# Patient Record
Sex: MALE | Race: WHITE | NOT HISPANIC OR LATINO | Employment: FULL TIME | URBAN - METROPOLITAN AREA
[De-identification: names, ages, dates, MRNs, and addresses within clinical notes are randomized per-mention and may not be internally consistent; named-entity substitution may affect disease eponyms.]

---

## 2018-03-14 ENCOUNTER — OFFICE VISIT (OUTPATIENT)
Dept: ORTHOPEDIC SURGERY | Facility: CLINIC | Age: 68
End: 2018-03-14

## 2018-03-14 DIAGNOSIS — M17.0 PRIMARY OSTEOARTHRITIS OF KNEES, BILATERAL: Primary | ICD-10-CM

## 2018-03-14 PROCEDURE — 20610 DRAIN/INJ JOINT/BURSA W/O US: CPT | Performed by: NURSE PRACTITIONER

## 2018-03-14 PROCEDURE — 73562 X-RAY EXAM OF KNEE 3: CPT | Performed by: NURSE PRACTITIONER

## 2018-03-14 PROCEDURE — 99212 OFFICE O/P EST SF 10 MIN: CPT | Performed by: NURSE PRACTITIONER

## 2018-03-14 RX ORDER — LORATADINE 10 MG/1
10 CAPSULE, LIQUID FILLED ORAL
COMMUNITY
End: 2019-06-18

## 2018-03-14 RX ORDER — VALSARTAN AND HYDROCHLOROTHIAZIDE 320; 25 MG/1; MG/1
1 TABLET, FILM COATED ORAL DAILY
COMMUNITY
Start: 2018-02-19 | End: 2021-06-28 | Stop reason: HOSPADM

## 2018-03-14 RX ORDER — KRILL/OM-3/DHA/EPA/PHOSPHO/AST 500MG-86MG
500 CAPSULE ORAL
COMMUNITY
End: 2019-06-18

## 2018-03-14 RX ORDER — DOXAZOSIN MESYLATE 4 MG/1
TABLET ORAL 2 TIMES DAILY
Status: ON HOLD | COMMUNITY
Start: 2018-03-07 | End: 2018-03-16

## 2018-03-14 RX ORDER — MAGNESIUM OXIDE/MAG AA CHELATE 300 MG
160 CAPSULE ORAL
COMMUNITY
End: 2019-06-18

## 2018-03-14 RX ORDER — PHENOL 1.4 %
600 AEROSOL, SPRAY (ML) MUCOUS MEMBRANE DAILY
COMMUNITY
End: 2019-06-18

## 2018-03-14 RX ADMIN — TRIAMCINOLONE ACETONIDE 80 MG: 40 INJECTION, SUSPENSION INTRA-ARTICULAR; INTRAMUSCULAR at 15:27

## 2018-03-14 RX ADMIN — LIDOCAINE HYDROCHLORIDE 8 ML: 10 INJECTION, SOLUTION EPIDURAL; INFILTRATION; INTRACAUDAL; PERINEURAL at 15:27

## 2018-03-14 NOTE — PROGRESS NOTES
Subjective:     Patient ID: Jefry Sabillon is a 67 y.o. male.    Chief Complaint: primary osteoarthritis bilateral knees    History of Present Illness    Mr. Sabillon presents with his spouse for follow-up bilateral knee pain. Reports pain present over the last several months however wife states that pain has been present for the last year. He was seen by this APRN last in August 2016 for last viscosupplement injections. Reports over the last several months pain at bilateral knees has returned. Pain present over bilateral medial and lateral joint lines and patella. Increased pain with ascending and descending steps, changing positions such as from sitting to standing and attempting to walk. Denies that he feels as if the knees are unstable at this time. Denies presence of numbness and tingling radiating down bilateral lower extremities. Rates pain at 8 out of 10, aching in nature. Denies that he is yet ready to proceed with total joint replacement surgery but would like to discuss pain relief measures at this time. Denies all other concerns present at this time.      Social History     Occupational History   • Not on file.     Social History Main Topics   • Smoking status: Never Smoker   • Smokeless tobacco: Never Used   • Alcohol use Yes   • Drug use: No   • Sexual activity: Defer      Past Medical History:   Diagnosis Date   • Arthritis    • Asthma    • Cardiac disease    • Coronary artery disease    • Heart disease    • Hypertension    • Knee sprain    • Mini stroke    • EVE (obstructive sleep apnea)    • Rotator cuff syndrome      Past Surgical History:   Procedure Laterality Date   • HERNIA REPAIR  2007   • KNEE SURGERY  1979       Family History   Problem Relation Age of Onset   • Hypertension Father    • Heart disease Father          Review of Systems   Constitutional: Negative for chills, diaphoresis, fever and unexpected weight change.   HENT: Negative for hearing loss, nosebleeds, sore throat and tinnitus.     Eyes: Negative for pain and visual disturbance.   Respiratory: Negative for cough, shortness of breath and wheezing.    Cardiovascular: Negative for chest pain and palpitations.   Gastrointestinal: Negative for abdominal pain, diarrhea, nausea and vomiting.   Endocrine: Negative for cold intolerance, heat intolerance and polydipsia.   Genitourinary: Negative for difficulty urinating, dysuria and hematuria.   Musculoskeletal: Positive for arthralgias and joint swelling. Negative for myalgias.   Skin: Negative for rash and wound.   Allergic/Immunologic: Negative for environmental allergies.   Neurological: Negative for dizziness, syncope and numbness.   Hematological: Does not bruise/bleed easily.   Psychiatric/Behavioral: Negative for dysphoric mood and sleep disturbance. The patient is not nervous/anxious.    All other systems reviewed and are negative.          Objective:  Physical Exam    General: No acute distress.  Eyes: conjunctiva clear; pupils equally round and reactive  ENT: external ears and nose atraumatic; oropharynx clear  CV: no peripheral edema  Resp: normal respiratory effort  Skin: no rashes or wounds; normal turgor  Psych: mood and affect appropriate; recent and remote memory intact    There were no vitals filed for this visit.  There were no vitals filed for this visit.  There is no height or weight on file to calculate BMI.     Right Knee Exam     Tenderness   The patient is experiencing tenderness in the lateral joint line, medial joint line and patella.    Range of Motion   Extension: 0   Flexion: 120     Tests   Keith:  Medial - negative Lateral - negative  Lachman:  Anterior - 1+    Posterior - negative  Drawer:       Anterior - negative    Posterior - negative  Varus: negative  Valgus: negative    Other   Erythema: absent  Scars: absent  Sensation: normal  Pulse: present  Swelling: mild  Other tests: no effusion present    Comments:  Crepitus throughout arc of motion        Left Knee  Exam     Tenderness   The patient is experiencing tenderness in the lateral joint line, medial joint line and patella.    Range of Motion   Extension: 0   Flexion: 120     Tests   Keith:  Medial - negative Lateral - negative  Lachman:  Anterior - 1+    Posterior - negative  Drawer:       Anterior - negative     Posterior - negative  Varus: negative  Valgus: negative    Other   Erythema: absent  Scars: absent  Sensation: normal  Pulse: present  Swelling: mild  Effusion: no effusion present    Comments:  Crepitus throughout arc of motion              Imaging:  Bilateral Knee X-Ray  Indication: Pain    AP, Lateral, and Rodessa views    Findings:  No fracture  No bony lesion  Normal soft tissues  Tricompartmental osteoarthritis bilateral knees  Prior studies were available for comparison.    Assessment:       1. Primary osteoarthritis of knees, bilateral          Plan:  1. Discussed plan of care with patient and his spouse. Wishes to proceed with corticosteroid injections at bilateral knees. Will plan to see him back in one month for follow-up/reassess at that time. Discussed if not improving, will plan to submit for viscosupplement injections at that time for bilateral knees.   2. Encouraged for him to continue with his daily strengthening activities. Patient verbalized understanding of all information and agrees with plan of care. Denies all other concerns present at this time.     Orders:  Orders Placed This Encounter   Procedures   • Large Joint Arthrocentesis   • XR Knee 3 View Bilateral       I ordered and reviewed the IRENE today.     Dictated utilizing Dragon dictation     Large Joint Arthrocentesis  Date/Time: 3/14/2018 3:27 PM  Consent given by: patient  Site marked: site marked  Timeout: Immediately prior to procedure a time out was called to verify the correct patient, procedure, equipment, support staff and site/side marked as required   Supporting Documentation  Indications: pain   Procedure  Details  Location: knee - Knee joint: bilateral.  Preparation: Patient was prepped and draped in the usual sterile fashion  Needle size: 22 G  Approach: anterolateral  Medications administered: 8 mL lidocaine PF 1% 1 %; 80 mg triamcinolone acetonide 40 MG/ML  Patient tolerance: patient tolerated the procedure well with no immediate complications

## 2018-03-15 ENCOUNTER — APPOINTMENT (OUTPATIENT)
Dept: GENERAL RADIOLOGY | Facility: HOSPITAL | Age: 68
End: 2018-03-15

## 2018-03-15 ENCOUNTER — HOSPITAL ENCOUNTER (OUTPATIENT)
Facility: HOSPITAL | Age: 68
Setting detail: OBSERVATION
Discharge: HOME | End: 2018-03-16
Attending: EMERGENCY MEDICINE | Admitting: FAMILY MEDICINE

## 2018-03-15 DIAGNOSIS — R73.9 HYPERGLYCEMIA: ICD-10-CM

## 2018-03-15 DIAGNOSIS — R09.89 PULMONARY VASCULAR CONGESTION: ICD-10-CM

## 2018-03-15 DIAGNOSIS — R06.00 ACUTE DYSPNEA: Primary | ICD-10-CM

## 2018-03-15 DIAGNOSIS — R07.9 INTERMITTENT CHEST PAIN: ICD-10-CM

## 2018-03-15 DIAGNOSIS — R01.1 SYSTOLIC MURMUR: ICD-10-CM

## 2018-03-15 LAB
ALBUMIN SERPL-MCNC: 4.3 G/DL (ref 3.5–5.2)
ALBUMIN/GLOB SERPL: 1.7 G/DL
ALP SERPL-CCNC: 87 U/L (ref 40–129)
ALT SERPL W P-5'-P-CCNC: 61 U/L (ref 5–41)
ANION GAP SERPL CALCULATED.3IONS-SCNC: 14.7 MMOL/L
ANION GAP SERPL CALCULATED.3IONS-SCNC: 16.1 MMOL/L
AST SERPL-CCNC: 27 U/L (ref 5–40)
BASOPHILS # BLD AUTO: 0.01 10*3/MM3 (ref 0–0.2)
BASOPHILS NFR BLD AUTO: 0.1 % (ref 0–2)
BILIRUB SERPL-MCNC: 0.8 MG/DL (ref 0.2–1.2)
BUN BLD-MCNC: 18 MG/DL (ref 8–23)
BUN BLD-MCNC: 20 MG/DL (ref 8–23)
BUN/CREAT SERPL: 17 (ref 7–25)
BUN/CREAT SERPL: 17.4 (ref 7–25)
CALCIUM SPEC-SCNC: 10.2 MG/DL (ref 8.8–10.5)
CALCIUM SPEC-SCNC: 10.5 MG/DL (ref 8.8–10.5)
CHLORIDE SERPL-SCNC: 100 MMOL/L (ref 98–107)
CHLORIDE SERPL-SCNC: 99 MMOL/L (ref 98–107)
CO2 SERPL-SCNC: 21.3 MMOL/L (ref 22–29)
CO2 SERPL-SCNC: 22.9 MMOL/L (ref 22–29)
CREAT BLD-MCNC: 1.06 MG/DL (ref 0.76–1.27)
CREAT BLD-MCNC: 1.15 MG/DL (ref 0.76–1.27)
D DIMER PPP FEU-MCNC: 0.35 MCGFEU/ML (ref 0–0.46)
DEPRECATED RDW RBC AUTO: 47.5 FL (ref 37–54)
DEPRECATED RDW RBC AUTO: 48.3 FL (ref 37–54)
EOSINOPHIL # BLD AUTO: 0 10*3/MM3 (ref 0.1–0.3)
EOSINOPHIL NFR BLD AUTO: 0 % (ref 0–4)
ERYTHROCYTE [DISTWIDTH] IN BLOOD BY AUTOMATED COUNT: 14.9 % (ref 11.5–14.5)
ERYTHROCYTE [DISTWIDTH] IN BLOOD BY AUTOMATED COUNT: 15.2 % (ref 11.5–14.5)
GFR SERPL CREATININE-BSD FRML MDRD: 64 ML/MIN/1.73
GFR SERPL CREATININE-BSD FRML MDRD: 70 ML/MIN/1.73
GLOBULIN UR ELPH-MCNC: 2.5 GM/DL
GLUCOSE BLD-MCNC: 251 MG/DL (ref 65–99)
GLUCOSE BLD-MCNC: 281 MG/DL (ref 65–99)
GLUCOSE BLDC GLUCOMTR-MCNC: 249 MG/DL (ref 70–130)
HCT VFR BLD AUTO: 38.9 % (ref 42–52)
HCT VFR BLD AUTO: 39.3 % (ref 42–52)
HGB BLD-MCNC: 13.6 G/DL (ref 14–18)
HGB BLD-MCNC: 13.6 G/DL (ref 14–18)
IMM GRANULOCYTES # BLD: 0.06 10*3/MM3 (ref 0–0.03)
IMM GRANULOCYTES NFR BLD: 0.4 % (ref 0–0.5)
LYMPHOCYTES # BLD AUTO: 0.88 10*3/MM3 (ref 0.6–4.8)
LYMPHOCYTES NFR BLD AUTO: 5.8 % (ref 20–45)
MCH RBC QN AUTO: 30.3 PG (ref 27–31)
MCH RBC QN AUTO: 30.4 PG (ref 27–31)
MCHC RBC AUTO-ENTMCNC: 34.6 G/DL (ref 31–37)
MCHC RBC AUTO-ENTMCNC: 35 G/DL (ref 31–37)
MCV RBC AUTO: 87 FL (ref 80–94)
MCV RBC AUTO: 87.5 FL (ref 80–94)
MONOCYTES # BLD AUTO: 0.43 10*3/MM3 (ref 0–1)
MONOCYTES NFR BLD AUTO: 2.8 % (ref 3–8)
NEUTROPHILS # BLD AUTO: 13.91 10*3/MM3 (ref 1.5–8.3)
NEUTROPHILS NFR BLD AUTO: 90.9 % (ref 45–70)
NRBC BLD MANUAL-RTO: 0 /100 WBC (ref 0–0)
NT-PROBNP SERPL-MCNC: 259.5 PG/ML (ref 0–900)
PLATELET # BLD AUTO: 214 10*3/MM3 (ref 140–500)
PLATELET # BLD AUTO: 225 10*3/MM3 (ref 140–500)
PMV BLD AUTO: 10.3 FL (ref 7.4–10.4)
PMV BLD AUTO: 10.5 FL (ref 7.4–10.4)
POTASSIUM BLD-SCNC: 4.2 MMOL/L (ref 3.5–5.2)
POTASSIUM BLD-SCNC: 4.4 MMOL/L (ref 3.5–5.2)
PROT SERPL-MCNC: 6.8 G/DL (ref 6–8.5)
RBC # BLD AUTO: 4.47 10*6/MM3 (ref 4.7–6.1)
RBC # BLD AUTO: 4.49 10*6/MM3 (ref 4.7–6.1)
SODIUM BLD-SCNC: 136 MMOL/L (ref 136–145)
SODIUM BLD-SCNC: 138 MMOL/L (ref 136–145)
TROPONIN T SERPL-MCNC: <0.01 NG/ML (ref 0–0.03)
WBC NRBC COR # BLD: 15.29 10*3/MM3 (ref 4.8–10.8)
WBC NRBC COR # BLD: 15.6 10*3/MM3 (ref 4.8–10.8)

## 2018-03-15 PROCEDURE — 83880 ASSAY OF NATRIURETIC PEPTIDE: CPT | Performed by: EMERGENCY MEDICINE

## 2018-03-15 PROCEDURE — G0378 HOSPITAL OBSERVATION PER HR: HCPCS

## 2018-03-15 PROCEDURE — 93010 ELECTROCARDIOGRAM REPORT: CPT | Performed by: INTERNAL MEDICINE

## 2018-03-15 PROCEDURE — 25010000002 ENOXAPARIN PER 10 MG: Performed by: FAMILY MEDICINE

## 2018-03-15 PROCEDURE — 93005 ELECTROCARDIOGRAM TRACING: CPT | Performed by: EMERGENCY MEDICINE

## 2018-03-15 PROCEDURE — 71046 X-RAY EXAM CHEST 2 VIEWS: CPT

## 2018-03-15 PROCEDURE — 85025 COMPLETE CBC W/AUTO DIFF WBC: CPT | Performed by: EMERGENCY MEDICINE

## 2018-03-15 PROCEDURE — 63710000001 INSULIN ASPART PER 5 UNITS: Performed by: FAMILY MEDICINE

## 2018-03-15 PROCEDURE — 80048 BASIC METABOLIC PNL TOTAL CA: CPT | Performed by: FAMILY MEDICINE

## 2018-03-15 PROCEDURE — 82962 GLUCOSE BLOOD TEST: CPT

## 2018-03-15 PROCEDURE — 80053 COMPREHEN METABOLIC PANEL: CPT | Performed by: EMERGENCY MEDICINE

## 2018-03-15 PROCEDURE — 85379 FIBRIN DEGRADATION QUANT: CPT | Performed by: EMERGENCY MEDICINE

## 2018-03-15 PROCEDURE — 85027 COMPLETE CBC AUTOMATED: CPT | Performed by: FAMILY MEDICINE

## 2018-03-15 PROCEDURE — 99284 EMERGENCY DEPT VISIT MOD MDM: CPT | Performed by: EMERGENCY MEDICINE

## 2018-03-15 PROCEDURE — 96372 THER/PROPH/DIAG INJ SC/IM: CPT

## 2018-03-15 PROCEDURE — 99283 EMERGENCY DEPT VISIT LOW MDM: CPT

## 2018-03-15 PROCEDURE — 84484 ASSAY OF TROPONIN QUANT: CPT | Performed by: EMERGENCY MEDICINE

## 2018-03-15 RX ORDER — ASPIRIN 81 MG/1
81 TABLET, CHEWABLE ORAL DAILY
Status: DISCONTINUED | OUTPATIENT
Start: 2018-03-15 | End: 2018-03-16 | Stop reason: HOSPADM

## 2018-03-15 RX ORDER — DOXAZOSIN 2 MG/1
4 TABLET ORAL 2 TIMES DAILY
Status: DISCONTINUED | OUTPATIENT
Start: 2018-03-15 | End: 2018-03-16

## 2018-03-15 RX ORDER — NICOTINE POLACRILEX 4 MG
15 LOZENGE BUCCAL
Status: DISCONTINUED | OUTPATIENT
Start: 2018-03-15 | End: 2018-03-16 | Stop reason: HOSPADM

## 2018-03-15 RX ORDER — BISACODYL 5 MG/1
5 TABLET, DELAYED RELEASE ORAL DAILY PRN
Status: DISCONTINUED | OUTPATIENT
Start: 2018-03-15 | End: 2018-03-16 | Stop reason: HOSPADM

## 2018-03-15 RX ORDER — SODIUM CHLORIDE 0.9 % (FLUSH) 0.9 %
10 SYRINGE (ML) INJECTION AS NEEDED
Status: DISCONTINUED | OUTPATIENT
Start: 2018-03-15 | End: 2018-03-16 | Stop reason: HOSPADM

## 2018-03-15 RX ORDER — ACETAMINOPHEN 325 MG/1
650 TABLET ORAL EVERY 4 HOURS PRN
Status: DISCONTINUED | OUTPATIENT
Start: 2018-03-15 | End: 2018-03-16 | Stop reason: HOSPADM

## 2018-03-15 RX ORDER — SODIUM CHLORIDE 0.9 % (FLUSH) 0.9 %
1-10 SYRINGE (ML) INJECTION AS NEEDED
Status: DISCONTINUED | OUTPATIENT
Start: 2018-03-15 | End: 2018-03-16 | Stop reason: HOSPADM

## 2018-03-15 RX ORDER — DEXTROSE MONOHYDRATE 25 G/50ML
25 INJECTION, SOLUTION INTRAVENOUS
Status: DISCONTINUED | OUTPATIENT
Start: 2018-03-15 | End: 2018-03-16 | Stop reason: HOSPADM

## 2018-03-15 RX ORDER — FUROSEMIDE 20 MG/1
20 TABLET ORAL DAILY
Status: DISCONTINUED | OUTPATIENT
Start: 2018-03-15 | End: 2018-03-16

## 2018-03-15 RX ORDER — SODIUM CHLORIDE 9 MG/ML
40 INJECTION, SOLUTION INTRAVENOUS AS NEEDED
Status: DISCONTINUED | OUTPATIENT
Start: 2018-03-15 | End: 2018-03-16 | Stop reason: HOSPADM

## 2018-03-15 RX ORDER — PANTOPRAZOLE SODIUM 40 MG/1
40 TABLET, DELAYED RELEASE ORAL
Status: DISCONTINUED | OUTPATIENT
Start: 2018-03-16 | End: 2018-03-16 | Stop reason: HOSPADM

## 2018-03-15 RX ORDER — METFORMIN HYDROCHLORIDE 750 MG/1
750 TABLET, EXTENDED RELEASE ORAL
Status: DISCONTINUED | OUTPATIENT
Start: 2018-03-16 | End: 2018-03-16

## 2018-03-15 RX ORDER — ALLOPURINOL 300 MG/1
300 TABLET ORAL DAILY
Status: DISCONTINUED | OUTPATIENT
Start: 2018-03-15 | End: 2018-03-16 | Stop reason: HOSPADM

## 2018-03-15 RX ADMIN — FUROSEMIDE 20 MG: 20 TABLET ORAL at 20:22

## 2018-03-15 RX ADMIN — INSULIN ASPART 4 UNITS: 100 INJECTION, SOLUTION INTRAVENOUS; SUBCUTANEOUS at 21:25

## 2018-03-15 RX ADMIN — DOXAZOSIN 4 MG: 2 TABLET ORAL at 21:26

## 2018-03-15 RX ADMIN — ENOXAPARIN SODIUM 40 MG: 40 INJECTION SUBCUTANEOUS at 20:22

## 2018-03-15 NOTE — ED NOTES
Called Dr. Simpson office.  stated she would let Dr. Simpson know we called and he would call back.     Jean Pierre Will  03/15/18 6984

## 2018-03-15 NOTE — ED PROVIDER NOTES
Subjective   History of Present Illness  History of Present Illness    Chief complaint: Dyspnea and chest pressure    Location: Between his scapula posteriorly    Quality/Severity:  Moderate    Timing/Duration: Dyspnea onset 0700 and has been constant though the intensity waxes and wanes since onset; chest pain intermittent    Modifying Factors: Worse with exertion    Associated Symptoms: Patient was sick about 4 weeks ago but has been well for at least 2 weeks.  No recent cough, fever, hemoptysis.  No abdominal pain, nausea, vomiting.  No black or bloody stools reported.    Narrative: 67-year-old male who did not sleep well last night complains of dyspnea and some atypical chest discomfort that is primarily noted between his scapula in the back.  The patient took full dose, 325 aspirin orally prior to arrival.    PCP: Calvin  Cardiology: None    Review of Systems  All other systems reviewed otherwise negative as related chief  Past Medical History:   Diagnosis Date   • Arthritis    • Asthma    • Cardiac disease    • Heart disease    • Hypertension    • Knee sprain    • Mini stroke    • EVE (obstructive sleep apnea)    • Rotator cuff syndrome        Allergies   Allergen Reactions   • Penicillins Dermatitis       Past Surgical History:   Procedure Laterality Date   • HERNIA REPAIR  2007   • KNEE SURGERY  1979       Family History   Problem Relation Age of Onset   • Hypertension Father    • Heart disease Father        Social History     Social History   • Marital status:      Social History Main Topics   • Smoking status: Never Smoker   • Smokeless tobacco: Never Used   • Alcohol use Yes   • Drug use: No   • Sexual activity: Defer     Other Topics Concern   • Not on file       ED Triage Vitals [03/15/18 1356]   Temp Heart Rate Resp BP SpO2   98.7 °F (37.1 °C) 100 20 140/68 94 %      Temp src Heart Rate Source Patient Position BP Location FiO2 (%)   Oral Monitor Lying Right arm --         Objective    Physical Exam   Constitutional: He is oriented to person, place, and time. He appears well-developed.   anxious   HENT:   Head: Normocephalic.   Eyes: Conjunctivae are normal.   Neck: Neck supple. No tracheal deviation present.   Cardiovascular: Regular rhythm and intact distal pulses.    Murmur (4/6 JOSE) heard.  tachycardic   Pulmonary/Chest: Effort normal and breath sounds normal. No respiratory distress.   Abdominal: Soft.   Large habitus, non-tender   Musculoskeletal: Edema: 1+ pitting edema chanelle LE.   Neurological: He is alert and oriented to person, place, and time.   Skin: Skin is warm and dry.   Psychiatric: He has a normal mood and affect.       Procedures         ED Course  ED Course   Value Comment By Time    ED evaluation unrevealing for acute life threat.  Labs and x-ray result reviewed with patient.  Agreeable with consultation with Dr. Simpson. Jose Ramon Aparicio MD 03/15 1533   WBC: (!) 15.29 Received cortisone shot for osteoarthritis of knee yesterday. Jose Ramon Aparicio MD 03/15 1533   Glucose: (!) 281 Known diabetic, received cortisone shot for osteoarthritis of knees yesterday which likely contribute to hyperglycemia today. Jose Ramon Aparicio MD 03/15 1533    CONSULT        Provider: Dr. Simpson - PCP    Discussion: The patient history, ED presentation and evaluation.  Accepts patient has mild with telemetry.  He did request that I attempt to contact cardiopulmonary to see if stat echo was available.  I call the department and did not get an answer.  I left a message.    Agreeable c treatment and planned disposition.   Jose Ramon Aparicio MD 03/15 1622        EKG           EKG time / Interpretation time: 1400 / 1403  Rhythm/Rate: Sinus tachycardia, 105  P waves and KY: ERIBERTO within normal limits  QRS, axis: Right bundle-branch block   ST and T waves: No STEMI; QTc within normal limits     Interpreted Contemporaneously by me, independently viewed  Comparison unavailable     Results for orders placed or  performed during the hospital encounter of 03/15/18   Comprehensive Metabolic Panel   Result Value Ref Range    Glucose 281 (H) 65 - 99 mg/dL    BUN 18 8 - 23 mg/dL    Creatinine 1.06 0.76 - 1.27 mg/dL    Sodium 136 136 - 145 mmol/L    Potassium 4.2 3.5 - 5.2 mmol/L    Chloride 100 98 - 107 mmol/L    CO2 21.3 (L) 22.0 - 29.0 mmol/L    Calcium 10.2 8.8 - 10.5 mg/dL    Total Protein 6.8 6.0 - 8.5 g/dL    Albumin 4.30 3.50 - 5.20 g/dL    ALT (SGPT) 61 (H) 5 - 41 U/L    AST (SGOT) 27 5 - 40 U/L    Alkaline Phosphatase 87 40 - 129 U/L    Total Bilirubin 0.8 0.2 - 1.2 mg/dL    eGFR Non African Amer 70 >60 mL/min/1.73    Globulin 2.5 gm/dL    A/G Ratio 1.7 g/dL    BUN/Creatinine Ratio 17.0 7.0 - 25.0    Anion Gap 14.7 mmol/L   Troponin   Result Value Ref Range    Troponin T <0.010 0.000 - 0.030 ng/mL   CBC Auto Differential   Result Value Ref Range    WBC 15.29 (H) 4.80 - 10.80 10*3/mm3    RBC 4.49 (L) 4.70 - 6.10 10*6/mm3    Hemoglobin 13.6 (L) 14.0 - 18.0 g/dL    Hematocrit 39.3 (L) 42.0 - 52.0 %    MCV 87.5 80.0 - 94.0 fL    MCH 30.3 27.0 - 31.0 pg    MCHC 34.6 31.0 - 37.0 g/dL    RDW 14.9 (H) 11.5 - 14.5 %    RDW-SD 47.5 37.0 - 54.0 fl    MPV 10.5 (H) 7.4 - 10.4 fL    Platelets 214 140 - 500 10*3/mm3    Neutrophil % 90.9 (H) 45.0 - 70.0 %    Lymphocyte % 5.8 (L) 20.0 - 45.0 %    Monocyte % 2.8 (L) 3.0 - 8.0 %    Eosinophil % 0.0 0.0 - 4.0 %    Basophil % 0.1 0.0 - 2.0 %    Immature Grans % 0.4 0.0 - 0.5 %    Neutrophils, Absolute 13.91 (H) 1.50 - 8.30 10*3/mm3    Lymphocytes, Absolute 0.88 0.60 - 4.80 10*3/mm3    Monocytes, Absolute 0.43 0.00 - 1.00 10*3/mm3    Eosinophils, Absolute 0.00 (L) 0.10 - 0.30 10*3/mm3    Basophils, Absolute 0.01 0.00 - 0.20 10*3/mm3    Immature Grans, Absolute 0.06 (H) 0.00 - 0.03 10*3/mm3    nRBC 0.0 0.0 - 0.0 /100 WBC   D-dimer, Quantitative   Result Value Ref Range    D-Dimer, Quantitative 0.35 0.00 - 0.46 MCGFEU/mL     RADIOLOGY        Study: Chest x-ray-PA and lateral    Findings: No  acute findings    Interpreted contemporaneously with treatment by me, independently viewed by me    Xr Chest 2 View    Result Date: 3/15/2018  Narrative: INDICATION:  Shortness of air today  COMPARISON:  None.  FINDINGS: PA and lateral views of the chest.The heart and mediastinum are normal in size and configuration. There is mild prominence of the central pulmonary vessels with mild bilateral perihilar fullness. No pneumothorax or pleural effusions.      Impression: Mild central vascular prominence and perihilar fullness suspicious for mild CHF. FINDINGS could also be seen in the setting of central pneumonitis  This report was finalized on 3/15/2018 3:38 PM by Dr. Trery Metcalf MD.                    MDM  Number of Diagnoses or Management Options  Acute dyspnea:   Hyperglycemia:   Intermittent chest pain:   Pulmonary vascular congestion:   Systolic murmur:      Amount and/or Complexity of Data Reviewed  Clinical lab tests: reviewed and ordered  Tests in the radiology section of CPT®: ordered and reviewed  Tests in the medicine section of CPT®: reviewed and ordered  Decide to obtain previous medical records or to obtain history from someone other than the patient: yes  Obtain history from someone other than the patient: yes  Discuss the patient with other providers: yes        Final diagnoses:   Acute dyspnea   Intermittent chest pain   Hyperglycemia   Systolic murmur   Pulmonary vascular congestion            Jose Ramon Aparicio MD  03/15/18 4015

## 2018-03-15 NOTE — H&P
HISTORY AND PHYSICAL      Patient Care Team:  Kash Simpson MD as PCP - General (Family Medicine)    CHIEF COMPLAINT: Shortness of breath and heart pounding    HISTORY OF PRESENT ILLNESS:    67-year-old white male with history of hypertension hyperlipidemia and impaired fasting glucose with no prior cardiac history who presented to the emergency room with chest discomfort and shortness of breath.  Patient states return for marked by 24 hours ago just not feeling well felt.  Tired and felt his heart was pounding.  He took his blood pressure several times during the day and was initially elevated heart rate was in the 110.  Patient thought he was just tired and went to bed and got up and so was having problems with his heart pounding.  He was somewhat little short of breath discomfort progressively worse and therefore he came to emergency room.  On his way to the emergency room he started having some chest discomfort and chest pressure mostly located in the back and between his shoulder blades and had some left arm pain and numbness in his left arm became cold below the elbow.  Had has no prior cardiac history except set multiple years ago apparently had a chest contusion from a third of the chest and had a cardiac catheter that time which was negative    Past medical history    Hypertension  Hyperlipidemia  Impaired fasting glucose  Morbid obesity  Osteoarthritis    Past Surgical History:   Procedure Laterality Date   • HERNIA REPAIR  2007   • KNEE SURGERY  1979     Family History   Problem Relation Age of Onset   • Hypertension Father    • Heart disease Father      Social History   Substance Use Topics   • Smoking status: Never Smoker   • Smokeless tobacco: Never Used   • Alcohol use Yes       (Not in a hospital admission)  Allergies:  Penicillins     Review of Systems   Constitutional: Positive for activity change. Negative for appetite change and fatigue.   Respiratory: Positive for chest tightness  "and shortness of breath. Negative for cough and wheezing.    Cardiovascular: Positive for palpitations. Negative for chest pain.   Gastrointestinal: Negative for abdominal distention, abdominal pain, diarrhea, nausea and vomiting.   Genitourinary: Negative for frequency.   Skin: Negative for rash.   Neurological: Negative for dizziness, speech difficulty and light-headedness.   Psychiatric/Behavioral: Negative for agitation, behavioral problems and sleep disturbance. The patient is not nervous/anxious.        Vital Signs  Temp:  [98.7 °F (37.1 °C)] 98.7 °F (37.1 °C)  Heart Rate:  [100] 100  Resp:  [20] 20  BP: (140)/(68) 140/68    Flowsheet Rows    Flowsheet Row First Filed Value   Admission Height 182.9 cm (72\") Documented at 03/15/2018 1356   Admission Weight 129 kg (285 lb) Documented at 03/15/2018 1356             Physical Exam   Constitutional: He appears well-developed and well-nourished.   HENT:   Head: Normocephalic.   Mouth/Throat: Oropharynx is clear and moist.   Eyes: Conjunctivae are normal.   Neck: Normal range of motion. No JVD present. No thyromegaly present.   Cardiovascular: Normal rate and regular rhythm.    Murmur (Right upper sternal border) heard.   Systolic murmur is present with a grade of 3/6   Pulmonary/Chest: Effort normal and breath sounds normal. No respiratory distress. He has no wheezes. He has no rales.   Abdominal: Soft. Bowel sounds are normal. He exhibits no distension. There is no tenderness. There is no guarding.   Neurological: He is alert.   Skin: Skin is warm and dry. No rash noted.   Psychiatric:   Anxious   Nursing note and vitals reviewed.     Results Review:    I reviewed the patient's new clinical results.  Lab Results (most recent)     Procedure Component Value Units Date/Time    BNP [[id number]]  (Normal) Collected:  03/15/18 1420    Specimen:  Blood Updated:  03/15/18 1633     proBNP 259.5 pg/mL     Narrative:       Among patients with dyspnea, NT-proBNP is highly " sensitive for the detection of acute congestive heart failure. In addition NT-proBNP of <300 pg/ml effectively rules out acute congestive heart failure with 99% negative predictive value.    Troponin [[id number]]  (Normal) Collected:  03/15/18 1420    Specimen:  Blood Updated:  03/15/18 1445     Troponin T <0.010 ng/mL     Narrative:       Troponin T Reference Ranges:  Less than 0.03 ng/mL:    Negative for AMI  0.03 to 0.09 ng/mL:      Indeterminant for AMI  Greater than 0.09 ng/mL: Positive for AMI    Comprehensive Metabolic Panel [[id number]]  (Abnormal) Collected:  03/15/18 1420    Specimen:  Blood Updated:  03/15/18 1443     Glucose 281 (H) mg/dL      BUN 18 mg/dL      Creatinine 1.06 mg/dL      Sodium 136 mmol/L      Potassium 4.2 mmol/L      Chloride 100 mmol/L      CO2 21.3 (L) mmol/L      Calcium 10.2 mg/dL      Total Protein 6.8 g/dL      Albumin 4.30 g/dL      ALT (SGPT) 61 (H) U/L      AST (SGOT) 27 U/L      Alkaline Phosphatase 87 U/L      Total Bilirubin 0.8 mg/dL      eGFR Non African Amer 70 mL/min/1.73      Globulin 2.5 gm/dL      A/G Ratio 1.7 g/dL      BUN/Creatinine Ratio 17.0     Anion Gap 14.7 mmol/L     D-dimer, Quantitative [[id number]]  (Normal) Collected:  03/15/18 1420    Specimen:  Blood Updated:  03/15/18 1440     D-Dimer, Quantitative 0.35 MCGFEU/mL     Narrative:       Can be elevated in, but is not diagnostic for deep vein thrombosis (DVT) or pulmonary embolis (PE).  It is also elevated in other medical conditions.  Clinical correlation is required.  The negative cut-off value for the D-Dimer is 0.50 mcg FEU/mL for DVT and PE.    CBC & Differential [[id number]] Collected:  03/15/18 1420    Specimen:  Blood Updated:  03/15/18 1426    Narrative:       The following orders were created for panel order CBC & Differential.  Procedure                               Abnormality         Status                     ---------                               -----------         ------                      CBC Auto Differential[[id number]]        Abnormal            Final result                 Please view results for these tests on the individual orders.    CBC Auto Differential [[id number]]  (Abnormal) Collected:  03/15/18 1420    Specimen:  Blood Updated:  03/15/18 1426     WBC 15.29 (H) 10*3/mm3      RBC 4.49 (L) 10*6/mm3      Hemoglobin 13.6 (L) g/dL      Hematocrit 39.3 (L) %      MCV 87.5 fL      MCH 30.3 pg      MCHC 34.6 g/dL      RDW 14.9 (H) %      RDW-SD 47.5 fl      MPV 10.5 (H) fL      Platelets 214 10*3/mm3      Neutrophil % 90.9 (H) %      Lymphocyte % 5.8 (L) %      Monocyte % 2.8 (L) %      Eosinophil % 0.0 %      Basophil % 0.1 %      Immature Grans % 0.4 %      Neutrophils, Absolute 13.91 (H) 10*3/mm3      Lymphocytes, Absolute 0.88 10*3/mm3      Monocytes, Absolute 0.43 10*3/mm3      Eosinophils, Absolute 0.00 (L) 10*3/mm3      Basophils, Absolute 0.01 10*3/mm3      Immature Grans, Absolute 0.06 (H) 10*3/mm3      nRBC 0.0 /100 WBC           Imaging Results (most recent)     Procedure Component Value Units Date/Time    XR Chest 2 View [[id number]] Collected:  03/15/18 1537     Updated:  03/15/18 1540    Narrative:       INDICATION:  Shortness of air today     COMPARISON:  None.     FINDINGS: PA and lateral views of the chest.The heart and mediastinum  are normal in size and configuration. There is mild prominence of the  central pulmonary vessels with mild bilateral perihilar fullness. No  pneumothorax or pleural effusions.       Impression:       Mild central vascular prominence and perihilar fullness  suspicious for mild CHF. FINDINGS could also be seen in the setting of  central pneumonitis     This report was finalized on 3/15/2018 3:38 PM by Dr. Terry Metcalf MD.           reviewed    ECG/EMG Results (most recent)     Procedure Component Value Units Date/Time    ECG 12 Lead [[id number]] Collected:  03/15/18 1400     Updated:  03/15/18 1418        reviewed    Assessment/Plan     1.  Chest pain  with shortness of breath and palpitations in a patient with multiple risk factors.  Acute coronary syndrome ruled out and stress Cardiolite was ordered in the morning    2.  New onset systolic murmur echocardiogram will be ordered further evaluated depending on results    3.  Impaired fasting glucose with hyperglycemia patient did receive intra-articular steroids couple days ago which could explain his leukocytosis as well as elevated blood sugar will continue monitor will check an A1c    4.  Hypertension at this time patient is well controlled continue medications    5.  Hyperlipidemia poorly controlled because of his intolerance to statins will continue to monitor      I discussed the patients findings and my recommendations with patient and His wife    Kash Simpson MD  03/15/18  4:59 PM

## 2018-03-16 ENCOUNTER — APPOINTMENT (OUTPATIENT)
Dept: NUCLEAR MEDICINE | Facility: HOSPITAL | Age: 68
End: 2018-03-16

## 2018-03-16 ENCOUNTER — APPOINTMENT (OUTPATIENT)
Dept: CT IMAGING | Facility: HOSPITAL | Age: 68
End: 2018-03-16

## 2018-03-16 ENCOUNTER — APPOINTMENT (OUTPATIENT)
Dept: CARDIOLOGY | Facility: HOSPITAL | Age: 68
End: 2018-03-16
Attending: INTERNAL MEDICINE

## 2018-03-16 ENCOUNTER — APPOINTMENT (OUTPATIENT)
Dept: CARDIOLOGY | Facility: HOSPITAL | Age: 68
End: 2018-03-16

## 2018-03-16 ENCOUNTER — APPOINTMENT (OUTPATIENT)
Dept: CARDIOLOGY | Facility: HOSPITAL | Age: 68
End: 2018-03-16
Attending: FAMILY MEDICINE

## 2018-03-16 VITALS
WEIGHT: 287 LBS | HEIGHT: 72 IN | BODY MASS INDEX: 38.87 KG/M2 | HEART RATE: 77 BPM | SYSTOLIC BLOOD PRESSURE: 131 MMHG | OXYGEN SATURATION: 96 % | TEMPERATURE: 97.3 F | RESPIRATION RATE: 20 BRPM | DIASTOLIC BLOOD PRESSURE: 75 MMHG

## 2018-03-16 LAB
ANION GAP SERPL CALCULATED.3IONS-SCNC: 12.8 MMOL/L
AORTIC ARCH: 3 CM
ASCENDING AORTA: 3.6 CM
BH CV ECHO MEAS - ACS: 2.1 CM
BH CV ECHO MEAS - AO MAX PG (FULL): 4.2 MMHG
BH CV ECHO MEAS - AO MAX PG: 12.8 MMHG
BH CV ECHO MEAS - AO MEAN PG (FULL): 2 MMHG
BH CV ECHO MEAS - AO MEAN PG: 7 MMHG
BH CV ECHO MEAS - AO ROOT AREA (BSA CORRECTED): 1.4
BH CV ECHO MEAS - AO ROOT AREA: 10.2 CM^2
BH CV ECHO MEAS - AO ROOT DIAM: 3.6 CM
BH CV ECHO MEAS - AO V2 MAX: 179 CM/SEC
BH CV ECHO MEAS - AO V2 MEAN: 123 CM/SEC
BH CV ECHO MEAS - AO V2 VTI: 38.9 CM
BH CV ECHO MEAS - ASC AORTA: 3.6 CM
BH CV ECHO MEAS - AVA(I,A): 2.7 CM^2
BH CV ECHO MEAS - AVA(I,D): 2.7 CM^2
BH CV ECHO MEAS - AVA(V,A): 2.8 CM^2
BH CV ECHO MEAS - AVA(V,D): 2.8 CM^2
BH CV ECHO MEAS - BSA(HAYCOCK): 2.6 M^2
BH CV ECHO MEAS - BSA: 2.5 M^2
BH CV ECHO MEAS - BZI_BMI: 38.9 KILOGRAMS/M^2
BH CV ECHO MEAS - BZI_METRIC_HEIGHT: 182.9 CM
BH CV ECHO MEAS - BZI_METRIC_WEIGHT: 130.2 KG
BH CV ECHO MEAS - CONTRAST EF (2CH): 64.8 ML/M^2
BH CV ECHO MEAS - CONTRAST EF 4CH: 72.2 ML/M^2
BH CV ECHO MEAS - EDV(CUBED): 119.8 ML
BH CV ECHO MEAS - EDV(MOD-SP2): 108 ML
BH CV ECHO MEAS - EDV(MOD-SP4): 121 ML
BH CV ECHO MEAS - EDV(TEICH): 114.4 ML
BH CV ECHO MEAS - EF(CUBED): 74.9 %
BH CV ECHO MEAS - EF(MOD-SP2): 64.8 %
BH CV ECHO MEAS - EF(MOD-SP4): 72.2 %
BH CV ECHO MEAS - EF(TEICH): 66.6 %
BH CV ECHO MEAS - ESV(CUBED): 30.1 ML
BH CV ECHO MEAS - ESV(MOD-SP2): 38 ML
BH CV ECHO MEAS - ESV(MOD-SP4): 33.6 ML
BH CV ECHO MEAS - ESV(TEICH): 38.2 ML
BH CV ECHO MEAS - FS: 36.9 %
BH CV ECHO MEAS - IVS/LVPW: 0.96
BH CV ECHO MEAS - IVSD: 1.2 CM
BH CV ECHO MEAS - LAT PEAK E' VEL: 5 CM/SEC
BH CV ECHO MEAS - LV DIASTOLIC VOL/BSA (35-75): 48.7 ML/M^2
BH CV ECHO MEAS - LV MASS(C)D: 232.6 GRAMS
BH CV ECHO MEAS - LV MASS(C)DI: 93.7 GRAMS/M^2
BH CV ECHO MEAS - LV MAX PG: 8.6 MMHG
BH CV ECHO MEAS - LV MEAN PG: 5 MMHG
BH CV ECHO MEAS - LV SYSTOLIC VOL/BSA (12-30): 13.5 ML/M^2
BH CV ECHO MEAS - LV V1 MAX: 147 CM/SEC
BH CV ECHO MEAS - LV V1 MEAN: 105 CM/SEC
BH CV ECHO MEAS - LV V1 VTI: 30.7 CM
BH CV ECHO MEAS - LVIDD: 4.9 CM
BH CV ECHO MEAS - LVIDS: 3.1 CM
BH CV ECHO MEAS - LVLD AP2: 7.6 CM
BH CV ECHO MEAS - LVLD AP4: 7.9 CM
BH CV ECHO MEAS - LVLS AP2: 6 CM
BH CV ECHO MEAS - LVLS AP4: 5.8 CM
BH CV ECHO MEAS - LVOT AREA (M): 3.5 CM^2
BH CV ECHO MEAS - LVOT AREA: 3.5 CM^2
BH CV ECHO MEAS - LVOT DIAM: 2.1 CM
BH CV ECHO MEAS - LVPWD: 1.2 CM
BH CV ECHO MEAS - MED PEAK E' VEL: 6 CM/SEC
BH CV ECHO MEAS - MV A DUR: 0.09 SEC
BH CV ECHO MEAS - MV A MAX VEL: 114 CM/SEC
BH CV ECHO MEAS - MV DEC SLOPE: 504 CM/SEC^2
BH CV ECHO MEAS - MV DEC TIME: 0.21 SEC
BH CV ECHO MEAS - MV E MAX VEL: 119 CM/SEC
BH CV ECHO MEAS - MV E/A: 1
BH CV ECHO MEAS - MV MAX PG: 5.7 MMHG
BH CV ECHO MEAS - MV MEAN PG: 3 MMHG
BH CV ECHO MEAS - MV P1/2T MAX VEL: 114 CM/SEC
BH CV ECHO MEAS - MV P1/2T: 66.2 MSEC
BH CV ECHO MEAS - MV V2 MAX: 119 CM/SEC
BH CV ECHO MEAS - MV V2 MEAN: 79.1 CM/SEC
BH CV ECHO MEAS - MV V2 VTI: 35.7 CM
BH CV ECHO MEAS - MVA P1/2T LCG: 1.9 CM^2
BH CV ECHO MEAS - MVA(P1/2T): 3.3 CM^2
BH CV ECHO MEAS - MVA(VTI): 3 CM^2
BH CV ECHO MEAS - PA ACC TIME: 0.08 SEC
BH CV ECHO MEAS - PA MAX PG (FULL): 3.5 MMHG
BH CV ECHO MEAS - PA MAX PG: 6.3 MMHG
BH CV ECHO MEAS - PA PR(ACCEL): 44.4 MMHG
BH CV ECHO MEAS - PA V2 MAX: 125 CM/SEC
BH CV ECHO MEAS - PULM A REVS DUR: 0.09 SEC
BH CV ECHO MEAS - PULM A REVS VEL: 24.4 CM/SEC
BH CV ECHO MEAS - PULM DIAS VEL: 80.3 CM/SEC
BH CV ECHO MEAS - PULM S/D: 0.59
BH CV ECHO MEAS - PULM SYS VEL: 47.4 CM/SEC
BH CV ECHO MEAS - PVA(V,A): 2.3 CM^2
BH CV ECHO MEAS - PVA(V,D): 2.3 CM^2
BH CV ECHO MEAS - QP/QS: 0.42
BH CV ECHO MEAS - RAP SYSTOLE: 3 MMHG
BH CV ECHO MEAS - RV MAX PG: 2.8 MMHG
BH CV ECHO MEAS - RV MEAN PG: 2 MMHG
BH CV ECHO MEAS - RV V1 MAX: 83.3 CM/SEC
BH CV ECHO MEAS - RV V1 MEAN: 58.9 CM/SEC
BH CV ECHO MEAS - RV V1 VTI: 12.8 CM
BH CV ECHO MEAS - RVOT AREA: 3.5 CM^2
BH CV ECHO MEAS - RVOT DIAM: 2.1 CM
BH CV ECHO MEAS - RVSP: 17 MMHG
BH CV ECHO MEAS - SI(AO): 159.4 ML/M^2
BH CV ECHO MEAS - SI(CUBED): 36.1 ML/M^2
BH CV ECHO MEAS - SI(LVOT): 42.8 ML/M^2
BH CV ECHO MEAS - SI(MOD-SP2): 28.2 ML/M^2
BH CV ECHO MEAS - SI(MOD-SP4): 35.2 ML/M^2
BH CV ECHO MEAS - SI(TEICH): 30.7 ML/M^2
BH CV ECHO MEAS - SUP REN AO DIAM: 2.5 CM
BH CV ECHO MEAS - SV(AO): 396 ML
BH CV ECHO MEAS - SV(CUBED): 89.7 ML
BH CV ECHO MEAS - SV(LVOT): 106.3 ML
BH CV ECHO MEAS - SV(MOD-SP2): 70 ML
BH CV ECHO MEAS - SV(MOD-SP4): 87.4 ML
BH CV ECHO MEAS - SV(RVOT): 44.3 ML
BH CV ECHO MEAS - SV(TEICH): 76.2 ML
BH CV ECHO MEAS - TAPSE (>1.6): 2.14 CM2
BH CV ECHO MEAS - TR MAX VEL: 174.5 CM/SEC
BH CV NUCLEAR PRIOR STUDY: 3
BH CV STRESS BP STAGE 1: NORMAL
BH CV STRESS BP STAGE 2: NORMAL
BH CV STRESS BP STAGE 3: NORMAL
BH CV STRESS BP STAGE 4: NORMAL
BH CV STRESS BP STAGE 5: NORMAL
BH CV STRESS COMMENTS STAGE 1: NORMAL
BH CV STRESS COMMENTS STAGE 2: NORMAL
BH CV STRESS DOSE REGADENOSON STAGE 1: 0.4
BH CV STRESS DURATION MIN STAGE 1: 0
BH CV STRESS DURATION MIN STAGE 2: 4
BH CV STRESS DURATION SEC STAGE 1: 10
BH CV STRESS DURATION SEC STAGE 2: 0
BH CV STRESS HR STAGE 1: 76
BH CV STRESS HR STAGE 2: 95
BH CV STRESS HR STAGE 3: 95
BH CV STRESS HR STAGE 4: 86
BH CV STRESS HR STAGE 5: 86
BH CV STRESS O2 STAGE 1: 95
BH CV STRESS O2 STAGE 2: 97
BH CV STRESS O2 STAGE 3: 97
BH CV STRESS O2 STAGE 4: 96
BH CV STRESS O2 STAGE 5: 97
BH CV STRESS PROTOCOL 1: NORMAL
BH CV STRESS RECOVERY BP: NORMAL MMHG
BH CV STRESS RECOVERY HR: 86 BPM
BH CV STRESS STAGE 1: 1
BH CV STRESS STAGE 2: 2
BH CV STRESS STAGE 3: 3
BH CV STRESS STAGE 4: 4
BH CV STRESS STAGE 5: 5
BH CV VAS BP RIGHT ARM: NORMAL MMHG
BH CV XLRA - RV BASE: 3.9 CM
BH CV XLRA - TDI S': 13.9 CM/SEC
BUN BLD-MCNC: 22 MG/DL (ref 8–23)
BUN/CREAT SERPL: 22 (ref 7–25)
CALCIUM SPEC-SCNC: 9.8 MG/DL (ref 8.8–10.5)
CHLORIDE SERPL-SCNC: 102 MMOL/L (ref 98–107)
CO2 SERPL-SCNC: 24.2 MMOL/L (ref 22–29)
CREAT BLD-MCNC: 1 MG/DL (ref 0.76–1.27)
DEPRECATED RDW RBC AUTO: 50.2 FL (ref 37–54)
E/E' RATIO: 22
ERYTHROCYTE [DISTWIDTH] IN BLOOD BY AUTOMATED COUNT: 15.4 % (ref 11.5–14.5)
ERYTHROCYTE [SEDIMENTATION RATE] IN BLOOD: 7 MM/HR (ref 0–20)
GFR SERPL CREATININE-BSD FRML MDRD: 75 ML/MIN/1.73
GLUCOSE BLD-MCNC: 162 MG/DL (ref 65–99)
GLUCOSE BLDC GLUCOMTR-MCNC: 142 MG/DL (ref 70–130)
GLUCOSE BLDC GLUCOMTR-MCNC: 150 MG/DL (ref 70–130)
GLUCOSE BLDC GLUCOMTR-MCNC: 214 MG/DL (ref 70–130)
HBA1C MFR BLD: 5.8 % (ref 4.8–5.6)
HCT VFR BLD AUTO: 38.5 % (ref 42–52)
HGB BLD-MCNC: 13.1 G/DL (ref 14–18)
LEFT ATRIUM VOLUME INDEX: 23 ML/M2
LV EF 2D ECHO EST: 72 %
LV EF NUC BP: 70 %
MAXIMAL PREDICTED HEART RATE: 155 BPM
MCH RBC QN AUTO: 30.3 PG (ref 27–31)
MCHC RBC AUTO-ENTMCNC: 34 G/DL (ref 31–37)
MCV RBC AUTO: 88.9 FL (ref 80–94)
PERCENT MAX PREDICTED HR: 63.23 %
PLATELET # BLD AUTO: 224 10*3/MM3 (ref 140–500)
PMV BLD AUTO: 10.6 FL (ref 7.4–10.4)
POTASSIUM BLD-SCNC: 4.4 MMOL/L (ref 3.5–5.2)
RBC # BLD AUTO: 4.33 10*6/MM3 (ref 4.7–6.1)
SODIUM BLD-SCNC: 139 MMOL/L (ref 136–145)
STRESS BASELINE BP: NORMAL MMHG
STRESS BASELINE HR: 69 BPM
STRESS O2 SAT REST: 96 %
STRESS PERCENT HR: 74 %
STRESS POST ESTIMATED WORKLOAD: 1 METS
STRESS POST EXERCISE DUR SEC: 30 SEC
STRESS POST PEAK BP: NORMAL MMHG
STRESS POST PEAK HR: 98 BPM
STRESS TARGET HR: 132 BPM
TROPONIN T SERPL-MCNC: <0.01 NG/ML (ref 0–0.03)
TSH SERPL DL<=0.05 MIU/L-ACNC: 3.15 MIU/ML (ref 0.27–4.2)
WBC NRBC COR # BLD: 15.29 10*3/MM3 (ref 4.8–10.8)

## 2018-03-16 PROCEDURE — G0378 HOSPITAL OBSERVATION PER HR: HCPCS

## 2018-03-16 PROCEDURE — 93017 CV STRESS TEST TRACING ONLY: CPT

## 2018-03-16 PROCEDURE — 93016 CV STRESS TEST SUPVJ ONLY: CPT | Performed by: INTERNAL MEDICINE

## 2018-03-16 PROCEDURE — 99244 OFF/OP CNSLTJ NEW/EST MOD 40: CPT | Performed by: INTERNAL MEDICINE

## 2018-03-16 PROCEDURE — 83036 HEMOGLOBIN GLYCOSYLATED A1C: CPT | Performed by: FAMILY MEDICINE

## 2018-03-16 PROCEDURE — 93225 XTRNL ECG REC<48 HRS REC: CPT

## 2018-03-16 PROCEDURE — 93306 TTE W/DOPPLER COMPLETE: CPT

## 2018-03-16 PROCEDURE — 78452 HT MUSCLE IMAGE SPECT MULT: CPT

## 2018-03-16 PROCEDURE — 84484 ASSAY OF TROPONIN QUANT: CPT | Performed by: FAMILY MEDICINE

## 2018-03-16 PROCEDURE — 0 IOPAMIDOL PER 1 ML: Performed by: FAMILY MEDICINE

## 2018-03-16 PROCEDURE — A9500 TC99M SESTAMIBI: HCPCS | Performed by: FAMILY MEDICINE

## 2018-03-16 PROCEDURE — 25010000002 PERFLUTREN 6.52 MG/ML SUSPENSION: Performed by: FAMILY MEDICINE

## 2018-03-16 PROCEDURE — 82962 GLUCOSE BLOOD TEST: CPT

## 2018-03-16 PROCEDURE — 85027 COMPLETE CBC AUTOMATED: CPT | Performed by: FAMILY MEDICINE

## 2018-03-16 PROCEDURE — 93226 XTRNL ECG REC<48 HR SCAN A/R: CPT

## 2018-03-16 PROCEDURE — 71275 CT ANGIOGRAPHY CHEST: CPT

## 2018-03-16 PROCEDURE — 93018 CV STRESS TEST I&R ONLY: CPT | Performed by: INTERNAL MEDICINE

## 2018-03-16 PROCEDURE — 78452 HT MUSCLE IMAGE SPECT MULT: CPT | Performed by: INTERNAL MEDICINE

## 2018-03-16 PROCEDURE — 84443 ASSAY THYROID STIM HORMONE: CPT | Performed by: INTERNAL MEDICINE

## 2018-03-16 PROCEDURE — 0 TECHNETIUM SESTAMIBI: Performed by: FAMILY MEDICINE

## 2018-03-16 PROCEDURE — 63710000001 INSULIN ASPART PER 5 UNITS: Performed by: FAMILY MEDICINE

## 2018-03-16 PROCEDURE — 80048 BASIC METABOLIC PNL TOTAL CA: CPT | Performed by: FAMILY MEDICINE

## 2018-03-16 PROCEDURE — 25010000002 REGADENOSON 0.4 MG/5ML SOLUTION: Performed by: FAMILY MEDICINE

## 2018-03-16 PROCEDURE — 94799 UNLISTED PULMONARY SVC/PX: CPT

## 2018-03-16 PROCEDURE — 85652 RBC SED RATE AUTOMATED: CPT | Performed by: INTERNAL MEDICINE

## 2018-03-16 PROCEDURE — 93306 TTE W/DOPPLER COMPLETE: CPT | Performed by: INTERNAL MEDICINE

## 2018-03-16 RX ORDER — METFORMIN HYDROCHLORIDE 750 MG/1
750 TABLET, EXTENDED RELEASE ORAL
Status: DISCONTINUED | OUTPATIENT
Start: 2018-03-17 | End: 2018-03-16 | Stop reason: HOSPADM

## 2018-03-16 RX ORDER — METOPROLOL SUCCINATE 25 MG/1
25 TABLET, EXTENDED RELEASE ORAL
Qty: 30 TABLET | Refills: 2 | Status: SHIPPED | OUTPATIENT
Start: 2018-03-17 | End: 2020-09-03 | Stop reason: ALTCHOICE

## 2018-03-16 RX ORDER — VALSARTAN 80 MG/1
320 TABLET ORAL
Status: DISCONTINUED | OUTPATIENT
Start: 2018-03-16 | End: 2018-03-16 | Stop reason: HOSPADM

## 2018-03-16 RX ORDER — COLCHICINE 0.6 MG/1
0.6 TABLET ORAL 2 TIMES DAILY
Qty: 30 TABLET | Refills: 0 | Status: SHIPPED | OUTPATIENT
Start: 2018-03-16 | End: 2018-03-31

## 2018-03-16 RX ORDER — DOXAZOSIN MESYLATE 4 MG/1
4 TABLET ORAL NIGHTLY
Start: 2018-03-16 | End: 2021-06-28 | Stop reason: HOSPADM

## 2018-03-16 RX ORDER — METOPROLOL SUCCINATE 25 MG/1
25 TABLET, EXTENDED RELEASE ORAL
Status: DISCONTINUED | OUTPATIENT
Start: 2018-03-16 | End: 2018-03-16 | Stop reason: HOSPADM

## 2018-03-16 RX ORDER — DOXAZOSIN 2 MG/1
4 TABLET ORAL 2 TIMES DAILY
Status: DISCONTINUED | OUTPATIENT
Start: 2018-03-16 | End: 2018-03-16 | Stop reason: HOSPADM

## 2018-03-16 RX ORDER — HYDROCHLOROTHIAZIDE 25 MG/1
25 TABLET ORAL DAILY
Status: DISCONTINUED | OUTPATIENT
Start: 2018-03-16 | End: 2018-03-16 | Stop reason: HOSPADM

## 2018-03-16 RX ADMIN — IOPAMIDOL 100 ML: 755 INJECTION, SOLUTION INTRAVENOUS at 07:45

## 2018-03-16 RX ADMIN — TECHNETIUM TC 99M SESTAMIBI 1 DOSE: 1 INJECTION INTRAVENOUS at 08:00

## 2018-03-16 RX ADMIN — VALSARTAN 320 MG: 80 TABLET, FILM COATED ORAL at 16:41

## 2018-03-16 RX ADMIN — ALLOPURINOL 300 MG: 300 TABLET ORAL at 10:15

## 2018-03-16 RX ADMIN — REGADENOSON 0.4 MG: 0.08 INJECTION, SOLUTION INTRAVENOUS at 09:30

## 2018-03-16 RX ADMIN — DOXAZOSIN 4 MG: 2 TABLET ORAL at 10:16

## 2018-03-16 RX ADMIN — PANTOPRAZOLE SODIUM 40 MG: 40 TABLET, DELAYED RELEASE ORAL at 06:20

## 2018-03-16 RX ADMIN — METOPROLOL SUCCINATE 25 MG: 25 TABLET, EXTENDED RELEASE ORAL at 15:56

## 2018-03-16 RX ADMIN — INSULIN ASPART 4 UNITS: 100 INJECTION, SOLUTION INTRAVENOUS; SUBCUTANEOUS at 12:31

## 2018-03-16 RX ADMIN — IOPAMIDOL 50 ML: 755 INJECTION, SOLUTION INTRAVENOUS at 07:45

## 2018-03-16 RX ADMIN — FUROSEMIDE 20 MG: 20 TABLET ORAL at 10:20

## 2018-03-16 RX ADMIN — HYDROCHLOROTHIAZIDE 25 MG: 25 TABLET ORAL at 15:56

## 2018-03-16 RX ADMIN — ASPIRIN 81 MG CHEWABLE TABLET 81 MG: 81 TABLET CHEWABLE at 10:15

## 2018-03-16 RX ADMIN — METFORMIN HYDROCHLORIDE 750 MG: 750 TABLET, EXTENDED RELEASE ORAL at 10:17

## 2018-03-16 RX ADMIN — PERFLUTREN 13.04 MG: 6.52 INJECTION, SUSPENSION INTRAVENOUS at 10:17

## 2018-03-16 RX ADMIN — TECHNETIUM TC 99M SESTAMIBI 1 DOSE: 1 INJECTION INTRAVENOUS at 09:30

## 2018-03-16 NOTE — PROGRESS NOTES
Patient without complaints.  CT angiogram of chest is negative his echocardiogram shows hyperdynamic left ventricle otherwise normal his stress Cardiolite is also normal.  Cardiologist see patient consultation feels that he probably has pericarditis was sent home on colchicine problem office in 3-5 days

## 2018-03-16 NOTE — CONSULTS
Date of Hospital Visit: [unfilled]ENC@  Encounter Provider: Korin Mayes MD  Place of Service: [facility] CARDIOLOGY  Patient Name: Jefry Sabillon  :1950  Referral Provider: No ref. provider found    Chief complaint    Dyspnea, chest pounding.     History of Present Illness    Mr. sabillon is a 67-year-old gentleman with history of hypertension, hyperlipidemia and impaired fasting glucose.  He presented to the hospital with fatigue and chest pounding.  His heart rate was elevated at home.  He was short winded and so came to the emergency department.  On the way to the emergency department, he began having chest discomfort and pressure located in his back between shoulder blades as well as some left arm pain and left numbness.    He had a CTA of the chest done 3/16/18 which showed no evidence of pulmonary embolism mild coronary calcifications.  There was also a left thyroid nodule and trace amount of pericardial fluid.     He had a stress study done 3/16/18 which shows no evidence of ischemia.  An echocardiogram done 3/16/18 which showed mild concentric left ventricular hypertrophy with hyperdynamic left ventricle, ejection fraction greater than 70% and grade 2 diastolic dysfunction. There was no significant valve disease.       What he is describing is a chest pressure going into his back and dyspnea which is worse with lying down and better with sitting up and forward.  He also has a feeling his chest pounding.  This is worse when he lies down.  He stenosis with activity and at rest.  He's had no fever.  He did have an elevated white count when he arrived.  He recently got to steroid shots.  The fastest heart rate he's seen is 110.  His blood pressures been high at home running 190/100.  He's had no syncope.  He has had fatigue and he says he just hasn't felt well.  He did have an upper respiratory viral illness about 3 weeks ago.    He's never had myocardial infarction, heart  failure.  He's never had diabetes mellitus or renal disease.  He has a history of heart rhythm disturbances.  He does have obstructive sleep apnea and uses a CPAP machine for that.    He doesn't smoke.  He works at [employer].  He lives with his wife.  He doesn't drink significant amounts of alcohol.    There is no premature cardiovascular disease in his family.    Past Medical History:   Diagnosis Date   • Arthritis    • Asthma    • Cardiac disease    • Coronary artery disease    • Heart disease    • Hypertension    • Knee sprain    • Mini stroke    • EVE (obstructive sleep apnea)    • Rotator cuff syndrome        Past Surgical History:   Procedure Laterality Date   • HERNIA REPAIR  2007   • KNEE SURGERY  1979       Prescriptions Prior to Admission   Medication Sig Dispense Refill Last Dose   • allopurinol (ZYLOPRIM) 300 MG tablet    3/15/2018 at Unknown time   • aspirin 81 MG chewable tablet Chew 81 mg daily.   3/15/2018 at Unknown time   • calcium carbonate (CALCIUM 600) 600 MG tablet Take 600 mg by mouth Daily.   3/15/2018 at Unknown time   • doxazosin (CARDURA) 4 MG tablet 2 (Two) Times a Day.   3/15/2018 at Unknown time   • Krill Oil 500 MG capsule Take 500 mg by mouth.   3/15/2018 at Unknown time   • Loratadine (CLARITIN) 10 MG capsule Take 10 mg by mouth.   3/15/2018 at Unknown time   • Magnesium 300 MG capsule Take 300 mg by mouth.   3/15/2018 at Unknown time   • metFORMIN XR (GLUCOPHAGE-XR) 750 MG 24 hr tablet    3/15/2018 at Unknown time   • naproxen sodium (ALEVE) 220 MG tablet Take 220 mg by mouth 2 (two) times a day as needed for mild pain (1-3).   3/15/2018 at Unknown time   • pantoprazole (PROTONIX) 40 MG EC tablet    3/15/2018 at Unknown time   • valsartan-hydrochlorothiazide (DIOVAN-HCT) 320-25 MG per tablet Take 1 tablet by mouth Daily.   3/15/2018 at Unknown time   • furosemide (LASIX) 40 MG tablet 20 mg As Needed.   More than a month at Unknown time       Current Meds  Scheduled  "Meds:  allopurinol 300 mg Oral Daily   aspirin 81 mg Oral Daily   doxazosin 4 mg Oral BID   enoxaparin 40 mg Subcutaneous Q24H   furosemide 20 mg Oral Daily   insulin aspart 0-9 Units Subcutaneous 4x Daily AC & at Bedtime   metFORMIN  mg Oral Daily With Breakfast   pantoprazole 40 mg Oral Q AM     Continuous Infusions:   PRN Meds:.•  acetaminophen  •  bisacodyl  •  dextrose  •  dextrose  •  glucagon (human recombinant)  •  magnesium hydroxide  •  sodium chloride  •  Insert peripheral IV **AND** sodium chloride  •  sodium chloride    Allergies as of 03/15/2018 - Reviewed 03/15/2018   Allergen Reaction Noted   • Penicillins Dermatitis 06/28/2016       Social History     Social History   • Marital status:      Spouse name: N/A   • Number of children: N/A   • Years of education: N/A     Occupational History   • Not on file.     Social History Main Topics   • Smoking status: Never Smoker   • Smokeless tobacco: Never Used   • Alcohol use Yes   • Drug use: No   • Sexual activity: Defer     Other Topics Concern   • Not on file     Social History Narrative   • No narrative on file       Family History   Problem Relation Age of Onset   • Hypertension Father    • Heart disease Father        REVIEW OF SYSTEMS:   12 point ROS was performed and is negative except as outlined in HPI            Objective:   Temp:  [97.8 °F (36.6 °C)-98.6 °F (37 °C)] 97.8 °F (36.6 °C)  Heart Rate:  [60-90] 79  Resp:  [18-20] 20  BP: (123-152)/(68-87) 151/85  Body mass index is 38.92 kg/m².  Flowsheet Rows    Flowsheet Row First Filed Value   Admission Height 182.9 cm (72\") Documented at 03/15/2018 1356   Admission Weight 129 kg (285 lb) Documented at 03/15/2018 1356        Vitals:    03/16/18 1138   BP: 151/85   Pulse: 79   Resp: 20   Temp: 97.8 °F (36.6 °C)   SpO2: 96%       General Appearance:    Alert, cooperative, in no acute distress   Head:    Normocephalic, without obvious abnormality, atraumatic   Eyes:            Lids and " lashes normal, conjunctivae and sclerae normal, no   icterus, no pallor, corneas clear,   Ears:    Ears appear intact with no abnormalities noted   Throat:   No oral lesions, no thrush, oral mucosa moist   Neck:   No adenopathy, supple, trachea midline, no thyromegaly, no   carotid bruit, no JVD   Back:     No kyphosis present, no scoliosis present, no skin lesions, erythema or scars, no tenderness  range of motion normal   Lungs:     Clear to auscultation,respirations regular, even and unlabored    Heart:    Regular rhythm and normal rate, normal S1 and S2, no murmur, no gallop,pericardial friction rub, no click   Chest Wall:    No abnormalities observed   Abdomen:     Normal bowel sounds, no masses, no organomegaly, soft        non-tender, non-distended, no guarding, no rebound  tenderness   Extremities:   Moves all extremities well, no edema, no cyanosis, no redness   Pulses:   Pulses palpable and equal bilaterally. Normal radial, carotid, dorsalis pedis and posterior tibial pulses bilaterally.    Skin:  Psychiatric:   No bleeding, bruising or rash    Alert and oriented x 3, normal mood and affect                 Lab Review:      Results from last 7 days  Lab Units 03/16/18  0358  03/15/18  1420   SODIUM mmol/L 139  < > 136   POTASSIUM mmol/L 4.4  < > 4.2   CHLORIDE mmol/L 102  < > 100   CO2 mmol/L 24.2  < > 21.3*   BUN mg/dL 22  < > 18   CREATININE mg/dL 1.00  < > 1.06   CALCIUM mg/dL 9.8  < > 10.2   BILIRUBIN mg/dL  --   --  0.8   ALK PHOS U/L  --   --  87   ALT (SGPT) U/L  --   --  61*   AST (SGOT) U/L  --   --  27   GLUCOSE mg/dL 162*  < > 281*   < > = values in this interval not displayed.    Results from last 7 days  Lab Units 03/16/18  0358 03/15/18  1420   TROPONIN T ng/mL <0.010 <0.010         Results from last 7 days  Lab Units 03/16/18  0358 03/15/18  1938 03/15/18  1420   WBC 10*3/mm3 15.29* 15.60* 15.29*   HEMOGLOBIN g/dL 13.1* 13.6* 13.6*   HEMATOCRIT % 38.5* 38.9* 39.3*   PLATELETS 10*3/mm3 224 134  214                 I personally viewed and interpreted the patient's EKG/Telemetry data - sinus tachycardia with right bundle branch block  )  Patient Active Problem List   Diagnosis   • Primary osteoarthritis of knees, bilateral   • Acute dyspnea     Assessment and Plan:    1. Likely pericarditis based on history and exam.  Start colchicine.   2. Hypertension, not well controlled.   3. Chest pounding, set up holter.   4. Hyperlipidemia.   5. Obesity.   6. EVE on CPAP.   7. Thyroid nodule, TSH pending.  outpt ultrasound  8. Gout.     Ok to d/c, f/u in office in 1 month.      Korin Mayes MD  03/16/18  2:11 PM.  Time spent in reviewing chart, discussion and examination:

## 2018-03-16 NOTE — NURSING NOTE
Discharge Planning Assessment   Linda Holm     Patient Name: Jefry Sabillon  MRN: [id number]  Today's Date: 3/16/2018    Admit Date: 3/15/2018          Discharge Needs Assessment     Row Name 03/16/18 1404       Living Environment    Lives With spouse    Current Living Arrangements home/apartment/condo    Primary Care Provided by self    Provides Primary Care For no one    Family Caregiver if Needed spouse    Able to Return to Prior Arrangements yes       Resource/Environmental Concerns    Resource/Environmental Concerns none    Transportation Concerns car, none       Transition Planning    Patient/Family Anticipates Transition to home       Discharge Needs Assessment    Readmission Within the Last 30 Days no previous admission in last 30 days            Discharge Plan     Row Name 03/16/18 1405       Plan    Plan Plan is home.    Patient/Family in Agreement with Plan yes    Plan Comments Patient in agreement with speaking to  with wife at bedside.  He lives in a multi level home but is able to stay on one level.  He has been independent with ADLs.  He has a CPAP machine with "[employer]".  He has a cane but only uses at times.  He does not have a nebulizer machine, home oxygen or home health services.  He fills scripts at [facility] in [place] and he has no issues paying or getting his medicines.  He does not have a Living Will and per request gave him information.  Referral made to Kayla Starr for Living Will.  Explained MOON notice ( observation status); verbalizes understanding.  Gave patient copy of signed notice.  Verified home address and phone number on face sheet.  Patient says the plan is home when medically ready.   will continue to follow.         Destination     No service coordination in this encounter.      Durable Medical Equipment     No service coordination in this encounter.      Dialysis/Infusion     No service coordination in this encounter.      Home Medical Care      No service coordination in this encounter.      Social Care     No service coordination in this encounter.                Demographic Summary     Row Name 03/16/18 3189       General Information    Admission Type observation    Arrived From home    Referral Source admission list    Preferred Language English     Used During This Interaction no       Contact Information    Permission Granted to Share Info With             Functional Status    No documentation.           Psychosocial    No documentation.           Abuse/Neglect    No documentation.           Legal    No documentation.           Substance Abuse    No documentation.           Patient Forms    No documentation.         Apple Chakraborty RN

## 2018-03-16 NOTE — PLAN OF CARE
Problem: Patient Care Overview  Goal: Discharge Needs Assessment  Outcome: Ongoing (interventions implemented as appropriate)   03/15/18 2000 03/15/18 2200 03/15/18 6461   Discharge Needs Assessment   Readmission Within the Last 30 Days --  --  --    Patient/Family Anticipates Transition to --  --  --    Patient/Family Anticipated Services at Transition --  --  none   Transportation Concerns --  --  --    Transportation Anticipated --  car, drives self --    Discharge Coordination/Progress --  --  --    Disability   Equipment Currently Used at Home none --  --     03/16/18 1404 03/16/18 1418   Discharge Needs Assessment   Readmission Within the Last 30 Days no previous admission in last 30 days --    Patient/Family Anticipates Transition to home --    Patient/Family Anticipated Services at Transition --  --    Transportation Concerns car, none --    Transportation Anticipated --  --    Discharge Coordination/Progress --  Patient in agreement with speaking to  with wife at bedside. He lives in a multi level home but is able to stay on one level. He has been independent with ADLs. He has a CPAP machine with [employer]. He has a cane but only uses at times. He does not have a nebulizer machine, home oxygen or home health services. He does not have a Living Will and per request gave him information. Referral made to Kayla Starr for Living Will. Explained MOON notice ( observation status); verbalizes understanding. Gave patient copy of signed notice. Verified home address and phone number on face sheet. Patient says the plan is home when medically ready.  will continue to follow.    Disability   Equipment Currently Used at Home --  --

## 2018-03-16 NOTE — PLAN OF CARE
Problem: Patient Care Overview  Goal: Plan of Care Review  Outcome: Ongoing (interventions implemented as appropriate)   03/16/18 4361   Coping/Psychosocial   Plan of Care Reviewed With patient;spouse   Plan of Care Review   Progress improving   OTHER   Outcome Summary stress test and Echo completed today, denies chest pain, labs pending,cardiologhy consult     Goal: Individualization and Mutuality  Outcome: Ongoing (interventions implemented as appropriate)    Goal: Discharge Needs Assessment  Outcome: Ongoing (interventions implemented as appropriate)      Problem: Cardiac: ACS (Acute Coronary Syndrome) (Adult)  Goal: Signs and Symptoms of Listed Potential Problems Will be Absent, Minimized or Managed (Cardiac: ACS)  Outcome: Ongoing (interventions implemented as appropriate)

## 2018-03-16 NOTE — PLAN OF CARE
Problem: Patient Care Overview  Goal: Plan of Care Review  Outcome: Outcome(s) achieved Date Met: 03/16/18 03/16/18 4153   Coping/Psychosocial   Plan of Care Reviewed With patient;spouse   Plan of Care Review   Progress improving   OTHER   Outcome Summary discharge home     Goal: Individualization and Mutuality  Outcome: Outcome(s) achieved Date Met: 03/16/18    Goal: Discharge Needs Assessment  Outcome: Outcome(s) achieved Date Met: 03/16/18    Goal: Interprofessional Rounds/Family Conf  Outcome: Outcome(s) achieved Date Met: 03/16/18      Problem: Cardiac: ACS (Acute Coronary Syndrome) (Adult)  Goal: Signs and Symptoms of Listed Potential Problems Will be Absent, Minimized or Managed (Cardiac: ACS)  Outcome: Outcome(s) achieved Date Met: 03/16/18

## 2018-03-16 NOTE — PROGRESS NOTES
"Daily Progress Note:    Subjective: Patient feels much better no further chest discomfort still short of breath was slept well    Flowsheet Rows    Flowsheet Row First Filed Value   Admission Height 182.9 cm (72\") Documented at 03/15/2018 1356   Admission Weight 129 kg (285 lb) Documented at 03/15/2018 1356          Documented weights    03/15/18 1356 03/15/18 2035   Weight: 129 kg (285 lb) 130 kg (287 lb 2 oz)       Patient Vitals for the past 24 hrs:   BP Temp Temp src Pulse Resp SpO2 Height Weight   03/16/18 0617 138/78 97.9 °F (36.6 °C) - 60 20 94 % - -   03/16/18 0405 126/72 98.6 °F (37 °C) Oral 68 20 93 % - -   03/16/18 0017 143/73 98.5 °F (36.9 °C) - 67 20 95 % - -   03/15/18 2035 152/71 98.2 °F (36.8 °C) - 81 20 93 % 182.9 cm (72\") 130 kg (287 lb 2 oz)   03/15/18 1918 123/68 98.4 °F (36.9 °C) Oral 90 20 94 % - -   03/15/18 1356 140/68 98.7 °F (37.1 °C) Oral 100 20 94 % 182.9 cm (72\") 129 kg (285 lb)       130 kg (287 lb 2 oz)    No intake or output data in the 24 hours ending 03/16/18 0739    Review of Systems   Constitutional: Negative for activity change, appetite change and fatigue.   Respiratory: Negative for cough, chest tightness, shortness of breath and wheezing.    Cardiovascular: Negative for chest pain.   Gastrointestinal: Negative for abdominal distention, abdominal pain, diarrhea, nausea and vomiting.   Genitourinary: Negative for frequency.   Skin: Negative for rash.   Psychiatric/Behavioral: Negative for agitation.       Physical Exam   Constitutional: He appears well-developed and well-nourished.   Morbidly obese   HENT:   Head: Normocephalic.   Mouth/Throat: Oropharynx is clear and moist.   Eyes: Conjunctivae are normal.   Neck: Normal range of motion. No JVD present. No thyromegaly present.   Cardiovascular: Normal rate and regular rhythm.    Murmur (Right upper sternal border) heard.   Systolic murmur is present with a grade of 3/6   Pulmonary/Chest: Effort normal and breath sounds normal. " No respiratory distress. He has no wheezes. He has no rales.   Abdominal: Soft. Bowel sounds are normal. He exhibits no distension. There is no tenderness. There is no guarding.   Neurological: He is alert.   Skin: Skin is warm and dry. No rash noted.   Nursing note and vitals reviewed.          Medication Review:     Medication Review:   I have reviewed the patient's current medication list    Current Facility-Administered Medications:   •  acetaminophen (TYLENOL) tablet 650 mg, 650 mg, Oral, Q4H PRN, Kash Simpson MD  •  allopurinol (ZYLOPRIM) tablet 300 mg, 300 mg, Oral, Daily, Kash Simpson MD  •  aspirin chewable tablet 81 mg, 81 mg, Oral, Daily, Kash Simpson MD  •  bisacodyl (DULCOLAX) EC tablet 5 mg, 5 mg, Oral, Daily PRN, Kash Simpson MD  •  dextrose (D50W) solution 25 g, 25 g, Intravenous, Q15 Min PRN, Kash Simpson MD  •  dextrose (GLUTOSE) oral gel 15 g, 15 g, Oral, Q15 Min PRN, Kash Simpson MD  •  doxazosin (CARDURA) tablet 4 mg, 4 mg, Oral, BID, Kash Simpson MD, 4 mg at 03/15/18 2126  •  enoxaparin (LOVENOX) syringe 40 mg, 40 mg, Subcutaneous, Q24H, Kash Simpson MD, 40 mg at 03/15/18 2022  •  furosemide (LASIX) tablet 20 mg, 20 mg, Oral, Daily, Kash Simpson MD, 20 mg at 03/15/18 2022  •  glucagon (GLUCAGEN) injection 1 mg, 1 mg, Subcutaneous, PRN, Kash Simpson MD  •  insulin aspart (novoLOG) injection 0-9 Units, 0-9 Units, Subcutaneous, 4x Daily AC & at Bedtime, Kash Simpson MD, 4 Units at 03/15/18 2125  •  [COMPLETED] iopamidol (ISOVUE-370) 76 % injection 100 mL, 100 mL, Intravenous, Once in imaging, Kash Simpson MD, 100 mL at 03/16/18 0745  •  [COMPLETED] iopamidol (ISOVUE-370) 76 % injection 50 mL, 50 mL, Intravenous, Once in imaging, Kash Simpson MD, 50 mL at 03/16/18 0745  •  magnesium hydroxide (MILK OF MAGNESIA) 400 MG/5ML suspension  30 mL, 30 mL, Oral, Daily PRN, Kash Simpson MD  •  metFORMIN ER (GLUCOPHAGE-XR) 24 hr tablet 750 mg, 750 mg, Oral, Daily With Breakfast, Kash Simpson MD  •  pantoprazole (PROTONIX) EC tablet 40 mg, 40 mg, Oral, Q AM, Kash Simpson MD, 40 mg at 03/16/18 0620  •  sodium chloride 0.9 % flush 1-10 mL, 1-10 mL, Intravenous, PRN, Kash Simpson MD  •  Insert peripheral IV, , , Once **AND** sodium chloride 0.9 % flush 10 mL, 10 mL, Intravenous, PRN, Jose Ramon Aparicio MD  •  sodium chloride 0.9 % infusion 40 mL, 40 mL, Intravenous, PRN, Kash Simpson MD  •  [COMPLETED] technetium sestamibi (CARDIOLITE) injection 1 dose, 1 dose, Intravenous, Once in imaging, Kash Simpson MD, 1 dose at 03/16/18 0800      Labs:    Results from last 7 days  Lab Units 03/16/18  0358 03/15/18  1938 03/15/18  1420   WBC 10*3/mm3 15.29* 15.60* 15.29*   HEMOGLOBIN g/dL 13.1* 13.6* 13.6*   HEMATOCRIT % 38.5* 38.9* 39.3*   PLATELETS 10*3/mm3 224 225 214       Results from last 7 days  Lab Units 03/16/18  0358 03/15/18  1938 03/15/18  1420   SODIUM mmol/L 139 138 136   POTASSIUM mmol/L 4.4 4.4 4.2   CHLORIDE mmol/L 102 99 100   CO2 mmol/L 24.2 22.9 21.3*   BUN mg/dL 22 20 18   CREATININE mg/dL 1.00 1.15 1.06   CALCIUM mg/dL 9.8 10.5 10.2   BILIRUBIN mg/dL  --   --  0.8   ALK PHOS U/L  --   --  87   ALT (SGPT) U/L  --   --  61*   AST (SGOT) U/L  --   --  27   GLUCOSE mg/dL 162* 251* 281*           Lab Results (last 24 hours)     Procedure Component Value Units Date/Time    POC Glucose Once [[id number]]  (Abnormal) Collected:  03/16/18 0730    Specimen:  Blood Updated:  03/16/18 0737     Glucose 142 (H) mg/dL     Narrative:       Meter: [id number] : [id number] Ingrid Kohler NURSING ASSISTANT    Troponin [[id number]]  (Normal) Collected:  03/16/18 0358    Specimen:  Blood Updated:  03/16/18 0502     Troponin T <0.010 ng/mL     Narrative:       Troponin T Reference Ranges:  Less than  0.03 ng/mL:    Negative for AMI  0.03 to 0.09 ng/mL:      Indeterminant for AMI  Greater than 0.09 ng/mL: Positive for AMI    Basic Metabolic Panel [[id number]]  (Abnormal) Collected:  03/16/18 0358    Specimen:  Blood Updated:  03/16/18 0500     Glucose 162 (H) mg/dL      BUN 22 mg/dL      Creatinine 1.00 mg/dL      Sodium 139 mmol/L      Potassium 4.4 mmol/L      Chloride 102 mmol/L      CO2 24.2 mmol/L      Calcium 9.8 mg/dL      eGFR Non African Amer 75 mL/min/1.73      BUN/Creatinine Ratio 22.0     Anion Gap 12.8 mmol/L     Narrative:       GFR Normal >60  Chronic Kidney Disease <60  Kidney Failure <15    CBC (No Diff) [[id number]]  (Abnormal) Collected:  03/16/18 0358    Specimen:  Blood Updated:  03/16/18 0438     WBC 15.29 (H) 10*3/mm3      RBC 4.33 (L) 10*6/mm3      Hemoglobin 13.1 (L) g/dL      Hematocrit 38.5 (L) %      MCV 88.9 fL      MCH 30.3 pg      MCHC 34.0 g/dL      RDW 15.4 (H) %      RDW-SD 50.2 fl      MPV 10.6 (H) fL      Platelets 224 10*3/mm3     POC Glucose Once [[id number]]  (Abnormal) Collected:  03/15/18 2040    Specimen:  Blood Updated:  03/15/18 2046     Glucose 249 (H) mg/dL     Narrative:       Meter: [id number] : [id number] Miles Zafar RN    Basic Metabolic Panel [[id number]]  (Abnormal) Collected:  03/15/18 1938    Specimen:  Blood Updated:  03/15/18 2003     Glucose 251 (H) mg/dL      BUN 20 mg/dL      Creatinine 1.15 mg/dL      Sodium 138 mmol/L      Potassium 4.4 mmol/L      Chloride 99 mmol/L      CO2 22.9 mmol/L      Calcium 10.5 mg/dL      eGFR Non African Amer 64 mL/min/1.73      BUN/Creatinine Ratio 17.4     Anion Gap 16.1 mmol/L     Narrative:       GFR Normal >60  Chronic Kidney Disease <60  Kidney Failure <15    CBC (No Diff) [[id number]]  (Abnormal) Collected:  03/15/18 1938    Specimen:  Blood Updated:  03/15/18 1942     WBC 15.60 (H) 10*3/mm3      RBC 4.47 (L) 10*6/mm3      Hemoglobin 13.6 (L) g/dL      Hematocrit 38.9 (L) %      MCV 87.0 fL      MCH 30.4 pg       MCHC 35.0 g/dL      RDW 15.2 (H) %      RDW-SD 48.3 fl      MPV 10.3 fL      Platelets 225 10*3/mm3     BNP [[id number]]  (Normal) Collected:  03/15/18 1420    Specimen:  Blood Updated:  03/15/18 1633     proBNP 259.5 pg/mL     Narrative:       Among patients with dyspnea, NT-proBNP is highly sensitive for the detection of acute congestive heart failure. In addition NT-proBNP of <300 pg/ml effectively rules out acute congestive heart failure with 99% negative predictive value.    Troponin [[id number]]  (Normal) Collected:  03/15/18 1420    Specimen:  Blood Updated:  03/15/18 1445     Troponin T <0.010 ng/mL     Narrative:       Troponin T Reference Ranges:  Less than 0.03 ng/mL:    Negative for AMI  0.03 to 0.09 ng/mL:      Indeterminant for AMI  Greater than 0.09 ng/mL: Positive for AMI    Comprehensive Metabolic Panel [[id number]]  (Abnormal) Collected:  03/15/18 1420    Specimen:  Blood Updated:  03/15/18 1443     Glucose 281 (H) mg/dL      BUN 18 mg/dL      Creatinine 1.06 mg/dL      Sodium 136 mmol/L      Potassium 4.2 mmol/L      Chloride 100 mmol/L      CO2 21.3 (L) mmol/L      Calcium 10.2 mg/dL      Total Protein 6.8 g/dL      Albumin 4.30 g/dL      ALT (SGPT) 61 (H) U/L      AST (SGOT) 27 U/L      Alkaline Phosphatase 87 U/L      Total Bilirubin 0.8 mg/dL      eGFR Non African Amer 70 mL/min/1.73      Globulin 2.5 gm/dL      A/G Ratio 1.7 g/dL      BUN/Creatinine Ratio 17.0     Anion Gap 14.7 mmol/L     D-dimer, Quantitative [[id number]]  (Normal) Collected:  03/15/18 1420    Specimen:  Blood Updated:  03/15/18 1440     D-Dimer, Quantitative 0.35 MCGFEU/mL     Narrative:       Can be elevated in, but is not diagnostic for deep vein thrombosis (DVT) or pulmonary embolis (PE).  It is also elevated in other medical conditions.  Clinical correlation is required.  The negative cut-off value for the D-Dimer is 0.50 mcg FEU/mL for DVT and PE.    CBC & Differential [[id number]] Collected:  03/15/18 1420    Specimen:   Blood Updated:  03/15/18 1426    Narrative:       The following orders were created for panel order CBC & Differential.  Procedure                               Abnormality         Status                     ---------                               -----------         ------                     CBC Auto Differential[[id number]]        Abnormal            Final result                 Please view results for these tests on the individual orders.    CBC Auto Differential [[id number]]  (Abnormal) Collected:  03/15/18 1420    Specimen:  Blood Updated:  03/15/18 1426     WBC 15.29 (H) 10*3/mm3      RBC 4.49 (L) 10*6/mm3      Hemoglobin 13.6 (L) g/dL      Hematocrit 39.3 (L) %      MCV 87.5 fL      MCH 30.3 pg      MCHC 34.6 g/dL      RDW 14.9 (H) %      RDW-SD 47.5 fl      MPV 10.5 (H) fL      Platelets 214 10*3/mm3      Neutrophil % 90.9 (H) %      Lymphocyte % 5.8 (L) %      Monocyte % 2.8 (L) %      Eosinophil % 0.0 %      Basophil % 0.1 %      Immature Grans % 0.4 %      Neutrophils, Absolute 13.91 (H) 10*3/mm3      Lymphocytes, Absolute 0.88 10*3/mm3      Monocytes, Absolute 0.43 10*3/mm3      Eosinophils, Absolute 0.00 (L) 10*3/mm3      Basophils, Absolute 0.01 10*3/mm3      Immature Grans, Absolute 0.06 (H) 10*3/mm3      nRBC 0.0 /100 WBC               Radiology:  Imaging Results (last 24 hours)     Procedure Component Value Units Date/Time    CT Angiogram Chest With & Without Contrast [[id number]] Updated:  03/16/18 0703    XR Chest 2 View [[id number]] Collected:  03/15/18 1537     Updated:  03/15/18 1540    Narrative:       INDICATION:  Shortness of air today     COMPARISON:  None.     FINDINGS: PA and lateral views of the chest.The heart and mediastinum  are normal in size and configuration. There is mild prominence of the  central pulmonary vessels with mild bilateral perihilar fullness. No  pneumothorax or pleural effusions.       Impression:       Mild central vascular prominence and perihilar fullness  suspicious  for mild CHF. FINDINGS could also be seen in the setting of  central pneumonitis     This report was finalized on 3/15/2018 3:38 PM by Dr. Terry Metcalf MD.             Cardiology:  ECG/EMG Results (last 24 hours)     Procedure Component Value Units Date/Time    ECG 12 Lead [[id number]] Collected:  03/15/18 1400     Updated:  03/15/18 1418          I have reviewed consult notes.    Assessment and Plan:        1.  Chest pain with shortness of breath and palpitations in a patient with multiple risk factors.  Acute coronary syndrome has been ruled out.  He still short of breath even though the d-dimer is negative will call to get a CT of the chest to evaluate his shortness of breath.  His leukocytosis probably explained by his recent intra-articular steroids but will rule out underlying pneumonia or lung pathology causing his shortness of breath     2.  New onset systolic murmur echocardiogram pending     3.  Impaired fasting glucose with hyperglycemia patient did receive intra-articular steroids couple days ago which could explain his leukocytosis as well as elevated blood sugar will continue monitor     4.  Hypertension at this time patient is well controlled continue medications     5.  Hyperlipidemia poorly controlled because of his intolerance to statins will continue to monitor

## 2018-03-16 NOTE — DISCHARGE SUMMARY
Jefry Sabillon  1950  [id number]        Discharge Summary    Date of Admission: 3/15/2018  Date of Discharge:  3/16/2018    Primary Discharge Diagnoses: Pericarditis    Secondary Discharge Diagnoses:   Hypertension  Hyperlipidemia  Impaired fasting glucose with hyperglycemia due to steroids  Osteoarthritis  Leukocytosis secondary to recent steroids  Chest pain acute coronary syndrome ruled out      PCP  Patient Care Team:  Kash Simpson MD as PCP - General (Family Medicine)    Consults:   Consults     Date and Time Order Name Status Description    3/16/2018 1329 Inpatient Cardiology Consult Completed     3/15/2018 1533 Family Medicine Consult              History of Present Illness:  67-year-old white male with history of hypertension hyperlipidemia and impaired fasting glucose with no prior cardiac history who presented to the emergency room with chest discomfort and shortness of breath.  Patient states return for marked by 24 hours ago just not feeling well felt.  Tired and felt his heart was pounding.  He took his blood pressure several times during the day and was initially elevated heart rate was in the 110.  Patient thought he was just tired and went to bed and got up and so was having problems with his heart pounding.  He was somewhat little short of breath discomfort progressively worse and therefore he came to emergency room.  On his way to the emergency room he started having some chest discomfort and chest pressure mostly located in the back and between his shoulder blades and had some left arm pain and numbness in his left arm became cold below the elbow.  Had has no prior cardiac history except set multiple years ago apparently had a chest contusion from a third of the chest and had a cardiac catheter that time which was negative       Hospital Course  After admission to telemetry bed patient was monitored for any further tachyarrhythmias.  His shortness of breath chest pain premature  resolved after admission.  He had a echocardiogram which showed a hyperdynamic left ventricle with ejection fraction 70% with no significant valvular problems.  His CT is recommended as chest does not show any evidence of pulmonary embolism or pneumonia.  He also had a Lexiscan Cardiolite which was negative.  Cardiology saw patient consultation because of his new murmur but felt there was more indicative of a pericardial rub.  They felt that that his symptoms was probably due to pericarditis.  Patient is discharged home on colchicine twice a day for 15 days and he was changed over to by mouth metoprolol XL once a day he'll follow-up in my office in 3-5 days he was pain-free at time of discharge      Operations and Procedures Performed:       Xr Chest 2 View    Result Date: 3/15/2018  Narrative: INDICATION:  Shortness of air today  COMPARISON:  None.  FINDINGS: PA and lateral views of the chest.The heart and mediastinum are normal in size and configuration. There is mild prominence of the central pulmonary vessels with mild bilateral perihilar fullness. No pneumothorax or pleural effusions.      Impression: Mild central vascular prominence and perihilar fullness suspicious for mild CHF. FINDINGS could also be seen in the setting of central pneumonitis  This report was finalized on 3/15/2018 3:38 PM by Dr. Terry Metcalf MD.      Ct Angiogram Chest With & Without Contrast    Result Date: 3/16/2018  Narrative: EXAM: CTA CHEST PE PROTOCOL WITH IV CONTRAST.  DATE: 3/16/2018.  HISTORY: SHORTNESS OF BREATH AND CHEST PAIN WHICH BEGAN YESTERDAY..  COMPARISON: PA AND LATERAL CHEST 3/15/2018.  NO PRIOR CT CHEST OR NUCLEAR MEDICINE MYOCARDIAL PERFUSION SCAN IN THE PAST 12 MONTHS.   TECHNIQUE: 2.5 mm axial images through the chest after intravenous contrast administration.. 3-D coronal MIP reformatted images were obtained. Radiation dose reduction techniques were utilized, including automated exposure control and exposure  modulation based on body size.  FINDINGS: No pulmonary embolism. No thoracic aortic aneurysm or aortic dissection. Mild coronary calcification.  Heart size within normal limits. No pericardial effusion or pleural effusion. No pathologic adenopathy. Heterogeneous appearance of bilateral thyroid lobes with low density left thyroid lobe lesion measuring 1.4 cm. Lungs are free of acute airspace disease.  Diverticular changes are present within the colon without evidence of acute diverticulitis.      Impression: 1. No acute chest findings. No pulmonary embolism. 2. Mild coronary calcifications. 3. Colonic diverticulosis. 4. Low-density left thyroid nodule which can be correlated to thyroid ultrasound and/or thyroid function tests on a nonemergent basis.  This report was finalized on 3/16/2018 8:21 AM by Dr. Lenora Ball MD.        Last Lab Results:     Results from last 7 days  Lab Units 03/16/18  0358 03/15/18  1938 03/15/18  1420   WBC 10*3/mm3 15.29* 15.60* 15.29*   HEMOGLOBIN g/dL 13.1* 13.6* 13.6*   HEMATOCRIT % 38.5* 38.9* 39.3*   PLATELETS 10*3/mm3 224 225 214       Results from last 7 days  Lab Units 03/16/18  0358 03/15/18  1938 03/15/18  1420   SODIUM mmol/L 139 138 136   POTASSIUM mmol/L 4.4 4.4 4.2   CHLORIDE mmol/L 102 99 100   CO2 mmol/L 24.2 22.9 21.3*   BUN mg/dL 22 20 18   CREATININE mg/dL 1.00 1.15 1.06   CALCIUM mg/dL 9.8 10.5 10.2   BILIRUBIN mg/dL  --   --  0.8   ALK PHOS U/L  --   --  87   ALT (SGPT) U/L  --   --  61*   AST (SGOT) U/L  --   --  27   GLUCOSE mg/dL 162* 251* 281*           Lab Results (last 24 hours)     Procedure Component Value Units Date/Time    POC Glucose Once [[id number]]  (Abnormal) Collected:  03/16/18 1633    Specimen:  Blood Updated:  03/16/18 1639     Glucose 150 (H) mg/dL     Narrative:       Meter: [id number] : [id number] Ingrid Kohler NURSING ASSISTANT    Sedimentation Rate [[id number]]  (Normal) Collected:  03/16/18 0526    Specimen:  Blood Updated:  03/16/18 7302      Sed Rate 7 mm/hr     TSH [[id number]]  (Normal) Collected:  03/16/18 1506    Specimen:  Blood Updated:  03/16/18 1537     TSH 3.150 mIU/mL     POC Glucose Once [[id number]]  (Abnormal) Collected:  03/16/18 1147    Specimen:  Blood Updated:  03/16/18 1156     Glucose 214 (H) mg/dL     Narrative:       Meter: [id number] : [id number] Ingrid Kohler NURSING ASSISTANT    Hemoglobin A1c [[id number]]  (Abnormal) Collected:  03/16/18 0358    Specimen:  Blood Updated:  03/16/18 0856     Hemoglobin A1C 5.80 (H) %     Narrative:       Hemoglobin A1C Ranges:    Increased Risk for Diabetes  5.7% to 6.4%  Diabetes                     >= 6.5%  Diabetic Goal                < 7.0%    POC Glucose Once [[id number]]  (Abnormal) Collected:  03/16/18 0730    Specimen:  Blood Updated:  03/16/18 0737     Glucose 142 (H) mg/dL     Narrative:       Meter: [id number] : [id number] Ingrid Kohler NURSING ASSISTANT    Troponin [[id number]]  (Normal) Collected:  03/16/18 0358    Specimen:  Blood Updated:  03/16/18 0502     Troponin T <0.010 ng/mL     Narrative:       Troponin T Reference Ranges:  Less than 0.03 ng/mL:    Negative for AMI  0.03 to 0.09 ng/mL:      Indeterminant for AMI  Greater than 0.09 ng/mL: Positive for AMI    Basic Metabolic Panel [[id number]]  (Abnormal) Collected:  03/16/18 0358    Specimen:  Blood Updated:  03/16/18 0500     Glucose 162 (H) mg/dL      BUN 22 mg/dL      Creatinine 1.00 mg/dL      Sodium 139 mmol/L      Potassium 4.4 mmol/L      Chloride 102 mmol/L      CO2 24.2 mmol/L      Calcium 9.8 mg/dL      eGFR Non African Amer 75 mL/min/1.73      BUN/Creatinine Ratio 22.0     Anion Gap 12.8 mmol/L     Narrative:       GFR Normal >60  Chronic Kidney Disease <60  Kidney Failure <15    CBC (No Diff) [[id number]]  (Abnormal) Collected:  03/16/18 0358    Specimen:  Blood Updated:  03/16/18 0438     WBC 15.29 (H) 10*3/mm3      RBC 4.33 (L) 10*6/mm3      Hemoglobin 13.1 (L) g/dL      Hematocrit 38.5 (L) %      MCV 88.9  fL      MCH 30.3 pg      MCHC 34.0 g/dL      RDW 15.4 (H) %      RDW-SD 50.2 fl      MPV 10.6 (H) fL      Platelets 224 10*3/mm3     POC Glucose Once [[id number]]  (Abnormal) Collected:  03/15/18 2040    Specimen:  Blood Updated:  03/15/18 2046     Glucose 249 (H) mg/dL     Narrative:       Meter: [id number] : [id number] Miles Zafar RN    Basic Metabolic Panel [[id number]]  (Abnormal) Collected:  03/15/18 1938    Specimen:  Blood Updated:  03/15/18 2003     Glucose 251 (H) mg/dL      BUN 20 mg/dL      Creatinine 1.15 mg/dL      Sodium 138 mmol/L      Potassium 4.4 mmol/L      Chloride 99 mmol/L      CO2 22.9 mmol/L      Calcium 10.5 mg/dL      eGFR Non African Amer 64 mL/min/1.73      BUN/Creatinine Ratio 17.4     Anion Gap 16.1 mmol/L     Narrative:       GFR Normal >60  Chronic Kidney Disease <60  Kidney Failure <15    CBC (No Diff) [[id number]]  (Abnormal) Collected:  03/15/18 1938    Specimen:  Blood Updated:  03/15/18 1942     WBC 15.60 (H) 10*3/mm3      RBC 4.47 (L) 10*6/mm3      Hemoglobin 13.6 (L) g/dL      Hematocrit 38.9 (L) %      MCV 87.0 fL      MCH 30.4 pg      MCHC 35.0 g/dL      RDW 15.2 (H) %      RDW-SD 48.3 fl      MPV 10.3 fL      Platelets 225 10*3/mm3               PROCEDURES          Allergies:  is allergic to penicillins.    [place]  reviewed    Discharge Medications:   Jefry Sabillon   Home Medication Instructions STEFANIE:[id number]    Printed on:03/16/18 1123   Medication Information                      allopurinol (ZYLOPRIM) 300 MG tablet               aspirin 81 MG chewable tablet  Chew 81 mg daily.             calcium carbonate (CALCIUM 600) 600 MG tablet  Take 600 mg by mouth Daily.             colchicine 0.6 MG tablet  Take 1 tablet by mouth 2 (Two) Times a Day for 15 days.             doxazosin (CARDURA) 4 MG tablet  Take 1 tablet by mouth Every Night.             enoxaparin (LOVENOX) 60 MG/0.6ML solution syringe  Inject 0.3 mL under the skin Daily.             Krill Oil 500 MG  capsule  Take 500 mg by mouth.             Loratadine (CLARITIN) 10 MG capsule  Take 10 mg by mouth.             Magnesium 300 MG capsule  Take 300 mg by mouth.             metFORMIN XR (GLUCOPHAGE-XR) 750 MG 24 hr tablet               metoprolol succinate XL (TOPROL-XL) 25 MG 24 hr tablet  Take 1 tablet by mouth Daily.             naproxen sodium (ALEVE) 220 MG tablet  Take 220 mg by mouth 2 (two) times a day as needed for mild pain (1-3).             pantoprazole (PROTONIX) 40 MG EC tablet               valsartan-hydrochlorothiazide (DIOVAN-HCT) 320-25 MG per tablet  Take 1 tablet by mouth Daily.                   Discharge Diet:      Dietary Orders     Start     Ordered    03/15/18 4535  Diet Regular; Cardiac, Consistent Carbohydrate  Diet Effective Now     Question Answer Comment   Diet Texture / Consistency Regular    Common Modifiers Cardiac    Common Modifiers Consistent Carbohydrate        03/15/18 7303          Activity at Discharge:  As tolerated      Follow-up Appointments:  With my office as instructed    Test Results Pending at Discharge       Kash Simpson MD  03/16/18  4:55 PM

## 2018-03-17 RX ORDER — LIDOCAINE HYDROCHLORIDE 10 MG/ML
8 INJECTION, SOLUTION EPIDURAL; INFILTRATION; INTRACAUDAL; PERINEURAL
Status: COMPLETED | OUTPATIENT
Start: 2018-03-14 | End: 2018-03-14

## 2018-03-17 RX ORDER — TRIAMCINOLONE ACETONIDE 40 MG/ML
80 INJECTION, SUSPENSION INTRA-ARTICULAR; INTRAMUSCULAR
Status: COMPLETED | OUTPATIENT
Start: 2018-03-14 | End: 2018-03-14

## 2018-03-17 NOTE — NURSING NOTE
Case Management Discharge Note    Final Note: DISCHARGED HOME    Destination     No service coordination in this encounter.      Durable Medical Equipment     No service coordination in this encounter.      Dialysis/Infusion     No service coordination in this encounter.      Home Medical Care     No service coordination in this encounter.      Social Care     No service coordination in this encounter.             Final Discharge Disposition Code: 01 - home or self-care

## 2018-03-20 ENCOUNTER — TELEPHONE (OUTPATIENT)
Dept: CARDIOLOGY | Facility: CLINIC | Age: 68
End: 2018-03-20

## 2018-03-20 PROCEDURE — 93227 XTRNL ECG REC<48 HR R&I: CPT | Performed by: INTERNAL MEDICINE

## 2018-03-20 NOTE — TELEPHONE ENCOUNTER
----- Message from Korin Mayes MD sent at 3/20/2018  3:43 PM EDT -----  pls call and let him know that monitor is normal, no changes.

## 2018-04-11 ENCOUNTER — OFFICE VISIT (OUTPATIENT)
Dept: ORTHOPEDIC SURGERY | Facility: CLINIC | Age: 68
End: 2018-04-11

## 2018-04-11 DIAGNOSIS — M17.0 PRIMARY OSTEOARTHRITIS OF KNEES, BILATERAL: Primary | ICD-10-CM

## 2018-04-11 PROCEDURE — 99212 OFFICE O/P EST SF 10 MIN: CPT | Performed by: NURSE PRACTITIONER

## 2018-04-11 RX ORDER — CETIRIZINE HYDROCHLORIDE 10 MG/1
10 TABLET ORAL DAILY
COMMUNITY

## 2018-04-11 NOTE — PROGRESS NOTES
Subjective:     Patient ID: Jefry Sabillon is a 67 y.o. male.    Chief Complaint: follow-up primary osteoarthritis bilateral knees    History of Present Illness    Mr. Saibllon presents back to clinic for follow-up bilateral knees. He has been doing okay after corticosteroid injections however just began experiencing pain at right knee within the last 24 hours along with swelling. Denies that either knee is giving out on him, locking nor buckling. He was seen by this APRN last in August 2016 for last viscosupplement injections which he received significant relief from for several months. Reports over the last few months pain at bilateral knees has returned. Pain present over bilateral medial and lateral joint lines and patella. Increased pain with ascending and descending steps, changing positions such as from sitting to standing and attempting to walk. Denies that he feels as if the knees are unstable at this time. Denies presence of numbness and tingling radiating down bilateral lower extremities. Rates pain at 8 out of 10, aching in nature. Denies that he is yet ready to proceed with total joint replacement surgery but would like to discuss pain relief measures at this time. Denies all other concerns present at this time.      Social History     Occupational History   • Not on file.     Social History Main Topics   • Smoking status: Never Smoker   • Smokeless tobacco: Never Used   • Alcohol use Yes   • Drug use: No   • Sexual activity: Defer      Past Medical History:   Diagnosis Date   • Arthritis    • Asthma    • Cardiac disease    • Coronary artery disease    • Heart disease    • Hypertension    • Knee sprain    • Mini stroke    • EVE (obstructive sleep apnea)    • Rotator cuff syndrome      Past Surgical History:   Procedure Laterality Date   • HERNIA REPAIR  2007   • KNEE SURGERY  1979       Family History   Problem Relation Age of Onset   • Hypertension Father    • Heart disease Father          Review of Systems    Constitutional: Negative for chills, diaphoresis, fever and unexpected weight change.   HENT: Negative for hearing loss, nosebleeds, sore throat and tinnitus.    Eyes: Negative for pain and visual disturbance.   Respiratory: Negative for cough, shortness of breath and wheezing.    Cardiovascular: Negative for chest pain and palpitations.   Gastrointestinal: Negative for abdominal pain, diarrhea, nausea and vomiting.   Endocrine: Negative for cold intolerance, heat intolerance and polydipsia.   Genitourinary: Negative for difficulty urinating, dysuria and hematuria.   Musculoskeletal: Positive for arthralgias and joint swelling.   Skin: Negative for rash and wound.   Allergic/Immunologic: Negative for environmental allergies.   Neurological: Negative for dizziness, syncope and numbness.   Hematological: Does not bruise/bleed easily.   Psychiatric/Behavioral: Negative for dysphoric mood and sleep disturbance. The patient is not nervous/anxious.    All other systems reviewed and are negative.          Objective:  Physical Exam  General: No acute distress.  Eyes: conjunctiva clear; pupils equally round and reactive  ENT: external ears and nose atraumatic; oropharynx clear  CV: no peripheral edema  Resp: normal respiratory effort  Skin: no rashes or wounds; normal turgor  Psych: mood and affect appropriate; recent and remote memory intact    There were no vitals filed for this visit.  There were no vitals filed for this visit.  There is no height or weight on file to calculate BMI.     Ortho Exam      Right Knee Exam      Tenderness   The patient is experiencing tenderness in the lateral joint line, medial joint line and patella.     Range of Motion   Extension: 0   Flexion: 120      Tests   Keith:  Medial - negative Lateral - negative  Lachman:  Anterior - 1+    Posterior - negative  Drawer:       Anterior - negative    Posterior - negative  Varus: negative  Valgus: negative     Other   Erythema: absent  Scars:  absent  Sensation: normal  Pulse: present  Swelling: mild  Other tests: no effusion present     Comments:  Crepitus throughout arc of motion     Left Knee Exam      Tenderness   The patient is experiencing tenderness in the lateral joint line, medial joint line and patella.     Range of Motion   Extension: 0   Flexion: 120      Tests   Keith:  Medial - negative Lateral - negative  Lachman:  Anterior - 1+    Posterior - negative  Drawer:       Anterior - negative     Posterior - negative  Varus: negative  Valgus: negative     Other   Erythema: absent  Scars: absent  Sensation: normal  Pulse: present  Swelling: mild  Effusion: no effusion present     Comments:  Crepitus throughout arc of motion    Assessment:       1. Primary osteoarthritis of knees, bilateral          Plan:  1. Discussed plan of care with patient. Wishes to proceed with viscosupplement injections at bilateral knees. Will plan to see him back after authorization approved for viscousupplement injections bilateral knees. Patient verbalized understanding of all information and agrees with plan of care. Denies all other concerns present at this time.     Orders:  Orders Placed This Encounter   Procedures   • Visco Treatment     Dictated utilizing Dragon dictation

## 2018-05-14 ENCOUNTER — OFFICE VISIT (OUTPATIENT)
Dept: CARDIOLOGY | Facility: CLINIC | Age: 68
End: 2018-05-14

## 2018-05-14 VITALS
BODY MASS INDEX: 37.8 KG/M2 | SYSTOLIC BLOOD PRESSURE: 132 MMHG | HEIGHT: 72 IN | HEART RATE: 75 BPM | WEIGHT: 279.1 LBS | DIASTOLIC BLOOD PRESSURE: 80 MMHG

## 2018-05-14 DIAGNOSIS — I10 ESSENTIAL HYPERTENSION: ICD-10-CM

## 2018-05-14 DIAGNOSIS — I31.9 PERICARDITIS, UNSPECIFIED CHRONICITY, UNSPECIFIED TYPE: Primary | ICD-10-CM

## 2018-05-14 DIAGNOSIS — E78.2 MIXED HYPERLIPIDEMIA: ICD-10-CM

## 2018-05-14 PROCEDURE — 93000 ELECTROCARDIOGRAM COMPLETE: CPT | Performed by: INTERNAL MEDICINE

## 2018-05-14 PROCEDURE — 99214 OFFICE O/P EST MOD 30 MIN: CPT | Performed by: INTERNAL MEDICINE

## 2018-05-14 NOTE — PROGRESS NOTES
Date of Office Visit: 2018  Encounter Provider: Korin Mayes MD  Place of Service: [facility] CARDIOLOGY  Patient Name: Jefry Sabillon  :1950      Patient ID:  Jefry Sabillon is a 67 y.o. male is here for  followup for pericarditis.         History of Present Illness    He has a history of hypertension, hyperlipidemia and impaired fasting glucose.  He presented to the hospital with fatigue and chest pounding.  His heart rate was elevated at home.  He was short winded and so came to the emergency department.  On the way to the emergency department, he began having chest discomfort and pressure located in his back between shoulder blades as well as some left arm pain and left numbness.  He had a CTA of the chest done 3/16/18 which showed no evidence of pulmonary embolism mild coronary calcifications.  There was also a left thyroid nodule and trace amount of pericardial fluid.  He had a stress study done 3/16/18 which shows no evidence of ischemia.  An echocardiogram done 3/16/18 which showed mild concentric left ventricular hypertrophy with hyperdynamic left ventricle, ejection fraction greater than 70% and grade 2 diastolic dysfunction. There was no significant valve disease.   He had chest pressure going into his back and dyspnea which is worse with lying down and better with sitting up and forward.  this was consistent with pericarditis.  He does have obstructive sleep apnea and uses a CPAP machine for that.     He doesn't smoke.  He works at [employer].  He lives with his wife.  He doesn't drink significant amounts of alcohol.     There is no premature cardiovascular disease in his family.    The colchicine that was prescribed during his hospitalization cleared the chest pain within a day.  He's had no recurrence of this.  He doesn't feels heart racing or skipping.  He's had no dizziness or syncope.  His energy level is good.  He has no orthopnea, dyspnea,  edema.  He has no exertional chest tightness or pressure.        Past Medical History:   Diagnosis Date   • Arthritis    • Asthma    • Cardiac disease    • Coronary artery disease    • Heart disease    • Hypertension    • Knee sprain    • Mini stroke    • EVE (obstructive sleep apnea)    • Rotator cuff syndrome          Past Surgical History:   Procedure Laterality Date   • HERNIA REPAIR  2007   • KNEE SURGERY  1979       Current Outpatient Prescriptions on File Prior to Visit   Medication Sig Dispense Refill   • allopurinol (ZYLOPRIM) 300 MG tablet      • aspirin 81 MG chewable tablet Chew 81 mg daily.     • calcium carbonate (CALCIUM 600) 600 MG tablet Take 600 mg by mouth Daily.     • cetirizine (zyrTEC) 10 MG tablet Take 10 mg by mouth Daily.     • Cholecalciferol (VITAMIN D3 PO) Take  by mouth. 3600     • doxazosin (CARDURA) 4 MG tablet Take 1 tablet by mouth Every Night.     • Garlic 1000 MG capsule Take  by mouth.     • Krill Oil 500 MG capsule Take 500 mg by mouth.     • Loratadine (CLARITIN) 10 MG capsule Take 10 mg by mouth.     • Magnesium 300 MG capsule Take 160 mg by mouth.     • metFORMIN XR (GLUCOPHAGE-XR) 750 MG 24 hr tablet      • metoprolol succinate XL (TOPROL-XL) 25 MG 24 hr tablet Take 1 tablet by mouth Daily. 30 tablet 2   • naproxen sodium (ALEVE) 220 MG tablet Take 220 mg by mouth 2 (two) times a day as needed for mild pain (1-3).     • pantoprazole (PROTONIX) 40 MG EC tablet      • valsartan-hydrochlorothiazide (DIOVAN-HCT) 320-25 MG per tablet Take 1 tablet by mouth Daily.     • [DISCONTINUED] Calcium Carb-Cholecalciferol (CALCIUM 1000 + D PO) Take  by mouth.       No current facility-administered medications on file prior to visit.        Social History     Social History   • Marital status:      Spouse name: N/A   • Number of children: N/A   • Years of education: N/A     Occupational History   • Not on file.     Social History Main Topics   • Smoking status: Never Smoker   •  "Smokeless tobacco: Never Used      Comment: caffeine use   • Alcohol use Yes   • Drug use: No   • Sexual activity: Defer     Other Topics Concern   • Not on file     Social History Narrative   • No narrative on file           Review of Systems   Constitution: Negative.   HENT: Negative for congestion.    Eyes: Negative for vision loss in left eye and vision loss in right eye.   Cardiovascular: Positive for dyspnea on exertion.   Respiratory: Negative.  Negative for cough, hemoptysis, shortness of breath, sleep disturbances due to breathing, snoring, sputum production and wheezing.    Endocrine: Negative.    Hematologic/Lymphatic: Negative.    Skin: Negative for poor wound healing and rash.   Musculoskeletal: Positive for joint pain and joint swelling. Negative for falls, gout, muscle cramps and myalgias.   Gastrointestinal: Negative for abdominal pain, diarrhea, dysphagia, hematemesis, melena, nausea and vomiting.   Neurological: Negative for excessive daytime sleepiness, dizziness, headaches, light-headedness, loss of balance, seizures and vertigo.   Psychiatric/Behavioral: Negative for depression and substance abuse. The patient is not nervous/anxious.        Procedures    ECG 12 Lead  Date/Time: 5/14/2018 1:57 PM  Performed by: SRINIVASA CLARK  Authorized by: SRINIVASA CLARK   Comparison: compared with previous ECG   Similar to previous ECG  Rhythm: sinus rhythm  Conduction: right bundle branch block  Clinical impression: abnormal ECG                Objective:      Vitals:    05/14/18 1349   BP: 132/80   BP Location: Right arm   Patient Position: Sitting   Pulse: 75   Weight: 127 kg (279 lb 1.6 oz)   Height: 182.9 cm (72\")     Body mass index is 37.85 kg/m².    Physical Exam   Constitutional: He is oriented to person, place, and time. He appears well-developed and well-nourished. No distress.   HENT:   Head: Normocephalic and atraumatic.   Eyes: Conjunctivae are normal. No scleral icterus.   Neck: Neck " supple. No JVD present. Carotid bruit is not present. No thyromegaly present.   Cardiovascular: Normal rate, regular rhythm, S1 normal, S2 normal, normal heart sounds and intact distal pulses.   No extrasystoles are present. PMI is not displaced.  Exam reveals no gallop.    No murmur heard.  Pulses:       Carotid pulses are 2+ on the right side, and 2+ on the left side.       Radial pulses are 2+ on the right side, and 2+ on the left side.        Dorsalis pedis pulses are 2+ on the right side, and 2+ on the left side.        Posterior tibial pulses are 2+ on the right side, and 2+ on the left side.   Pulmonary/Chest: Effort normal and breath sounds normal. No respiratory distress. He has no wheezes. He has no rhonchi. He has no rales. He exhibits no tenderness.   Abdominal: Soft. Bowel sounds are normal. He exhibits no distension, no abdominal bruit and no mass. There is no tenderness.   Musculoskeletal: He exhibits no edema or deformity.   Lymphadenopathy:     He has no cervical adenopathy.   Neurological: He is alert and oriented to person, place, and time. No cranial nerve deficit.   Skin: Skin is warm and dry. No rash noted. He is not diaphoretic. No cyanosis. No pallor. Nails show no clubbing.   Psychiatric: He has a normal mood and affect. Judgment normal.   Vitals reviewed.      Lab Review:       Assessment:      Diagnosis Plan   1. Pericarditis, unspecified chronicity, unspecified type     2. Essential hypertension     3. Mixed hyperlipidemia       1. Pericariditis, better after colchicine.   2. Hypertension, well controlled.   3. Diastolic dysfunction.    4. Hyperlipidemia.   5. Obesity.   6. EVE on CPAP.   7. Thyroid nodule, per Dr. Simpson.   8. Gout.      Plan:       See back in 1 year, no changes.

## 2018-07-19 ENCOUNTER — TELEPHONE (OUTPATIENT)
Dept: ORTHOPEDIC SURGERY | Facility: CLINIC | Age: 68
End: 2018-07-19

## 2018-07-19 NOTE — TELEPHONE ENCOUNTER
Received orthovisc injections in the mail today. Attempted to call and schedule patient. No answer. Left message to call back and schedule with araceli.

## 2018-07-25 ENCOUNTER — CLINICAL SUPPORT (OUTPATIENT)
Dept: ORTHOPEDIC SURGERY | Facility: CLINIC | Age: 68
End: 2018-07-25

## 2018-07-25 DIAGNOSIS — M17.0 PRIMARY OSTEOARTHRITIS OF KNEES, BILATERAL: Primary | ICD-10-CM

## 2018-07-25 PROCEDURE — 20610 DRAIN/INJ JOINT/BURSA W/O US: CPT | Performed by: NURSE PRACTITIONER

## 2018-07-25 NOTE — PROGRESS NOTES
Large Joint Arthrocentesis  Date/Time: 7/25/2018 10:37 AM  Consent given by: patient  Site marked: site marked  Timeout: Immediately prior to procedure a time out was called to verify the correct patient, procedure, equipment, support staff and site/side marked as required   Supporting Documentation  Indications: pain   Procedure Details  Location: knee - Knee joint: bilateral.  Preparation: Patient was prepped and draped in the usual sterile fashion  Needle size: 22 G  Approach: anterolateral  Medications administered: 30 mg Hyaluronan 30 MG/2ML  Patient tolerance: patient tolerated the procedure well with no immediate complications        Patient presented today for viscosupplement injection.  This is the first injection for the bilateral knees.  We will see patient back in one week.  We reviewed risks benefits and alternatives of the procedure and patient wished to proceed today and had all questions answered.

## 2018-08-01 ENCOUNTER — CLINICAL SUPPORT (OUTPATIENT)
Dept: ORTHOPEDIC SURGERY | Facility: CLINIC | Age: 68
End: 2018-08-01

## 2018-08-01 VITALS — BODY MASS INDEX: 37.93 KG/M2 | HEIGHT: 72 IN | WEIGHT: 280 LBS

## 2018-08-01 DIAGNOSIS — M17.0 PRIMARY OSTEOARTHRITIS OF KNEES, BILATERAL: ICD-10-CM

## 2018-08-01 DIAGNOSIS — R52 PAIN: Primary | ICD-10-CM

## 2018-08-01 PROCEDURE — 20610 DRAIN/INJ JOINT/BURSA W/O US: CPT | Performed by: NURSE PRACTITIONER

## 2018-08-01 NOTE — PROGRESS NOTES
Subjective:     Patient ID: Jefry Sabillon is a 67 y.o. male.    Chief Complaint:    History of Present Illness         Social History     Occupational History   • Not on file.     Social History Main Topics   • Smoking status: Never Smoker   • Smokeless tobacco: Never Used      Comment: caffeine use   • Alcohol use Yes   • Drug use: No   • Sexual activity: Defer      Past Medical History:   Diagnosis Date   • Arthritis    • Asthma    • Cardiac disease    • Coronary artery disease    • Heart disease    • Hypertension    • Knee sprain    • Mini stroke (CMS/HCC)    • EVE (obstructive sleep apnea)    • Rotator cuff syndrome      Past Surgical History:   Procedure Laterality Date   • HERNIA REPAIR  2007   • KNEE SURGERY  1979       Family History   Problem Relation Age of Onset   • Hypertension Father    • Heart disease Father    • Stroke Father          Review of Systems   Constitutional: Negative for chills, diaphoresis, fever and unexpected weight change.   HENT: Negative for hearing loss, nosebleeds, sore throat and tinnitus.    Eyes: Negative for pain and visual disturbance.   Respiratory: Negative for cough, shortness of breath and wheezing.    Cardiovascular: Negative for chest pain and palpitations.   Gastrointestinal: Negative for abdominal pain, diarrhea, nausea and vomiting.   Endocrine: Negative for cold intolerance, heat intolerance and polydipsia.   Genitourinary: Negative for difficulty urinating, dysuria and hematuria.   Musculoskeletal: Positive for arthralgias and myalgias. Negative for joint swelling.   Skin: Negative for rash and wound.   Allergic/Immunologic: Negative for environmental allergies.   Neurological: Negative for dizziness, syncope and numbness.   Hematological: Does not bruise/bleed easily.   Psychiatric/Behavioral: Negative for dysphoric mood and sleep disturbance. The patient is not nervous/anxious.            Objective:  Vitals:    08/01/18 1049   Weight: 127 kg (280 lb)   Height:  "182.9 cm (72\")     1    08/01/18  1049   Weight: 127 kg (280 lb)     Body mass index is 37.97 kg/m².     Ortho Exam       No diagnosis found.      Plan:  Large Joint Arthrocentesis  Date/Time: 8/1/2018 10:53 AM  Consent given by: patient  Site marked: site marked  Timeout: Immediately prior to procedure a time out was called to verify the correct patient, procedure, equipment, support staff and site/side marked as required   Supporting Documentation  Indications: pain   Procedure Details  Location: knee (bilateral) -   Preparation: Patient was prepped and draped in the usual sterile fashion  Needle size: 22 G  Medications administered: 30 mg Hyaluronan 30 MG/2ML  Patient tolerance: patient tolerated the procedure well with no immediate complications      Patient presented today for viscosupplement injection.  This is the second injection for the bilateral knees.  We will see patient back in one week.  We reviewed risks benefits and alternatives of the procedure and patient wished to proceed today and had all questions answered.      Orders:  No orders of the defined types were placed in this encounter.    Dictated utilizing Dragon dictation   "

## 2018-08-08 ENCOUNTER — CLINICAL SUPPORT (OUTPATIENT)
Dept: ORTHOPEDIC SURGERY | Facility: CLINIC | Age: 68
End: 2018-08-08

## 2018-08-08 ENCOUNTER — TELEPHONE (OUTPATIENT)
Dept: ORTHOPEDIC SURGERY | Facility: CLINIC | Age: 68
End: 2018-08-08

## 2018-08-08 DIAGNOSIS — M17.0 PRIMARY OSTEOARTHRITIS OF KNEES, BILATERAL: Primary | ICD-10-CM

## 2018-08-08 DIAGNOSIS — R52 PAIN: Primary | ICD-10-CM

## 2018-08-08 DIAGNOSIS — M17.0 PRIMARY OSTEOARTHRITIS OF KNEES, BILATERAL: ICD-10-CM

## 2018-08-08 PROCEDURE — 20610 DRAIN/INJ JOINT/BURSA W/O US: CPT | Performed by: NURSE PRACTITIONER

## 2018-08-08 NOTE — PROGRESS NOTES
Procedure   Large Joint Arthrocentesis  Date/Time: 8/8/2018 10:35 AM  Consent given by: patient  Site marked: site marked  Supporting Documentation  Indications: pain   Procedure Details  Location: knee - Knee joint: BILATERAL.  Preparation: Patient was prepped and draped in the usual sterile fashion  Needle size: 22 G  Approach: anterolateral  Medications administered: 30 mg Hyaluronan 30 MG/2ML  Patient tolerance: patient tolerated the procedure well with no immediate complications          Patient presented today for viscosupplement injection.  This is the third injection for the bilateral knees.  We will see patient back in four weeks.  We reviewed risks benefits and alternatives of the procedure and patient wished to proceed today and had all questions answered.

## 2018-10-25 ENCOUNTER — OFFICE VISIT (OUTPATIENT)
Dept: ORTHOPEDIC SURGERY | Facility: CLINIC | Age: 68
End: 2018-10-25

## 2018-10-25 DIAGNOSIS — M17.0 PRIMARY OSTEOARTHRITIS OF KNEES, BILATERAL: Primary | ICD-10-CM

## 2018-10-25 PROCEDURE — 99212 OFFICE O/P EST SF 10 MIN: CPT | Performed by: NURSE PRACTITIONER

## 2018-10-25 PROCEDURE — 20610 DRAIN/INJ JOINT/BURSA W/O US: CPT | Performed by: NURSE PRACTITIONER

## 2018-10-25 RX ORDER — FENOFIBRATE 145 MG/1
TABLET, COATED ORAL DAILY
COMMUNITY
Start: 2018-10-23

## 2018-10-25 RX ADMIN — TRIAMCINOLONE ACETONIDE 80 MG: 40 INJECTION, SUSPENSION INTRA-ARTICULAR; INTRAMUSCULAR at 08:57

## 2018-10-25 RX ADMIN — LIDOCAINE HYDROCHLORIDE 4 ML: 10 INJECTION, SOLUTION EPIDURAL; INFILTRATION; INTRACAUDAL; PERINEURAL at 08:57

## 2018-10-25 NOTE — PROGRESS NOTES
Subjective:     Patient ID: Jefry Sabillon is a 68 y.o. male.    Chief Complaint:  Follow-up primary osteoarthritis bilateral knees  History of Present Illness  Jefry Sabillon presents back to clinic follow-up bilateral knee pain. Received third and final visocsupplment injection bilateral knees on 08/08/2018 and was pleased with symptom relief achieved with injections however has noticed a slight increase of pain and swelling right knee over the last one week. Rates pain at 5 out of 10, mainly aching in nature, greatest over medial joint line and anterior aspect right knee. Denies right knee locking, giving away or buckling on him. Increased pain noted with seated and knee flexed for extended periods of time. Just returned from trip with family to [place] which included prolonged periods of driving, sitting along with standing and walking. Denies presence of numbness and tingling bilateral lower extremities. Denies all other concerns present at this time.        Social History     Occupational History   • Not on file.     Social History Main Topics   • Smoking status: Never Smoker   • Smokeless tobacco: Never Used      Comment: caffeine use   • Alcohol use Yes   • Drug use: No   • Sexual activity: Defer      Past Medical History:   Diagnosis Date   • Arthritis    • Asthma    • Cardiac disease    • Coronary artery disease    • Heart disease    • Hypertension    • Knee sprain    • Mini stroke (CMS/HCC)    • EVE (obstructive sleep apnea)    • Rotator cuff syndrome      Past Surgical History:   Procedure Laterality Date   • HERNIA REPAIR  2007   • KNEE SURGERY  1979       Family History   Problem Relation Age of Onset   • Hypertension Father    • Heart disease Father    • Stroke Father          Review of Systems   Constitutional: Negative for chills, diaphoresis, fever and unexpected weight change.   HENT: Negative for hearing loss, nosebleeds, sore throat and tinnitus.    Eyes: Negative for pain and visual disturbance.    Respiratory: Negative for cough, shortness of breath and wheezing.    Cardiovascular: Negative for chest pain and palpitations.   Gastrointestinal: Negative for abdominal pain, diarrhea, nausea and vomiting.   Endocrine: Negative for cold intolerance, heat intolerance and polydipsia.   Genitourinary: Negative for difficulty urinating, dysuria and hematuria.   Musculoskeletal: Positive for arthralgias. Negative for joint swelling and myalgias.   Skin: Negative for rash and wound.   Allergic/Immunologic: Negative for environmental allergies.   Neurological: Negative for dizziness, syncope and numbness.   Hematological: Does not bruise/bleed easily.   Psychiatric/Behavioral: Negative for dysphoric mood and sleep disturbance. The patient is not nervous/anxious.            Objective:  Physical Exam    General: No acute distress.  Eyes: conjunctiva clear; pupils equally round and reactive  ENT: external ears and nose atraumatic; oropharynx clear  CV: no peripheral edema  Resp: normal respiratory effort  Skin: no rashes or wounds; normal turgor  Psych: mood and affect appropriate; recent and remote memory intact    There were no vitals filed for this visit.  There were no vitals filed for this visit.  There is no height or weight on file to calculate BMI.      Ortho Exam     Right Knee Exam      Tenderness   The patient is experiencing tenderness in the lateral joint line, medial joint line and patella.     Range of Motion   Extension: 0   Flexion: 120      Tests   Keith:  Medial - negative Lateral - negative  Lachman:  Anterior - 1+    Posterior - negative  Drawer:       Anterior - negative    Posterior - negative  Varus: negative  Valgus: negative     Other   Erythema: absent  Scars: absent  Sensation: normal  Pulse: present  Swelling: mild  Other tests: no effusion present     Comments:  Crepitus throughout arc of motion     Left Knee Exam      Tenderness   The patient is experiencing tenderness in the lateral  joint line, medial joint line and patella.     Range of Motion   Extension: 0   Flexion: 120      Tests   Keith:  Medial - negative Lateral - negative  Lachman:  Anterior - 1+    Posterior - negative  Drawer:       Anterior - negative     Posterior - negative  Varus: negative  Valgus: negative     Other   Erythema: absent  Scars: absent  Sensation: normal  Pulse: present  Swelling: mild  Effusion: no effusion present     Comments:  Crepitus throughout arc of motion    Assessment:        1. Primary osteoarthritis of knees, bilateral           Plan:  1. Discussed plan of care with patient. Wishes to proceed with corticosteroid injection bilateral knees. Will plan to see him back in approximately 3 months, as needed. His plan is to undergo total knee replacement, right knee in future, approximately one year. Will see him back in about three months. Patient verbalized understanding of all information and agrees with plan of care. Denies all other concerns present at this time.     Large Joint Arthrocentesis  Date/Time: 10/25/2018 8:57 AM  Consent given by: patient  Site marked: site marked  Timeout: Immediately prior to procedure a time out was called to verify the correct patient, procedure, equipment, support staff and site/side marked as required   Supporting Documentation  Indications: pain   Procedure Details  Location: knee - Knee joint: bilateral.  Preparation: Patient was prepped and draped in the usual sterile fashion  Needle size: 22 G  Approach: anterolateral  Medications administered: 4 mL lidocaine PF 1% 1 %; 80 mg triamcinolone acetonide 40 MG/ML  Patient tolerance: patient tolerated the procedure well with no immediate complications            Orders:  Orders Placed This Encounter   Procedures   • Large Joint Arthrocentesis     Dictated utilizing Dragon dictation

## 2018-10-26 RX ORDER — LIDOCAINE HYDROCHLORIDE 10 MG/ML
4 INJECTION, SOLUTION EPIDURAL; INFILTRATION; INTRACAUDAL; PERINEURAL
Status: COMPLETED | OUTPATIENT
Start: 2018-10-25 | End: 2018-10-25

## 2018-10-26 RX ORDER — TRIAMCINOLONE ACETONIDE 40 MG/ML
80 INJECTION, SUSPENSION INTRA-ARTICULAR; INTRAMUSCULAR
Status: COMPLETED | OUTPATIENT
Start: 2018-10-25 | End: 2018-10-25

## 2019-02-11 ENCOUNTER — OFFICE VISIT (OUTPATIENT)
Dept: ORTHOPEDIC SURGERY | Facility: CLINIC | Age: 69
End: 2019-02-11

## 2019-02-11 VITALS — WEIGHT: 292 LBS | BODY MASS INDEX: 39.6 KG/M2

## 2019-02-11 DIAGNOSIS — M17.0 PRIMARY OSTEOARTHRITIS OF KNEES, BILATERAL: Primary | ICD-10-CM

## 2019-02-11 PROCEDURE — 99212 OFFICE O/P EST SF 10 MIN: CPT | Performed by: NURSE PRACTITIONER

## 2019-02-11 PROCEDURE — 20610 DRAIN/INJ JOINT/BURSA W/O US: CPT | Performed by: NURSE PRACTITIONER

## 2019-02-11 RX ORDER — FUROSEMIDE 20 MG/1
TABLET ORAL
COMMUNITY
Start: 2019-01-17 | End: 2019-06-18

## 2019-02-11 RX ADMIN — TRIAMCINOLONE ACETONIDE 80 MG: 40 INJECTION, SUSPENSION INTRA-ARTICULAR; INTRAMUSCULAR at 11:14

## 2019-02-11 RX ADMIN — LIDOCAINE HYDROCHLORIDE 8 ML: 10 INJECTION, SOLUTION EPIDURAL; INFILTRATION; INTRACAUDAL; PERINEURAL at 11:14

## 2019-02-11 NOTE — PROGRESS NOTES
Procedure   Large Joint Arthrocentesis  Date/Time: 2/11/2019 11:14 AM  Consent given by: patient  Site marked: site marked  Timeout: Immediately prior to procedure a time out was called to verify the correct patient, procedure, equipment, support staff and site/side marked as required   Supporting Documentation  Indications: pain   Procedure Details  Location: knee - Knee joint: BILATERAL KNEE.  Preparation: Patient was prepped and draped in the usual sterile fashion  Needle size: 22 G  Approach: superior (LATERAL)  Medications administered: 8 mL lidocaine PF 1% 1 %; 80 mg triamcinolone acetonide 40 MG/ML  Patient tolerance: patient tolerated the procedure well with no immediate complications

## 2019-02-11 NOTE — PROGRESS NOTES
Subjective:     Patient ID: Jefry Sabillon is a 68 y.o. male.    Chief Complaint:  Follow-up primary osteoarthritis bilateral knees  History of Present Illness  Jefry Sabillon presents back to clinic for follow-up bilateral knee pain which has returned over the last 2 weeks. Maximal tenderness present medial compartment and anterior aspect of bilateral knees, right knee pain greater than left. He was last seen by this nurse practitioner October 2018 and received significant symptom relief for 3 months. He was pleased with the relief, more so than with previous viscosupplement injections. Rates discomfort at 7 out of 10, aching in nature. Increased pain noted with ambulating long distances, deep flexion, standing for long periods of time and transitional changes such as seated to standing. Positive for swelling right knee. Denies presence of locking, catching, giving away. He is not experiencing numbness or tingling at either lower extremity. Denies all other concerns present at this time.    Social History     Occupational History   • Not on file   Tobacco Use   • Smoking status: Never Smoker   • Smokeless tobacco: Never Used   • Tobacco comment: caffeine use   Substance and Sexual Activity   • Alcohol use: Yes   • Drug use: No   • Sexual activity: Defer      Past Medical History:   Diagnosis Date   • Arthritis    • Asthma    • Cardiac disease    • Coronary artery disease    • Heart disease    • Hypertension    • Knee sprain    • Mini stroke (CMS/[facility])    • EVE (obstructive sleep apnea)    • Rotator cuff syndrome      Past Surgical History:   Procedure Laterality Date   • HERNIA REPAIR  2007   • KNEE SURGERY  1979       Family History   Problem Relation Age of Onset   • Hypertension Father    • Heart disease Father    • Stroke Father          Review of Systems   Constitutional: Negative for chills, diaphoresis, fever and unexpected weight change.   HENT: Negative for hearing loss, nosebleeds, sore throat and tinnitus.     Eyes: Negative for pain and visual disturbance.   Respiratory: Negative for cough, shortness of breath and wheezing.    Cardiovascular: Negative for chest pain and palpitations.   Gastrointestinal: Negative for abdominal pain, diarrhea, nausea and vomiting.   Endocrine: Negative for cold intolerance, heat intolerance and polydipsia.   Genitourinary: Negative for difficulty urinating, dysuria and hematuria.   Musculoskeletal: Positive for arthralgias.   Skin: Negative for rash and wound.   Allergic/Immunologic: Negative for environmental allergies.   Neurological: Negative for dizziness, syncope and numbness.   Hematological: Does not bruise/bleed easily.   Psychiatric/Behavioral: Negative for dysphoric mood and sleep disturbance. The patient is not nervous/anxious.    All other systems reviewed and are negative.          Objective:  Physical Exam    General: No acute distress.  Eyes: conjunctiva clear; pupils equally round and reactive  ENT: external ears and nose atraumatic; oropharynx clear  CV: no peripheral edema  Resp: normal respiratory effort  Skin: no rashes or wounds; normal turgor  Psych: mood and affect appropriate; recent and remote memory intact    Vitals:    02/11/19 1046   Weight: 132 kg (292 lb)         02/11/19  1046   Weight: 132 kg (292 lb)     Body mass index is 39.6 kg/m².           Exam:  Range of motion 0 degrees-120 degrees  Stable to varus and valgus stress at 0 degrees and 30 degrees Moderate swelling right knee Mild swelling left knee Maximal tenderness medial compartment and patella bilaterally Positive Lachman’s 1A Negative patellar apprehension test Negative active patellar compression test Positive sensation all distributions light touch Negative calf pain bilaterally    Assessment:        1. Primary osteoarthritis of knees, bilateral           Plan:  1. Discussed plan of care. Wishes to proceed with corticosteroid injection bilateral knees since he received significant symptom  relief in past. He is not yet ready to proceed with surgical intervention but would like to see Dr. Mejias in future when he is ready. Will plan to see him back in 3 months, as needed. Patient verbalized understanding of all information and agrees with plan of care. Denies all other concerns present at this time.    Orders:  Orders Placed This Encounter   Procedures   • Large Joint Arthrocentesis       Dictated utilizing Dragon dictation

## 2019-02-14 RX ORDER — TRIAMCINOLONE ACETONIDE 40 MG/ML
80 INJECTION, SUSPENSION INTRA-ARTICULAR; INTRAMUSCULAR
Status: COMPLETED | OUTPATIENT
Start: 2019-02-11 | End: 2019-02-11

## 2019-02-14 RX ORDER — LIDOCAINE HYDROCHLORIDE 10 MG/ML
8 INJECTION, SOLUTION EPIDURAL; INFILTRATION; INTRACAUDAL; PERINEURAL
Status: COMPLETED | OUTPATIENT
Start: 2019-02-11 | End: 2019-02-11

## 2019-06-18 ENCOUNTER — OFFICE VISIT (OUTPATIENT)
Dept: ORTHOPEDIC SURGERY | Facility: CLINIC | Age: 69
End: 2019-06-18

## 2019-06-18 VITALS
HEART RATE: 66 BPM | WEIGHT: 280 LBS | BODY MASS INDEX: 37.93 KG/M2 | HEIGHT: 72 IN | SYSTOLIC BLOOD PRESSURE: 125 MMHG | DIASTOLIC BLOOD PRESSURE: 80 MMHG

## 2019-06-18 DIAGNOSIS — M17.0 PRIMARY OSTEOARTHRITIS OF KNEES, BILATERAL: Primary | ICD-10-CM

## 2019-06-18 PROCEDURE — 99204 OFFICE O/P NEW MOD 45 MIN: CPT | Performed by: ORTHOPAEDIC SURGERY

## 2019-06-18 PROCEDURE — 73562 X-RAY EXAM OF KNEE 3: CPT | Performed by: ORTHOPAEDIC SURGERY

## 2019-07-08 ENCOUNTER — PREP FOR SURGERY (OUTPATIENT)
Dept: OTHER | Facility: HOSPITAL | Age: 69
End: 2019-07-08

## 2019-07-08 DIAGNOSIS — M17.11 PRIMARY OSTEOARTHRITIS OF RIGHT KNEE: Primary | ICD-10-CM

## 2019-07-08 RX ORDER — PREGABALIN 75 MG/1
150 CAPSULE ORAL ONCE
Status: CANCELLED | OUTPATIENT
Start: 2019-07-08 | End: 2019-07-08

## 2019-07-08 RX ORDER — ACETAMINOPHEN 500 MG
1000 TABLET ORAL ONCE
Status: CANCELLED | OUTPATIENT
Start: 2019-07-08 | End: 2019-07-08

## 2019-07-08 RX ORDER — MELOXICAM 7.5 MG/1
15 TABLET ORAL ONCE
Status: CANCELLED | OUTPATIENT
Start: 2019-07-08 | End: 2019-07-08

## 2019-07-16 NOTE — PROGRESS NOTES
Subjective:     Encounter Date:07/17/2019      Patient ID: Jefry Sabillon is a 68 y.o. male.    Chief Complaint: annual follow up; cardiac risk assessment  History of Present Illness     He is a new patient to me and I have reviewed his past medical records.  He is a patient of Dr. Mayes.  He has a history of HTN, hyperlipidemia and impaired fasting glucose.    He presented to the emergency room on 3/16/2018 with chest discomfort and shortness of breath.  He had not been feeling very well for the past 24 hours.  He felt very tired, fatigued and his heart was pounding.  He had taken his blood pressure several times during the day and it was elevated with a heart rate of 110.  He went to bed thinking he would feel better in the morning.  However upon waking, he was still having problems with his heart pounding and he felt somewhat short of breath.  This progressively became worse and he presented to the emergency room.  Once in the ER his chest discomfort and chest pressure was mostly located in the back and between his shoulder blades.  He did have some left arm pain and numbness. His left arm became cold below the elbow.    He was admitted and monitored for further tachyarrhythmias.  His shortness of breath and chest pain were better when sitting up and leaning forward.  His symptoms were worse while lying down.  This was felt to be consistent with pericarditis.  His echocardiogram showed hyperdynamic left ventricle with ejection fraction 70% with no significant valvular problems.  His CT did not show any evidence of pulmonary embolism or pneumonia.  It did show some mild coronary calcifications.  It also showed a left thyroid nodule and a trace amount of pericardial fluid.  He had a Lexiscan Cardiolite which was negative.  Colchicine was prescribed during his hospitalization which cleared his chest pain within a day.    Had has no prior cardiac history except  multiple years ago, apparently had a chest  contusion from a third of the chest and had a cardiac catheter that time which was negative.     He does not smoke.  He works at [email or web address].  He lives with his wife.  He has a small amount of alcohol.    He present today, 7/17/19, for his annual cardiac evaluation and surgery risk assessment.  He is to have a total right knee replacement by Dr. Terry.  He denies any palpitations, shortness of breath or edema.  He denies any chest pain or chest tightness.  He said no episodes of lightheadedness or dizziness.  He denies any fatigue.  He does complain of right knee pain and has a brace in place.  He denies any unexplained bleeding.  He has EVE and uses his CPAP nightly.  He denies any PND, cough, orthopnea.  He is taking his medications as directed.    The following portions of the patient's history were reviewed and updated as appropriate: allergies, current medications, past family history, past medical history, past social history, past surgical history and problem list.    Review of Systems   Constitution: Negative for weakness and malaise/fatigue.   HENT: Negative for congestion, hoarse voice and sore throat.    Eyes: Negative for blurred vision, double vision, photophobia, vision loss in left eye and vision loss in right eye.   Cardiovascular: Negative for chest pain, dyspnea on exertion, irregular heartbeat, leg swelling, near-syncope, orthopnea, palpitations, paroxysmal nocturnal dyspnea and syncope.   Respiratory: Negative for cough, hemoptysis, shortness of breath, sleep disturbances due to breathing, snoring, sputum production and wheezing.    Endocrine: Negative.    Hematologic/Lymphatic: Does not bruise/bleed easily.   Skin: Negative for color change, dry skin, poor wound healing and rash.   Musculoskeletal: Negative for back pain, falls, gout,  joint swelling, muscle cramps and muscle weakness. Right knee pain  Gastrointestinal: Negative for abdominal pain, constipation, diarrhea, dysphagia, melena,  nausea and vomiting.   Neurological: Negative for excessive daytime sleepiness, dizziness, headaches, light-headedness, loss of balance, numbness, paresthesias, seizures and vertigo.   Psychiatric/Behavioral: Negative for depression and substance abuse. The patient is not nervous/anxious.        ECG 12 Lead  Date/Time: 7/17/2019 10:44 AM  Performed by: Maria L Medrano APRN  Authorized by: Maria L Medrano APRN   Comparison: compared with previous ECG from 5/14/2018  Similar to previous ECG  Rhythm: sinus rhythm  Rate: normal  Conduction: right bundle branch block  ST Segments: ST segments normal  T Waves: T waves normal  QRS axis: normal    Clinical impression: abnormal EKG               Objective:         Physical Exam   Constitutional: He is oriented to person, place, and time. Vital signs are normal. He appears well-developed and well-nourished. No distress.   HENT:   Head: Normocephalic and atraumatic.   Right Ear: Hearing normal.   Left Ear: Hearing normal.   Eyes: Conjunctivae and lids are normal.   Neck: Normal range of motion. Neck supple. No JVD present. Carotid bruit is not present. No thyromegaly present.   Cardiovascular: Normal rate, regular rhythm, S1 normal, S2 normal, normal heart sounds and intact distal pulses.  PMI is not displaced.  Exam reveals no gallop.    No murmur heard.  Pulses:       Carotid pulses are 2+ on the right side, and 2+ on the left side.       Radial pulses are 2+ on the right side, and 2+ on the left side.        Dorsalis pedis pulses are 2+ on the right side, and 2+ on the left side.        Posterior tibial pulses are 2+ on the right side, and 2+ on the left side.   Pulmonary/Chest: Effort normal and breath sounds normal. No respiratory distress. He has no wheezes. He has no rhonchi. He has no rales. He exhibits no tenderness.   Abdominal: Soft. Normal appearance and bowel sounds are normal. He exhibits no distension, no abdominal bruit and no mass. There is no tenderness.  "  Musculoskeletal: Normal range of motion.   Exhibits no edema or deformity   Lymphadenopathy:     He has no cervical adenopathy.   Neurological: He is alert and oriented to person, place, and time. No cranial nerve deficit. Coordination and gait normal.   Oriented to person, place and time.   Skin: Skin is warm, dry and intact. No rash noted. He is not diaphoretic. No cyanosis. Nails show no clubbing.   Psychiatric: He has a normal mood and affect. His speech is normal and behavior is normal. Judgment and thought content normal. Cognition and memory are normal.     Vitals:    07/17/19 1036 07/17/19 1044   BP: 132/90 130/86   BP Location: Right arm    Patient Position: Sitting    Pulse: 67    Weight: 131 kg (289 lb 6.4 oz)    Height: 182.9 cm (72.01\")            Lab Review:       Assessment:          Diagnosis Plan   1. Preop cardiovascular exam     2. Essential hypertension     3. Mixed hyperlipidemia     4. Primary osteoarthritis of right knee     5. Pericarditis, unspecified chronicity, unspecified type            Plan:     1.  Preop cardiovascular exam - Stress test 3/15/18 - Left ventricular ejection fraction is normal (Calculated EF = 70%).  Myocardial perfusion imaging indicates a normal myocardial perfusion study with no evidence of ischemia. Normal Holter 3/15/18.  Echo 3/16/18 Left ventricular wall thickness is consistent with mild concentric hypertrophy. Left ventricular systolic function is hyperdynamic (EF > 70). Left ventricular diastolic dysfunction (grade II) consistent with pseudonormalization    2.  Essential HTN - mildly elevated at visit today.  Continue current regimen  3.  Mixed Hyperlipidemia - continue lipid lowering therapy  4.  Primary osteoarthritis of right knee - scheduled for TRK replacement 9/4/19 with Dr. Mejias  5.  History of pericarditis - 3/18. No recurrence  6.  Obesity - he would benefit from a weight loss program.    Per revised cardiac risk Index (Domenico Criteria) - his estimated " risk of adverse outcome with noncardiac surgery is a very low risk.  His estimated right of myocardial infarction, pulmonary edema, ventricular fibrillation, cardiac arrest, or complete heart block is 0.4%.    RTO 1 year with RM    RASHAD Alegria      Current Outpatient Medications:   •  allopurinol (ZYLOPRIM) 300 MG tablet, , Disp: , Rfl:   •  aspirin 81 MG chewable tablet, Chew 81 mg daily., Disp: , Rfl:   •  cetirizine (zyrTEC) 10 MG tablet, Take 10 mg by mouth Daily., Disp: , Rfl:   •  doxazosin (CARDURA) 4 MG tablet, Take 1 tablet by mouth Every Night., Disp: , Rfl:   •  fenofibrate (TRICOR) 145 MG tablet, , Disp: , Rfl:   •  metFORMIN XR (GLUCOPHAGE-XR) 750 MG 24 hr tablet, , Disp: , Rfl:   •  metoprolol succinate XL (TOPROL-XL) 25 MG 24 hr tablet, Take 1 tablet by mouth Daily., Disp: 30 tablet, Rfl: 2  •  naproxen sodium (ALEVE) 220 MG tablet, Take 220 mg by mouth 2 (two) times a day as needed for mild pain (1-3)., Disp: , Rfl:   •  pantoprazole (PROTONIX) 40 MG EC tablet, , Disp: , Rfl:   •  valsartan-hydrochlorothiazide (DIOVAN-HCT) 320-25 MG per tablet, Take 1 tablet by mouth Daily., Disp: , Rfl:

## 2019-07-17 ENCOUNTER — OFFICE VISIT (OUTPATIENT)
Dept: CARDIOLOGY | Facility: CLINIC | Age: 69
End: 2019-07-17

## 2019-07-17 VITALS
WEIGHT: 289.4 LBS | HEART RATE: 67 BPM | BODY MASS INDEX: 39.2 KG/M2 | HEIGHT: 72 IN | SYSTOLIC BLOOD PRESSURE: 130 MMHG | DIASTOLIC BLOOD PRESSURE: 86 MMHG

## 2019-07-17 DIAGNOSIS — E78.2 MIXED HYPERLIPIDEMIA: ICD-10-CM

## 2019-07-17 DIAGNOSIS — M17.11 PRIMARY OSTEOARTHRITIS OF RIGHT KNEE: ICD-10-CM

## 2019-07-17 DIAGNOSIS — Z01.810 PREOP CARDIOVASCULAR EXAM: ICD-10-CM

## 2019-07-17 DIAGNOSIS — I10 ESSENTIAL HYPERTENSION: ICD-10-CM

## 2019-07-17 DIAGNOSIS — I31.9 PERICARDITIS, UNSPECIFIED CHRONICITY, UNSPECIFIED TYPE: ICD-10-CM

## 2019-07-17 PROCEDURE — 99214 OFFICE O/P EST MOD 30 MIN: CPT | Performed by: NURSE PRACTITIONER

## 2019-07-17 PROCEDURE — 93000 ELECTROCARDIOGRAM COMPLETE: CPT | Performed by: NURSE PRACTITIONER

## 2019-08-20 ENCOUNTER — PREP FOR SURGERY (OUTPATIENT)
Dept: OTHER | Facility: HOSPITAL | Age: 69
End: 2019-08-20

## 2019-08-20 ENCOUNTER — APPOINTMENT (OUTPATIENT)
Dept: PREADMISSION TESTING | Facility: HOSPITAL | Age: 69
End: 2019-08-20

## 2019-08-20 VITALS
BODY MASS INDEX: 38.87 KG/M2 | HEART RATE: 69 BPM | OXYGEN SATURATION: 95 % | SYSTOLIC BLOOD PRESSURE: 158 MMHG | HEIGHT: 72 IN | WEIGHT: 287 LBS | RESPIRATION RATE: 16 BRPM | DIASTOLIC BLOOD PRESSURE: 86 MMHG

## 2019-08-20 DIAGNOSIS — M17.11 PRIMARY OSTEOARTHRITIS OF RIGHT KNEE: Primary | ICD-10-CM

## 2019-08-20 DIAGNOSIS — M17.11 PRIMARY OSTEOARTHRITIS OF RIGHT KNEE: ICD-10-CM

## 2019-08-20 LAB
ANION GAP SERPL CALCULATED.3IONS-SCNC: 14.2 MMOL/L (ref 5–15)
APTT PPP: 28.4 SECONDS (ref 24.3–38.1)
BASOPHILS # BLD AUTO: 0.04 10*3/MM3 (ref 0–0.2)
BASOPHILS NFR BLD AUTO: 0.6 % (ref 0–1.5)
BILIRUB UR QL STRIP: NEGATIVE
BUN BLD-MCNC: 21 MG/DL (ref 8–23)
BUN/CREAT SERPL: 15.6 (ref 7–25)
CALCIUM SPEC-SCNC: 9.8 MG/DL (ref 8.6–10.5)
CHLORIDE SERPL-SCNC: 102 MMOL/L (ref 98–107)
CLARITY UR: CLEAR
CO2 SERPL-SCNC: 26.8 MMOL/L (ref 22–29)
COLOR UR: YELLOW
CREAT BLD-MCNC: 1.35 MG/DL (ref 0.76–1.27)
DEPRECATED RDW RBC AUTO: 47.8 FL (ref 37–54)
EOSINOPHIL # BLD AUTO: 0.13 10*3/MM3 (ref 0–0.4)
EOSINOPHIL NFR BLD AUTO: 1.9 % (ref 0.3–6.2)
ERYTHROCYTE [DISTWIDTH] IN BLOOD BY AUTOMATED COUNT: 14.7 % (ref 12.3–15.4)
GFR SERPL CREATININE-BSD FRML MDRD: 53 ML/MIN/1.73
GLUCOSE BLD-MCNC: 111 MG/DL (ref 65–99)
GLUCOSE UR STRIP-MCNC: NEGATIVE MG/DL
HCT VFR BLD AUTO: 40.2 % (ref 37.5–51)
HGB BLD-MCNC: 13.3 G/DL (ref 13–17.7)
HGB UR QL STRIP.AUTO: NEGATIVE
IMM GRANULOCYTES # BLD AUTO: 0.04 10*3/MM3 (ref 0–0.05)
IMM GRANULOCYTES NFR BLD AUTO: 0.6 % (ref 0–0.5)
INR PPP: 1.03 (ref 0.9–1.1)
KETONES UR QL STRIP: ABNORMAL
LEUKOCYTE ESTERASE UR QL STRIP.AUTO: NEGATIVE
LYMPHOCYTES # BLD AUTO: 1.72 10*3/MM3 (ref 0.7–3.1)
LYMPHOCYTES NFR BLD AUTO: 24.9 % (ref 19.6–45.3)
MCH RBC QN AUTO: 29.2 PG (ref 26.6–33)
MCHC RBC AUTO-ENTMCNC: 33.1 G/DL (ref 31.5–35.7)
MCV RBC AUTO: 88.4 FL (ref 79–97)
MONOCYTES # BLD AUTO: 0.6 10*3/MM3 (ref 0.1–0.9)
MONOCYTES NFR BLD AUTO: 8.7 % (ref 5–12)
NEUTROPHILS # BLD AUTO: 4.39 10*3/MM3 (ref 1.7–7)
NEUTROPHILS NFR BLD AUTO: 63.3 % (ref 42.7–76)
NITRITE UR QL STRIP: NEGATIVE
NRBC BLD AUTO-RTO: 0 /100 WBC (ref 0–0.2)
PH UR STRIP.AUTO: 5.5 [PH] (ref 4.5–8)
PLATELET # BLD AUTO: 288 10*3/MM3 (ref 140–450)
PMV BLD AUTO: 10.4 FL (ref 6–12)
POTASSIUM BLD-SCNC: 4.8 MMOL/L (ref 3.5–5.2)
PROT UR QL STRIP: NEGATIVE
PROTHROMBIN TIME: 13.2 SECONDS (ref 12.1–15)
RBC # BLD AUTO: 4.55 10*6/MM3 (ref 4.14–5.8)
SODIUM BLD-SCNC: 143 MMOL/L (ref 136–145)
SP GR UR STRIP: 1.02 (ref 1–1.03)
UROBILINOGEN UR QL STRIP: ABNORMAL
WBC NRBC COR # BLD: 6.92 10*3/MM3 (ref 3.4–10.8)

## 2019-08-20 PROCEDURE — 36415 COLL VENOUS BLD VENIPUNCTURE: CPT

## 2019-08-20 PROCEDURE — 80048 BASIC METABOLIC PNL TOTAL CA: CPT | Performed by: ORTHOPAEDIC SURGERY

## 2019-08-20 PROCEDURE — 81003 URINALYSIS AUTO W/O SCOPE: CPT | Performed by: ORTHOPAEDIC SURGERY

## 2019-08-20 PROCEDURE — 85730 THROMBOPLASTIN TIME PARTIAL: CPT | Performed by: ORTHOPAEDIC SURGERY

## 2019-08-20 PROCEDURE — 85025 COMPLETE CBC W/AUTO DIFF WBC: CPT | Performed by: ORTHOPAEDIC SURGERY

## 2019-08-20 PROCEDURE — 85610 PROTHROMBIN TIME: CPT | Performed by: ORTHOPAEDIC SURGERY

## 2019-08-20 PROCEDURE — 87081 CULTURE SCREEN ONLY: CPT | Performed by: ORTHOPAEDIC SURGERY

## 2019-08-20 PROCEDURE — 83036 HEMOGLOBIN GLYCOSYLATED A1C: CPT | Performed by: ORTHOPAEDIC SURGERY

## 2019-08-20 RX ORDER — FUROSEMIDE 20 MG/1
20 TABLET ORAL DAILY PRN
COMMUNITY

## 2019-08-20 NOTE — DISCHARGE INSTRUCTIONS
PRE-ADMISSION TESTING INSTRUCTIONS FOR TOTAL JOINT PATIENTS    Take these medications the morning of surgery with a small sip of water: Metoprolol, Pantoprazole, Doxazosin      No aspirin, advil, aleve, ibuprofen, naproxen, diet pills, decongestants, or vitamin/herbal supplements for a week prior to your surgery.    Do not take any insulin or diabetes medications the morning of surgery.      Start your Bactroban ointment on _____8/30_____.  You will need to apply the ointment with a clean Q-tip 3 times a day in both sides of your nose for 5 days and the morning of surgery.    General Instructions:    • Do not eat solid food after midnight the night before surgery.  No gum, mints, or hard candy after midnight the night before surgery.  • You may drink clear liquids the day of surgery up until 2 hours before your arrival time.  • Clear liquids are liquids you can see through. Nothing RED in color.    Plain water    Sports drinks  Sodas     Gelatin (Jell-O)  Fruit juices without pulp such as white grape juice and apple juice  Popsicles that contain no fruit or yogurt  Tea or coffee (no cream or milk added)    • It is beneficial for you to have a clear drink that contains carbohydrates just before you leave your house and before your fasting time begins.  We suggest a 20 ounce bottle of Gatorade or Powerade for non-diabetic patients or a 20 ounce bottle of G2 or Powerade Zero for diabetic patients.     • Patients who avoid smoking, chewing tobacco and alcohol for 4 weeks prior to surgery have a reduced risk of post-operative complications.  If at all possible, quit smoking as many days before surgery as you can.    • Do not smoke, use chewing tobacco or drink alcohol after midnight the day of surgery.    • Bring your C-PAP/ BI-PAP machine if you use one.  • Wear clean comfortable clothes and socks.  • Do not wear contact lenses, lotion, deodorant, or make-up.  Bring a case for your glasses if applicable.   • You may  brush your teeth the morning of surgery.  • You may wear dentures/partials, do not put adhesive/glue on them.  • Bring crutches or walker if applicable.  • Leave all other jewelry and valuables at home.  • NOTIFY YOUR SURGEON IF YOU BECOME ILL, HAVE A FEVER, PRODUCTIVE COUGH, OR CANNOT BE HERE THE DAY OF SURGERY.      Preventing a Surgical Site Infection:    • Shower the night before and on the morning of surgery using the chlorhexidine soap you were given.  Use a clean washcloth with the soap.  Place clean sheets on your bed after showering the night before surgery. Do not use the CHG soap on your hair, face, or private areas. Wash your body gently for five (5) minutes. Do not scrub your skin.  Dry with a clean towel and dress in clean clothing.    • Do not shave the surgical area for 10 days-2 weeks prior to surgery  because the razor can irritate skin and make it easier to develop an infection.  • Make sure you, your family, and all healthcare providers clean their hands with soap and water or an alcohol based hand  before caring for you or your wound.      Day of surgery:    Your surgeon’s office will advise you of your arrival time for the day of surgery.    Upon arrival, a Pre-op nurse and Anesthesia provider will review your health history, obtain vital signs, and answer questions you may have.  The only belongings needed at this time will be your home medications and if applicable your C-PAP/BI-PAP machine.  If you are staying overnight your family can leave the rest of your belongings in the car and bring them to your room later.  A Pre-op nurse will start an IV and you may receive medication in preparation for surgery, including something to help you relax.  Your family will be able to see you in the Pre-op area.  While you are in surgery your family should notify the waiting room  if they leave the waiting room area and provide a contact phone number.    If you have any questions, you  can call the Pre-Admission Department at [phone] or your surgeon's office.    Please be aware that surgery does come with discomfort.  We want to make every effort to control your discomfort so please discuss any uncontrolled symptoms with your nurse.   Your doctor will most likely have prescribed pain medications.     You may have bruising or discomfort from the tourniquet used in surgery.     Please leave all luggage in the car the morning of surgery.  You will be transported to your hospital room following the recovery period.  Your family can get your luggage at that time.      You may receive a survey regarding the care you received. Your feedback is very important and will be used to collect the necessary data to help us to continue to provide excellent care.

## 2019-08-20 NOTE — PAT
Pt here for PAT/[facility] visit.  Pre-op tests completed, chg soap given, and instructions reviewed.  Instructed clears until 2 hrs prior to arrival time, voiced understanding. Instructed to bring CPAP. Pt has no know allergies to metal or nickel. Cardiac clearance has already been obtained

## 2019-08-21 LAB
HBA1C MFR BLD: 6.1 % (ref 4.8–5.6)
MRSA SPEC QL CULT: NORMAL

## 2019-08-23 ENCOUNTER — OFFICE VISIT (OUTPATIENT)
Dept: ORTHOPEDIC SURGERY | Facility: CLINIC | Age: 69
End: 2019-08-23

## 2019-08-23 VITALS — HEIGHT: 72 IN | WEIGHT: 287 LBS | BODY MASS INDEX: 38.87 KG/M2

## 2019-08-23 DIAGNOSIS — M17.0 PRIMARY OSTEOARTHRITIS OF KNEES, BILATERAL: Primary | ICD-10-CM

## 2019-08-23 PROCEDURE — S0260 H&P FOR SURGERY: HCPCS | Performed by: ORTHOPAEDIC SURGERY

## 2019-08-23 ASSESSMENT — KOOS JR
KOOS JR SCORE: 20.941
KOOS JR SCORE: 25

## 2019-08-23 NOTE — H&P
History & Physical       Patient: Jefry Sabillon    YOB: 1950    Medical Record Number: [id number]    Surgeon:  Dr. Lopez Mejias    Chief Complaints:   Chief Complaint   Patient presents with   • Right Knee - Follow-up       Subjective:  This problem is new to this examiner.     History of Present Illness: 68 y.o. male presents with   Chief Complaint   Patient presents with   • Right Knee - Follow-up   . Onset of symptoms was years ago and has been progressively worsening despite more conservative treatment measures.  Symptoms are associated with ability to move, exercise, and perform activities of daily living.  Symptoms are aggravated by weight bearing and ROM necessary for activities of daily living.   Symptoms improve with rest, ice and elevation only minimally.      Allergies:   Allergies   Allergen Reactions   • Penicillins Dermatitis       Medications:   Home Medications:  Current Outpatient Medications on File Prior to Visit   Medication Sig   • allopurinol (ZYLOPRIM) 300 MG tablet 300 mg Daily.   • aspirin 81 MG chewable tablet Chew 81 mg daily.   • cetirizine (zyrTEC) 10 MG tablet Take 10 mg by mouth Daily.   • doxazosin (CARDURA) 4 MG tablet Take 1 tablet by mouth Every Night. (Patient taking differently: Take 4 mg by mouth 2 (Two) Times a Day.)   • fenofibrate (TRICOR) 145 MG tablet Take  by mouth Daily.   • furosemide (LASIX) 20 MG tablet Take 20 mg by mouth As Needed.   • metFORMIN XR (GLUCOPHAGE-XR) 750 MG 24 hr tablet Take 750 mg by mouth Daily With Breakfast.   • metoprolol succinate XL (TOPROL-XL) 25 MG 24 hr tablet Take 1 tablet by mouth Daily. (Patient taking differently: Take 25 mg by mouth Daily.)   • naproxen sodium (ALEVE) 220 MG tablet Take 220 mg by mouth 2 (two) times a day as needed for mild pain (1-3).   • pantoprazole (PROTONIX) 40 MG EC tablet Take 40 mg by mouth Daily.   • valsartan-hydrochlorothiazide (DIOVAN-HCT) 320-25 MG per tablet Take 1 tablet by mouth.     No  current facility-administered medications on file prior to visit.      Current Medications:  Scheduled Meds:  Continuous Infusions:  No current facility-administered medications for this visit.   PRN Meds:.    I have reviewed the patient's medical history in detail and updated the computerized patient record.  Review and summarization of old records include:    Past Medical History:   Diagnosis Date   • Arthritis    • Asthma    • Cardiac disease    • Coronary artery disease    • Diabetes mellitus (CMS/[facility])     take Metformin   • Heart disease    • Hypertension    • Knee sprain    • Mini stroke (CMS/[facility])    • EVE (obstructive sleep apnea)    • Rotator cuff syndrome         Past Surgical History:   Procedure Laterality Date   • HERNIA REPAIR  2007   • KNEE SURGERY  1979        Social History     Occupational History   • Not on file   Tobacco Use   • Smoking status: Never Smoker   • Smokeless tobacco: Never Used   • Tobacco comment: caffeine use   Substance and Sexual Activity   • Alcohol use: Yes     Comment: occasional   • Drug use: No   • Sexual activity: Defer    Social History     Social History Narrative   • Not on file        Family History   Problem Relation Age of Onset   • Hypertension Father    • Heart disease Father    • Stroke Father        ROS: 14 point review of systems was performed and was negative except for documented findings in HPI and today's encounter.     Allergies:   Allergies   Allergen Reactions   • Penicillins Dermatitis     Constitutional:  Denies fever, shaking or chills   Eyes:  Denies change in visual acuity   HENT:  Denies nasal congestion or sore throat   Respiratory:  Denies cough or shortness of breath   Cardiovascular:  Denies chest pain or severe LE edema   GI:  Denies abdominal pain, nausea, vomiting, bloody stools or diarrhea   Musculoskeletal:  Denies numbness tingling or loss of motor function except as outlined above in history of present illness.  : Denies painful urination  or hematuria  Integument:  Denies rash, lesion or ulceration   Neurologic:  Denies headache or focal weakness  Endocrine:  Denies lymphadenopathy  Psych:  Denies confusion or change in mental status   Hem:  Denies active bleeding    Physical Exam: 68 y.o. male  Body mass index is 38.92 kg/m².  There were no vitals filed for this visit.  Vital signs reviewed.   General Appearance:    Alert, cooperative, in no acute distress                  Eyes: conjunctiva clear  ENT: external ears and nose atraumatic  CV: no peripheral edema  Resp: normal respiratory effort  Skin: no rashes or wounds; normal turgor  Psych: mood and affect appropriate  Lymph: no nodes appreciated  Neuro: gross sensation intact  Vascular:  Palpable peripheral pulse in noted extremity  Musculoskeletal Extremities:    Right Knee-  ROM- 3-115 degrees  1+ effusion  Varus alignment  Maximal tenderness medial joint line  Prior medial scar is well healed  Positive Keith's exam  No pain on Stinchfield or Log Roll exam  Negative straight leg raise    Debilities/Disabilities Identified: None      Diagnostic Tests:  Appointment on 08/20/2019   Component Date Value Ref Range Status   • Color, UA 08/20/2019 Yellow  Yellow, Straw Final   • Appearance, UA 08/20/2019 Clear  Clear Final   • pH, UA 08/20/2019 5.5  4.5 - 8.0 Final   • Specific Gravity, UA 08/20/2019 1.020  1.003 - 1.030 Final   • Glucose, UA 08/20/2019 Negative  Negative Final   • Ketones, UA 08/20/2019 Trace* Negative Final   • Bilirubin, UA 08/20/2019 Negative  Negative Final   • Blood, UA 08/20/2019 Negative  Negative Final   • Protein, UA 08/20/2019 Negative  Negative Final   • Leuk Esterase, UA 08/20/2019 Negative  Negative Final   • Nitrite, UA 08/20/2019 Negative  Negative Final   • Urobilinogen, UA 08/20/2019 0.2 E.U./dL  0.2 - 1.0 E.U./dL Final   • PTT 08/20/2019 28.4  24.3 - 38.1 seconds Final   • Protime 08/20/2019 13.2  12.1 - 15.0 Seconds Final   • INR 08/20/2019 1.03  0.90 - 1.10  Final   • Glucose 08/20/2019 111* 65 - 99 mg/dL Final   • BUN 08/20/2019 21  8 - 23 mg/dL Final   • Creatinine 08/20/2019 1.35* 0.76 - 1.27 mg/dL Final   • Sodium 08/20/2019 143  136 - 145 mmol/L Final   • Potassium 08/20/2019 4.8  3.5 - 5.2 mmol/L Final   • Chloride 08/20/2019 102  98 - 107 mmol/L Final   • CO2 08/20/2019 26.8  22.0 - 29.0 mmol/L Final   • Calcium 08/20/2019 9.8  8.6 - 10.5 mg/dL Final   • eGFR Non African Amer 08/20/2019 53* >60 mL/min/1.73 Final   • BUN/Creatinine Ratio 08/20/2019 15.6  7.0 - 25.0 Final   • Anion Gap 08/20/2019 14.2  5.0 - 15.0 mmol/L Final   • MRSA SCREEN CX 08/20/2019 No Methicillin Resistant Staphylococcus aureus isolated   Final   • WBC 08/20/2019 6.92  3.40 - 10.80 10*3/mm3 Final   • RBC 08/20/2019 4.55  4.14 - 5.80 10*6/mm3 Final   • Hemoglobin 08/20/2019 13.3  13.0 - 17.7 g/dL Final   • Hematocrit 08/20/2019 40.2  37.5 - 51.0 % Final   • MCV 08/20/2019 88.4  79.0 - 97.0 fL Final   • MCH 08/20/2019 29.2  26.6 - 33.0 pg Final   • MCHC 08/20/2019 33.1  31.5 - 35.7 g/dL Final   • RDW 08/20/2019 14.7  12.3 - 15.4 % Final   • RDW-SD 08/20/2019 47.8  37.0 - 54.0 fl Final   • MPV 08/20/2019 10.4  6.0 - 12.0 fL Final   • Platelets 08/20/2019 288  140 - 450 10*3/mm3 Final   • Neutrophil % 08/20/2019 63.3  42.7 - 76.0 % Final   • Lymphocyte % 08/20/2019 24.9  19.6 - 45.3 % Final   • Monocyte % 08/20/2019 8.7  5.0 - 12.0 % Final   • Eosinophil % 08/20/2019 1.9  0.3 - 6.2 % Final   • Basophil % 08/20/2019 0.6  0.0 - 1.5 % Final   • Immature Grans % 08/20/2019 0.6* 0.0 - 0.5 % Final   • Neutrophils, Absolute 08/20/2019 4.39  1.70 - 7.00 10*3/mm3 Final   • Lymphocytes, Absolute 08/20/2019 1.72  0.70 - 3.10 10*3/mm3 Final   • Monocytes, Absolute 08/20/2019 0.60  0.10 - 0.90 10*3/mm3 Final   • Eosinophils, Absolute 08/20/2019 0.13  0.00 - 0.40 10*3/mm3 Final   • Basophils, Absolute 08/20/2019 0.04  0.00 - 0.20 10*3/mm3 Final   • Immature Grans, Absolute 08/20/2019 0.04  0.00 - 0.05  10*3/mm3 Final   • nRBC 08/20/2019 0.0  0.0 - 0.2 /100 WBC Final   • Hemoglobin A1C 08/20/2019 6.10* 4.80 - 5.60 % Final     No results found.    Assessment:  Patient Active Problem List   Diagnosis   • Primary osteoarthritis of knees, bilateral   • Acute dyspnea   • Pericarditis   • Essential hypertension   • Mixed hyperlipidemia   • Primary osteoarthritis of right knee       Plan:  I reviewed anatomy of a total joint arthroplasty in laymen's terms, as well as typical postoperative recovery and possibly 6-12 months for maximal recovery, and possible need for rehabilitation stay after hospitalization. We also discussed risks, benefits, alternatives, and limitations of procedure with risks including but not limited to neurovascular damage, bleeding, infection, malalignment, chronic pian, failure of implants, osteolysis, loosening of implants, loss of motion, weakness, stiffness, instability, DVT, pulmonary embolus, death, stroke, complex regional pain syndrome, myocardial infarction, and need for additional procedures. No guarantees were given regarding results of surgery.      Jefry Sabillon was given the opportunity to ask and have all questions answered today.  The patient voiced understanding of the risks, benefits, and alternative forms of treatment that were discussed and the patient consents to proceed with surgery.     Patient's blood clot history is negative.    Plan for DVT prophylaxis is asa    Patient's MRSA infection history is negative.    Patient's skin infection history is negative.    Discharge Plan: POD 2-3 to home    Date: 8/23/2019    Dictated utilizing Dragon dictation

## 2019-09-03 ENCOUNTER — ANESTHESIA EVENT (OUTPATIENT)
Dept: PERIOP | Facility: HOSPITAL | Age: 69
End: 2019-09-03

## 2019-09-03 ENCOUNTER — TELEPHONE (OUTPATIENT)
Dept: ORTHOPEDIC SURGERY | Facility: CLINIC | Age: 69
End: 2019-09-03

## 2019-09-03 NOTE — TELEPHONE ENCOUNTER
Spoke with patient on 8/23/2019 about work status. Patient agrees that Dr Rocha told him he was at [employer] and only needed to return to office if he felt he needed a cortisone injection in shoulder.  Told him that as of 7/25/2019 (date after his last appt) patient was release to return to work. We cannot keep him off if he is at [employer] and we are no longer needing to see him in the office for further treatment. Patient stated he already lost that job and was on disability.

## 2019-09-04 ENCOUNTER — HOSPITAL ENCOUNTER (OUTPATIENT)
Facility: HOSPITAL | Age: 69
Discharge: HOME | End: 2019-09-05
Attending: ORTHOPAEDIC SURGERY | Admitting: ORTHOPAEDIC SURGERY

## 2019-09-04 ENCOUNTER — APPOINTMENT (OUTPATIENT)
Dept: GENERAL RADIOLOGY | Facility: HOSPITAL | Age: 69
End: 2019-09-04

## 2019-09-04 ENCOUNTER — ANESTHESIA (OUTPATIENT)
Dept: PERIOP | Facility: HOSPITAL | Age: 69
End: 2019-09-04

## 2019-09-04 DIAGNOSIS — M17.11 PRIMARY OSTEOARTHRITIS OF RIGHT KNEE: ICD-10-CM

## 2019-09-04 DIAGNOSIS — Z96.651 STATUS POST TOTAL RIGHT KNEE REPLACEMENT: Primary | ICD-10-CM

## 2019-09-04 PROBLEM — Z96.659 S/P TOTAL KNEE ARTHROPLASTY: Status: ACTIVE | Noted: 2019-09-04

## 2019-09-04 LAB
ABO GROUP BLD: NORMAL
ABO GROUP BLD: NORMAL
BLD GP AB SCN SERPL QL: NEGATIVE
GLUCOSE BLDC GLUCOMTR-MCNC: 91 MG/DL (ref 70–130)
POTASSIUM BLD-SCNC: 4.2 MMOL/L (ref 3.5–5.2)
RH BLD: POSITIVE
RH BLD: POSITIVE
T&S EXPIRATION DATE: NORMAL

## 2019-09-04 PROCEDURE — 94799 UNLISTED PULMONARY SVC/PX: CPT

## 2019-09-04 PROCEDURE — C1713 ANCHOR/SCREW BN/BN,TIS/BN: HCPCS | Performed by: ORTHOPAEDIC SURGERY

## 2019-09-04 PROCEDURE — 82962 GLUCOSE BLOOD TEST: CPT

## 2019-09-04 PROCEDURE — 86900 BLOOD TYPING SEROLOGIC ABO: CPT

## 2019-09-04 PROCEDURE — 27447 TOTAL KNEE ARTHROPLASTY: CPT | Performed by: ORTHOPAEDIC SURGERY

## 2019-09-04 PROCEDURE — 86850 RBC ANTIBODY SCREEN: CPT | Performed by: ORTHOPAEDIC SURGERY

## 2019-09-04 PROCEDURE — C1776 JOINT DEVICE (IMPLANTABLE): HCPCS | Performed by: ORTHOPAEDIC SURGERY

## 2019-09-04 PROCEDURE — 0396T: CPT | Performed by: ORTHOPAEDIC SURGERY

## 2019-09-04 PROCEDURE — 86901 BLOOD TYPING SEROLOGIC RH(D): CPT | Performed by: ORTHOPAEDIC SURGERY

## 2019-09-04 PROCEDURE — 86901 BLOOD TYPING SEROLOGIC RH(D): CPT

## 2019-09-04 PROCEDURE — 84132 ASSAY OF SERUM POTASSIUM: CPT | Performed by: NURSE ANESTHETIST, CERTIFIED REGISTERED

## 2019-09-04 PROCEDURE — 25010000002 MIDAZOLAM PER 1 MG: Performed by: NURSE ANESTHETIST, CERTIFIED REGISTERED

## 2019-09-04 PROCEDURE — G0378 HOSPITAL OBSERVATION PER HR: HCPCS

## 2019-09-04 PROCEDURE — 25010000002 ONDANSETRON PER 1 MG: Performed by: NURSE ANESTHETIST, CERTIFIED REGISTERED

## 2019-09-04 PROCEDURE — 73560 X-RAY EXAM OF KNEE 1 OR 2: CPT

## 2019-09-04 PROCEDURE — 25010000002 PROPOFOL 10 MG/ML EMULSION: Performed by: NURSE ANESTHETIST, CERTIFIED REGISTERED

## 2019-09-04 PROCEDURE — 25010000002 PROPOFOL 1000 MG/ML EMULSION: Performed by: NURSE ANESTHETIST, CERTIFIED REGISTERED

## 2019-09-04 PROCEDURE — 25010000002 ROPIVACAINE PER 1 MG: Performed by: NURSE ANESTHETIST, CERTIFIED REGISTERED

## 2019-09-04 PROCEDURE — 25010000002 DEXAMETHASONE PER 1 MG: Performed by: NURSE ANESTHETIST, CERTIFIED REGISTERED

## 2019-09-04 PROCEDURE — L1830 KO IMMOB CANVAS LONG PRE OTS: HCPCS | Performed by: ORTHOPAEDIC SURGERY

## 2019-09-04 PROCEDURE — 86900 BLOOD TYPING SEROLOGIC ABO: CPT | Performed by: ORTHOPAEDIC SURGERY

## 2019-09-04 PROCEDURE — 25010000002 VANCOMYCIN 1 G RECONSTITUTED SOLUTION 1 EACH VIAL: Performed by: ORTHOPAEDIC SURGERY

## 2019-09-04 PROCEDURE — 25010000002 VANCOMYCIN 1 G RECONSTITUTED SOLUTION: Performed by: ORTHOPAEDIC SURGERY

## 2019-09-04 DEVICE — CMT BONE PALACOS RPLSG W/GENT HI/VISC 1X40: Type: IMPLANTABLE DEVICE | Site: KNEE | Status: FUNCTIONAL

## 2019-09-04 DEVICE — CAP GUIDE PSI/SIGNATURE/I-ASSIST: Type: IMPLANTABLE DEVICE | Site: KNEE | Status: FUNCTIONAL

## 2019-09-04 DEVICE — ART/SRF KN PERSONA/VE MC EF 8TO11 11MM RT: Type: IMPLANTABLE DEVICE | Site: KNEE | Status: FUNCTIONAL

## 2019-09-04 DEVICE — CAP BEAR KN VE UPCHRG: Type: IMPLANTABLE DEVICE | Site: KNEE | Status: FUNCTIONAL

## 2019-09-04 DEVICE — STEM TIB/KN PERSONA CMT 5D SZF RT: Type: IMPLANTABLE DEVICE | Site: KNEE | Status: FUNCTIONAL

## 2019-09-04 DEVICE — CAP TOTL KN CMT PREMIUM: Type: IMPLANTABLE DEVICE | Site: KNEE | Status: FUNCTIONAL

## 2019-09-04 DEVICE — COMP FEM/KN PERSONA CR CMT COCR STD SZ11 RT: Type: IMPLANTABLE DEVICE | Site: KNEE | Status: FUNCTIONAL

## 2019-09-04 DEVICE — PAT KN PERSONA VE CRS/LNK CMT 9.5X38MM: Type: IMPLANTABLE DEVICE | Site: KNEE | Status: FUNCTIONAL

## 2019-09-04 DEVICE — CAP PAT KN VE/TM UPCHRG: Type: IMPLANTABLE DEVICE | Site: KNEE | Status: FUNCTIONAL

## 2019-09-04 DEVICE — EXT STEM FEM/KN PERSONA TPR 14XPLS30MM: Type: IMPLANTABLE DEVICE | Site: KNEE | Status: FUNCTIONAL

## 2019-09-04 RX ORDER — MORPHINE SULFATE 10 MG/ML
6 INJECTION INTRAMUSCULAR; INTRAVENOUS; SUBCUTANEOUS
Status: DISCONTINUED | OUTPATIENT
Start: 2019-09-04 | End: 2019-09-05 | Stop reason: HOSPADM

## 2019-09-04 RX ORDER — SODIUM CHLORIDE 9 MG/ML
40 INJECTION, SOLUTION INTRAVENOUS AS NEEDED
Status: DISCONTINUED | OUTPATIENT
Start: 2019-09-04 | End: 2019-09-04 | Stop reason: HOSPADM

## 2019-09-04 RX ORDER — ASPIRIN 81 MG/1
81 TABLET, CHEWABLE ORAL DAILY
Status: DISCONTINUED | OUTPATIENT
Start: 2019-09-04 | End: 2019-09-05 | Stop reason: HOSPADM

## 2019-09-04 RX ORDER — MELOXICAM 7.5 MG/1
15 TABLET ORAL ONCE
Status: COMPLETED | OUTPATIENT
Start: 2019-09-04 | End: 2019-09-04

## 2019-09-04 RX ORDER — ROPIVACAINE HYDROCHLORIDE 2 MG/ML
INJECTION, SOLUTION EPIDURAL; INFILTRATION; PERINEURAL
Status: COMPLETED | OUTPATIENT
Start: 2019-09-04 | End: 2019-09-04

## 2019-09-04 RX ORDER — MIDAZOLAM HYDROCHLORIDE 1 MG/ML
1 INJECTION INTRAMUSCULAR; INTRAVENOUS
Status: DISCONTINUED | OUTPATIENT
Start: 2019-09-04 | End: 2019-09-04 | Stop reason: HOSPADM

## 2019-09-04 RX ORDER — NALOXONE HCL 0.4 MG/ML
0.4 VIAL (ML) INJECTION
Status: DISCONTINUED | OUTPATIENT
Start: 2019-09-04 | End: 2019-09-05 | Stop reason: HOSPADM

## 2019-09-04 RX ORDER — ONDANSETRON 4 MG/1
4 TABLET, FILM COATED ORAL EVERY 6 HOURS PRN
Status: DISCONTINUED | OUTPATIENT
Start: 2019-09-04 | End: 2019-09-05 | Stop reason: HOSPADM

## 2019-09-04 RX ORDER — SODIUM CHLORIDE, SODIUM LACTATE, POTASSIUM CHLORIDE, CALCIUM CHLORIDE 600; 310; 30; 20 MG/100ML; MG/100ML; MG/100ML; MG/100ML
9 INJECTION, SOLUTION INTRAVENOUS CONTINUOUS PRN
Status: DISCONTINUED | OUTPATIENT
Start: 2019-09-04 | End: 2019-09-04 | Stop reason: HOSPADM

## 2019-09-04 RX ORDER — HYDROCODONE BITARTRATE AND ACETAMINOPHEN 10; 325 MG/1; MG/1
1 TABLET ORAL EVERY 4 HOURS PRN
Status: DISCONTINUED | OUTPATIENT
Start: 2019-09-04 | End: 2019-09-04

## 2019-09-04 RX ORDER — BUPIVACAINE HYDROCHLORIDE 5 MG/ML
INJECTION, SOLUTION PERINEURAL
Status: COMPLETED | OUTPATIENT
Start: 2019-09-04 | End: 2019-09-04

## 2019-09-04 RX ORDER — PREGABALIN 75 MG/1
150 CAPSULE ORAL ONCE
Status: COMPLETED | OUTPATIENT
Start: 2019-09-04 | End: 2019-09-04

## 2019-09-04 RX ORDER — DEXAMETHASONE SODIUM PHOSPHATE 4 MG/ML
8 INJECTION, SOLUTION INTRA-ARTICULAR; INTRALESIONAL; INTRAMUSCULAR; INTRAVENOUS; SOFT TISSUE ONCE AS NEEDED
Status: COMPLETED | OUTPATIENT
Start: 2019-09-04 | End: 2019-09-04

## 2019-09-04 RX ORDER — METOPROLOL SUCCINATE 25 MG/1
25 TABLET, EXTENDED RELEASE ORAL
Status: DISCONTINUED | OUTPATIENT
Start: 2019-09-04 | End: 2019-09-05 | Stop reason: HOSPADM

## 2019-09-04 RX ORDER — VANCOMYCIN HYDROCHLORIDE 1 G/20ML
INJECTION, POWDER, LYOPHILIZED, FOR SOLUTION INTRAVENOUS AS NEEDED
Status: DISCONTINUED | OUTPATIENT
Start: 2019-09-04 | End: 2019-09-04 | Stop reason: HOSPADM

## 2019-09-04 RX ORDER — SENNA AND DOCUSATE SODIUM 50; 8.6 MG/1; MG/1
2 TABLET, FILM COATED ORAL 2 TIMES DAILY PRN
Status: DISCONTINUED | OUTPATIENT
Start: 2019-09-04 | End: 2019-09-05 | Stop reason: HOSPADM

## 2019-09-04 RX ORDER — KETAMINE HYDROCHLORIDE 10 MG/ML
INJECTION INTRAMUSCULAR; INTRAVENOUS AS NEEDED
Status: DISCONTINUED | OUTPATIENT
Start: 2019-09-04 | End: 2019-09-04 | Stop reason: SURG

## 2019-09-04 RX ORDER — SODIUM CHLORIDE 9 MG/ML
100 INJECTION, SOLUTION INTRAVENOUS CONTINUOUS
Status: DISCONTINUED | OUTPATIENT
Start: 2019-09-04 | End: 2019-09-05 | Stop reason: HOSPADM

## 2019-09-04 RX ORDER — FENOFIBRATE 145 MG/1
145 TABLET, COATED ORAL DAILY
Status: DISCONTINUED | OUTPATIENT
Start: 2019-09-04 | End: 2019-09-05 | Stop reason: HOSPADM

## 2019-09-04 RX ORDER — SODIUM CHLORIDE 0.9 % (FLUSH) 0.9 %
1-10 SYRINGE (ML) INJECTION AS NEEDED
Status: DISCONTINUED | OUTPATIENT
Start: 2019-09-04 | End: 2019-09-04 | Stop reason: HOSPADM

## 2019-09-04 RX ORDER — GLYCOPYRROLATE 0.2 MG/ML
INJECTION INTRAMUSCULAR; INTRAVENOUS AS NEEDED
Status: DISCONTINUED | OUTPATIENT
Start: 2019-09-04 | End: 2019-09-04 | Stop reason: SURG

## 2019-09-04 RX ORDER — MAGNESIUM HYDROXIDE 1200 MG/15ML
LIQUID ORAL AS NEEDED
Status: DISCONTINUED | OUTPATIENT
Start: 2019-09-04 | End: 2019-09-04 | Stop reason: HOSPADM

## 2019-09-04 RX ORDER — DOCUSATE SODIUM 100 MG/1
100 CAPSULE, LIQUID FILLED ORAL 2 TIMES DAILY PRN
Status: DISCONTINUED | OUTPATIENT
Start: 2019-09-04 | End: 2019-09-05 | Stop reason: HOSPADM

## 2019-09-04 RX ORDER — MIDAZOLAM HYDROCHLORIDE 1 MG/ML
2 INJECTION INTRAMUSCULAR; INTRAVENOUS
Status: DISCONTINUED | OUTPATIENT
Start: 2019-09-04 | End: 2019-09-04 | Stop reason: HOSPADM

## 2019-09-04 RX ORDER — SODIUM CHLORIDE 0.9 % (FLUSH) 0.9 %
3 SYRINGE (ML) INJECTION EVERY 12 HOURS SCHEDULED
Status: DISCONTINUED | OUTPATIENT
Start: 2019-09-04 | End: 2019-09-04 | Stop reason: HOSPADM

## 2019-09-04 RX ORDER — PROPOFOL 10 MG/ML
VIAL (ML) INTRAVENOUS AS NEEDED
Status: DISCONTINUED | OUTPATIENT
Start: 2019-09-04 | End: 2019-09-04 | Stop reason: SURG

## 2019-09-04 RX ORDER — CETIRIZINE HYDROCHLORIDE 10 MG/1
5 TABLET ORAL DAILY
Status: DISCONTINUED | OUTPATIENT
Start: 2019-09-04 | End: 2019-09-05 | Stop reason: HOSPADM

## 2019-09-04 RX ORDER — ACETAMINOPHEN 500 MG
1000 TABLET ORAL ONCE
Status: COMPLETED | OUTPATIENT
Start: 2019-09-04 | End: 2019-09-04

## 2019-09-04 RX ORDER — SODIUM CHLORIDE 9 MG/ML
INJECTION, SOLUTION INTRAVENOUS AS NEEDED
Status: DISCONTINUED | OUTPATIENT
Start: 2019-09-04 | End: 2019-09-04 | Stop reason: HOSPADM

## 2019-09-04 RX ORDER — TERAZOSIN 5 MG/1
5 CAPSULE ORAL NIGHTLY
Status: DISCONTINUED | OUTPATIENT
Start: 2019-09-04 | End: 2019-09-05 | Stop reason: HOSPADM

## 2019-09-04 RX ORDER — FUROSEMIDE 20 MG/1
20 TABLET ORAL DAILY
Status: DISCONTINUED | OUTPATIENT
Start: 2019-09-04 | End: 2019-09-05 | Stop reason: HOSPADM

## 2019-09-04 RX ORDER — OXYCODONE HYDROCHLORIDE 5 MG/1
10 TABLET ORAL EVERY 4 HOURS PRN
Status: DISCONTINUED | OUTPATIENT
Start: 2019-09-04 | End: 2019-09-05 | Stop reason: HOSPADM

## 2019-09-04 RX ORDER — HYDROCODONE BITARTRATE AND ACETAMINOPHEN 5; 325 MG/1; MG/1
1 TABLET ORAL EVERY 4 HOURS PRN
Status: DISCONTINUED | OUTPATIENT
Start: 2019-09-04 | End: 2019-09-04

## 2019-09-04 RX ORDER — ALLOPURINOL 300 MG/1
300 TABLET ORAL DAILY
Status: DISCONTINUED | OUTPATIENT
Start: 2019-09-04 | End: 2019-09-05 | Stop reason: HOSPADM

## 2019-09-04 RX ORDER — PREGABALIN 75 MG/1
75 CAPSULE ORAL EVERY 12 HOURS SCHEDULED
Status: DISCONTINUED | OUTPATIENT
Start: 2019-09-04 | End: 2019-09-05 | Stop reason: HOSPADM

## 2019-09-04 RX ORDER — ONDANSETRON 2 MG/ML
4 INJECTION INTRAMUSCULAR; INTRAVENOUS EVERY 6 HOURS PRN
Status: DISCONTINUED | OUTPATIENT
Start: 2019-09-04 | End: 2019-09-05 | Stop reason: HOSPADM

## 2019-09-04 RX ORDER — ONDANSETRON 2 MG/ML
4 INJECTION INTRAMUSCULAR; INTRAVENOUS ONCE AS NEEDED
Status: COMPLETED | OUTPATIENT
Start: 2019-09-04 | End: 2019-09-04

## 2019-09-04 RX ORDER — LIDOCAINE HYDROCHLORIDE 10 MG/ML
0.5 INJECTION, SOLUTION EPIDURAL; INFILTRATION; INTRACAUDAL; PERINEURAL ONCE AS NEEDED
Status: COMPLETED | OUTPATIENT
Start: 2019-09-04 | End: 2019-09-04

## 2019-09-04 RX ORDER — OXYCODONE HYDROCHLORIDE 5 MG/1
5 TABLET ORAL EVERY 4 HOURS PRN
Status: DISCONTINUED | OUTPATIENT
Start: 2019-09-04 | End: 2019-09-05 | Stop reason: HOSPADM

## 2019-09-04 RX ADMIN — CETIRIZINE HYDROCHLORIDE 5 MG: 10 TABLET, FILM COATED ORAL at 17:46

## 2019-09-04 RX ADMIN — PREGABALIN 150 MG: 75 CAPSULE ORAL at 10:48

## 2019-09-04 RX ADMIN — MIDAZOLAM HYDROCHLORIDE 1 MG: 1 INJECTION, SOLUTION INTRAMUSCULAR; INTRAVENOUS at 11:20

## 2019-09-04 RX ADMIN — SODIUM CHLORIDE, POTASSIUM CHLORIDE, SODIUM LACTATE AND CALCIUM CHLORIDE 9 ML/HR: 600; 310; 30; 20 INJECTION, SOLUTION INTRAVENOUS at 10:47

## 2019-09-04 RX ADMIN — FENOFIBRATE 145 MG: 145 TABLET, FILM COATED ORAL at 17:47

## 2019-09-04 RX ADMIN — PREGABALIN 75 MG: 75 CAPSULE ORAL at 21:10

## 2019-09-04 RX ADMIN — KETAMINE HYDROCHLORIDE 50 MG: 10 INJECTION INTRAMUSCULAR; INTRAVENOUS at 13:00

## 2019-09-04 RX ADMIN — MIDAZOLAM HYDROCHLORIDE 1 MG: 1 INJECTION, SOLUTION INTRAMUSCULAR; INTRAVENOUS at 11:34

## 2019-09-04 RX ADMIN — GLYCOPYRROLATE 0.2 MG: 0.2 INJECTION INTRAMUSCULAR; INTRAVENOUS at 12:40

## 2019-09-04 RX ADMIN — BUPIVACAINE HYDROCHLORIDE 1.6 ML: 5 INJECTION, SOLUTION PERINEURAL at 12:39

## 2019-09-04 RX ADMIN — DEXMEDETOMIDINE HYDROCHLORIDE 20 MCG: 100 INJECTION, SOLUTION, CONCENTRATE INTRAVENOUS at 15:23

## 2019-09-04 RX ADMIN — ROPIVACAINE HYDROCHLORIDE 20 ML: 2 INJECTION, SOLUTION EPIDURAL; INFILTRATION at 11:48

## 2019-09-04 RX ADMIN — DEXMEDETOMIDINE HYDROCHLORIDE 20 MCG: 100 INJECTION, SOLUTION, CONCENTRATE INTRAVENOUS at 12:40

## 2019-09-04 RX ADMIN — SODIUM CHLORIDE, POTASSIUM CHLORIDE, SODIUM LACTATE AND CALCIUM CHLORIDE: 600; 310; 30; 20 INJECTION, SOLUTION INTRAVENOUS at 15:40

## 2019-09-04 RX ADMIN — OXYCODONE HYDROCHLORIDE 10 MG: 5 TABLET ORAL at 21:47

## 2019-09-04 RX ADMIN — ONDANSETRON 4 MG: 2 INJECTION, SOLUTION INTRAMUSCULAR; INTRAVENOUS at 11:20

## 2019-09-04 RX ADMIN — DEXMEDETOMIDINE HYDROCHLORIDE 20 MCG: 100 INJECTION, SOLUTION, CONCENTRATE INTRAVENOUS at 12:50

## 2019-09-04 RX ADMIN — ACETAMINOPHEN 1000 MG: 500 TABLET, FILM COATED ORAL at 10:47

## 2019-09-04 RX ADMIN — PROPOFOL 30 MG: 10 INJECTION, EMULSION INTRAVENOUS at 13:07

## 2019-09-04 RX ADMIN — OXYCODONE HYDROCHLORIDE 10 MG: 5 TABLET ORAL at 17:52

## 2019-09-04 RX ADMIN — BUPIVACAINE HYDROCHLORIDE 8 ML/HR: 5 INJECTION, SOLUTION EPIDURAL; INTRACAUDAL; PERINEURAL at 16:18

## 2019-09-04 RX ADMIN — PROPOFOL 40 MCG/KG/MIN: 10 INJECTION, EMULSION INTRAVENOUS at 12:51

## 2019-09-04 RX ADMIN — SODIUM CHLORIDE 100 ML/HR: 9 INJECTION, SOLUTION INTRAVENOUS at 21:18

## 2019-09-04 RX ADMIN — FAMOTIDINE 20 MG: 10 INJECTION, SOLUTION INTRAVENOUS at 11:20

## 2019-09-04 RX ADMIN — VANCOMYCIN HYDROCHLORIDE 2000 MG: 1 INJECTION, POWDER, LYOPHILIZED, FOR SOLUTION INTRAVENOUS at 22:38

## 2019-09-04 RX ADMIN — ALLOPURINOL 300 MG: 300 TABLET ORAL at 17:46

## 2019-09-04 RX ADMIN — TERAZOSIN HYDROCHLORIDE 5 MG: 5 CAPSULE ORAL at 21:11

## 2019-09-04 RX ADMIN — PROPOFOL 20 MG: 10 INJECTION, EMULSION INTRAVENOUS at 13:35

## 2019-09-04 RX ADMIN — VANCOMYCIN HYDROCHLORIDE 2000 MG: 1 INJECTION, POWDER, LYOPHILIZED, FOR SOLUTION INTRAVENOUS at 10:47

## 2019-09-04 RX ADMIN — ASPIRIN 81 MG 81 MG: 81 TABLET ORAL at 17:46

## 2019-09-04 RX ADMIN — DEXAMETHASONE SODIUM PHOSPHATE 8 MG: 4 INJECTION, SOLUTION INTRAMUSCULAR; INTRAVENOUS at 11:20

## 2019-09-04 RX ADMIN — MELOXICAM 15 MG: 7.5 TABLET ORAL at 10:48

## 2019-09-04 RX ADMIN — LIDOCAINE HYDROCHLORIDE 0.5 ML: 10 INJECTION, SOLUTION EPIDURAL; INFILTRATION; INTRACAUDAL; PERINEURAL at 10:47

## 2019-09-04 RX ADMIN — KETAMINE HYDROCHLORIDE 30 MG: 10 INJECTION INTRAMUSCULAR; INTRAVENOUS at 12:51

## 2019-09-04 NOTE — ANESTHESIA POSTPROCEDURE EVALUATION
Patient: Jefry Sabillon    Procedure Summary     Date:  09/04/19 Room / Location:   [facility] /  [facility]    Anesthesia Start:  1246 Anesthesia Stop:  1540    Procedure:  TOTAL KNEE ARTHROPLASTY AND ALL ASSOCIATED PROCEDURES (Right Knee) Diagnosis:       Primary osteoarthritis of right knee      (Primary osteoarthritis of right knee [M17.11])    Surgeon:  Lopez Mejias MD Provider:  Vitaliy Baxter CRNA    Anesthesia Type:  regional, spinal, MAC ASA Status:  3          Anesthesia Type: regional, spinal, MAC  Last vitals  BP   114/67 (09/04/19 1629)   Temp   98.2 °F (36.8 °C) (09/04/19 1629)   Pulse   63 (09/04/19 1629)   Resp   16 (09/04/19 1629)     SpO2   96 % (09/04/19 1629)     Post Anesthesia Care and Evaluation    Patient location during evaluation: PACU  Patient participation: complete - patient participated  Level of consciousness: awake and alert  Pain score: 0  Pain management: adequate  Airway patency: patent  Anesthetic complications: No anesthetic complications  PONV Status: none  Cardiovascular status: acceptable  Respiratory status: acceptable  Hydration status: acceptable    Comments: Patient not seen prior to discharge. No events noted in chart.

## 2019-09-04 NOTE — ANESTHESIA PREPROCEDURE EVALUATION
Anesthesia Evaluation     Patient summary reviewed and Nursing notes reviewed   NPO Solid Status: > 8 hours  NPO Liquid Status: > 2 hours           Airway   Mallampati: III  TM distance: >3 FB  Neck ROM: full  Possible difficult intubation  Dental    (+) partials        Pulmonary     breath sounds clear to auscultation  (+) asthma ( last inhaler 1 year ago), shortness of breath ( with exertion), sleep apnea on CPAP,   Cardiovascular     ECG reviewed  PT is on anticoagulation therapy  Patient on routine beta blocker and Beta blocker given within 24 hours of surgery  Rhythm: regular  Rate: normal    (+) hypertension 2 medications or greater, CAD, hyperlipidemia,       Neuro/Psych  (+) TIA ( 2001. no residual problems),     GI/Hepatic/Renal/Endo    (+) obesity,   diabetes mellitus,     Musculoskeletal     Abdominal   (+) obese,    Substance History   (+) alcohol use ( occasional),      OB/GYN          Other   (+) arthritis     ROS/Med Hx Other: 20 oz gatorade at 0830    Last asa 1 week ago      ER visit 1 year ago for chest pain. Cardiac workup below. Result = pericarditis.    Results   ECG 12 Lead (Order [id number])   Order-Level Documents:     Scan on 7/18/2019 10:50 AM by Muriel Schaefer: [facility] EKG JF        ECG 12 Lead   Order: [id number]   Status:  Final result   Visible to patient:  Yes (MyChart) Next appt:  09/20/2019 at 09:30 AM in Orthopedic Surgery ([facility]) Dx:  Essential hypertension; Preop cardiov...     Narrative       Maria L Medrano APRN     7/17/2019 11:02 AM    ECG 12 Lead  Date/Time: 7/17/2019 10:44 AM  Performed by: Maria L Medrano APRN  Authorized by: Maria L Medrano APRN   Comparison: compared with previous ECG from 5/14/2018  Similar to previous ECG  Rhythm: sinus rhythm  Rate: normal  Conduction: right bundle branch block  ST Segments: ST segments normal  T Waves: T waves normal  QRS axis: normal    Clinical impression: abnormal EKG    Interpretation Summary     · Left ventricular wall  thickness is consistent with mild concentric hypertrophy.  · Left ventricular systolic function is hyperdynamic (EF > 70).  · Left ventricular diastolic dysfunction (grade II) consistent with pseudonormalization.       Interpretation Summary     · Left ventricular ejection fraction is normal (Calculated EF = 70%).  · Myocardial perfusion imaging indicates a normal myocardial perfusion study with no evidence of ischemia.  · Impressions are consistent with a low risk study.                         Phys Exam Other: Lower partial              Anesthesia Plan    ASA 3     regional, spinal and MAC     intravenous induction   Anesthetic plan, all risks, benefits, and alternatives have been provided, discussed and informed consent has been obtained with: patient.  Use of blood products discussed with patient  Consented to blood products.   Plan discussed with CRNA.

## 2019-09-04 NOTE — ANESTHESIA PROCEDURE NOTES
Peripheral Block    Pre-sedation assessment completed: 9/4/2019 11:31 AM    Patient reassessed immediately prior to procedure    Patient location during procedure: pre-op  Start time: 9/4/2019 11:32 AM  Stop time: 9/4/2019 11:37 AM  Reason for block: at surgeon's request and post-op pain management  Performed by  CRNA: Leora Allan CRNA  Preanesthetic Checklist  Completed: patient identified, site marked, surgical consent, pre-op evaluation, timeout performed, IV checked, risks and benefits discussed and monitors and equipment checked  Prep:  Pt Position: supine  Sterile barriers:cap, gloves, mask and sterile barriers  Prep: ChloraPrep  Patient monitoring: blood pressure monitoring, continuous pulse oximetry and EKG  Procedure  Sedation:yes  Performed under: PNB  Guidance:ultrasound guided  ULTRASOUND INTERPRETATION.  Using ultrasound guidance a 21 G gauge needle was placed in close proximity to the femoral nerve, at which point, under ultrasound guidance anesthetic was injected in the area of the nerve and spread of the anesthesia was seen on ultrasound in close proximity thereto.  There were no abnormalities seen on ultrasound; a digital image was taken; and the patient tolerated the procedure with no complications. Images:still images obtained, printed/placed on chart    Laterality:right  Block Type:adductor canal block  Injection Technique:catheter  Needle Type:echogenic  Needle Gauge:18 G  Resistance on Injection: none  Catheter Size:20 G    Medications Used: ropivacaine (NAROPIN) 0.2% injection, 20 mL  Med admintered at 9/4/2019 11:48 AM      Post Assessment  Injection Assessment: negative aspiration for heme, no paresthesia on injection and incremental injection  Patient Tolerance:comfortable throughout block  Complications:no

## 2019-09-04 NOTE — ANESTHESIA PROCEDURE NOTES
Spinal Block      Patient reassessed immediately prior to procedure    Patient location during procedure: pre-op  Start Time: 9/4/2019 12:37 PM  Stop Time: 9/4/2019 12:39 PM  Indication:at surgeon's request and post-op pain management  Performed By  CRNA: Leora Allan CRNA  Preanesthetic Checklist  Completed: patient identified, site marked, surgical consent, pre-op evaluation, timeout performed, IV checked, risks and benefits discussed and monitors and equipment checked  Spinal Block Prep:  Patient Position:sitting  Sterile Tech:cap, gloves, mask and sterile barriers  Prep:Betadine  Patient Monitoring:continuous pulse oximetry, EKG and blood pressure monitoring  Spinal Block Procedure  Approach:midline  Guidance:landmark technique and palpation technique  Location:L3-L4  Needle Type:Destiny  Needle Gauge:24 G  Placement of Spinal needle event:cerebrospinal fluid aspirated  Paresthesia: no  Fluid Appearance:clear  Medications: bupivacaine (MARCAINE) injection 0.5%, 1.6 mL  Med Administered at 9/4/2019 12:39 PM   Post Assessment  Patient Tolerance:patient tolerated the procedure well with no apparent complications  Complications no

## 2019-09-05 VITALS
DIASTOLIC BLOOD PRESSURE: 79 MMHG | HEART RATE: 81 BPM | TEMPERATURE: 98.1 F | HEIGHT: 72 IN | OXYGEN SATURATION: 97 % | RESPIRATION RATE: 16 BRPM | SYSTOLIC BLOOD PRESSURE: 159 MMHG | BODY MASS INDEX: 38.79 KG/M2 | WEIGHT: 286.4 LBS

## 2019-09-05 LAB
ANION GAP SERPL CALCULATED.3IONS-SCNC: 14.1 MMOL/L (ref 5–15)
BASOPHILS # BLD AUTO: 0.03 10*3/MM3 (ref 0–0.2)
BASOPHILS NFR BLD AUTO: 0.3 % (ref 0–1.5)
BUN BLD-MCNC: 19 MG/DL (ref 8–23)
BUN/CREAT SERPL: 15.6 (ref 7–25)
CALCIUM SPEC-SCNC: 9.1 MG/DL (ref 8.6–10.5)
CHLORIDE SERPL-SCNC: 101 MMOL/L (ref 98–107)
CO2 SERPL-SCNC: 23.9 MMOL/L (ref 22–29)
CREAT BLD-MCNC: 1.22 MG/DL (ref 0.76–1.27)
DEPRECATED RDW RBC AUTO: 46.3 FL (ref 37–54)
EOSINOPHIL # BLD AUTO: 0 10*3/MM3 (ref 0–0.4)
EOSINOPHIL NFR BLD AUTO: 0 % (ref 0.3–6.2)
ERYTHROCYTE [DISTWIDTH] IN BLOOD BY AUTOMATED COUNT: 14.5 % (ref 12.3–15.4)
GFR SERPL CREATININE-BSD FRML MDRD: 59 ML/MIN/1.73
GLUCOSE BLD-MCNC: 146 MG/DL (ref 65–99)
HCT VFR BLD AUTO: 38.2 % (ref 37.5–51)
HGB BLD-MCNC: 12.3 G/DL (ref 13–17.7)
IMM GRANULOCYTES # BLD AUTO: 0.05 10*3/MM3 (ref 0–0.05)
IMM GRANULOCYTES NFR BLD AUTO: 0.4 % (ref 0–0.5)
LYMPHOCYTES # BLD AUTO: 1.28 10*3/MM3 (ref 0.7–3.1)
LYMPHOCYTES NFR BLD AUTO: 11 % (ref 19.6–45.3)
MCH RBC QN AUTO: 28.3 PG (ref 26.6–33)
MCHC RBC AUTO-ENTMCNC: 32.2 G/DL (ref 31.5–35.7)
MCV RBC AUTO: 88 FL (ref 79–97)
MONOCYTES # BLD AUTO: 0.64 10*3/MM3 (ref 0.1–0.9)
MONOCYTES NFR BLD AUTO: 5.5 % (ref 5–12)
NEUTROPHILS # BLD AUTO: 9.6 10*3/MM3 (ref 1.7–7)
NEUTROPHILS NFR BLD AUTO: 82.8 % (ref 42.7–76)
NRBC BLD AUTO-RTO: 0 /100 WBC (ref 0–0.2)
PLATELET # BLD AUTO: 238 10*3/MM3 (ref 140–450)
PMV BLD AUTO: 10.8 FL (ref 6–12)
POTASSIUM BLD-SCNC: 4.2 MMOL/L (ref 3.5–5.2)
RBC # BLD AUTO: 4.34 10*6/MM3 (ref 4.14–5.8)
SODIUM BLD-SCNC: 139 MMOL/L (ref 136–145)
WBC NRBC COR # BLD: 11.6 10*3/MM3 (ref 3.4–10.8)

## 2019-09-05 PROCEDURE — 94799 UNLISTED PULMONARY SVC/PX: CPT

## 2019-09-05 PROCEDURE — 85025 COMPLETE CBC W/AUTO DIFF WBC: CPT | Performed by: ORTHOPAEDIC SURGERY

## 2019-09-05 PROCEDURE — G0378 HOSPITAL OBSERVATION PER HR: HCPCS

## 2019-09-05 PROCEDURE — 97165 OT EVAL LOW COMPLEX 30 MIN: CPT

## 2019-09-05 PROCEDURE — 80048 BASIC METABOLIC PNL TOTAL CA: CPT | Performed by: ORTHOPAEDIC SURGERY

## 2019-09-05 PROCEDURE — 97161 PT EVAL LOW COMPLEX 20 MIN: CPT

## 2019-09-05 RX ORDER — ONDANSETRON 4 MG/1
4 TABLET, FILM COATED ORAL EVERY 8 HOURS PRN
Qty: 20 TABLET | Refills: 0 | Status: ON HOLD | OUTPATIENT
Start: 2019-09-05 | End: 2019-11-11

## 2019-09-05 RX ORDER — DOCUSATE SODIUM 100 MG/1
100 CAPSULE, LIQUID FILLED ORAL 2 TIMES DAILY PRN
Qty: 60 CAPSULE | Refills: 0 | Status: ON HOLD | OUTPATIENT
Start: 2019-09-05 | End: 2019-11-11

## 2019-09-05 RX ORDER — THIAMINE HCL 100 MG
500 TABLET ORAL
Qty: 30 TABLET | Refills: 0 | Status: ON HOLD | OUTPATIENT
Start: 2019-09-05 | End: 2019-11-11

## 2019-09-05 RX ORDER — OXYCODONE HYDROCHLORIDE AND ACETAMINOPHEN 5; 325 MG/1; MG/1
TABLET ORAL
Qty: 36 TABLET | Refills: 0 | Status: ON HOLD | OUTPATIENT
Start: 2019-09-05 | End: 2019-11-11

## 2019-09-05 RX ORDER — CHOLECALCIFEROL (VITAMIN D3) 125 MCG
5 CAPSULE ORAL NIGHTLY
Qty: 30 TABLET | Refills: 0 | Status: SHIPPED | OUTPATIENT
Start: 2019-09-05 | End: 2019-12-03 | Stop reason: ALTCHOICE

## 2019-09-05 RX ADMIN — METOPROLOL SUCCINATE 25 MG: 25 TABLET, EXTENDED RELEASE ORAL at 08:37

## 2019-09-05 RX ADMIN — APIXABAN 2.5 MG: 2.5 TABLET, FILM COATED ORAL at 08:37

## 2019-09-05 RX ADMIN — PREGABALIN 75 MG: 75 CAPSULE ORAL at 08:37

## 2019-09-05 RX ADMIN — OXYCODONE HYDROCHLORIDE 10 MG: 5 TABLET ORAL at 09:17

## 2019-09-05 RX ADMIN — ASPIRIN 81 MG 81 MG: 81 TABLET ORAL at 08:37

## 2019-09-05 RX ADMIN — OXYCODONE HYDROCHLORIDE 10 MG: 5 TABLET ORAL at 05:03

## 2019-09-05 RX ADMIN — CETIRIZINE HYDROCHLORIDE 5 MG: 10 TABLET, FILM COATED ORAL at 08:37

## 2019-09-05 RX ADMIN — FENOFIBRATE 145 MG: 145 TABLET, FILM COATED ORAL at 08:37

## 2019-09-06 ENCOUNTER — READMISSION MANAGEMENT (OUTPATIENT)
Dept: CALL CENTER | Facility: HOSPITAL | Age: 69
End: 2019-09-06

## 2019-09-06 ENCOUNTER — HOSPITAL ENCOUNTER (OUTPATIENT)
Dept: PHYSICAL THERAPY | Facility: HOSPITAL | Age: 69
Setting detail: THERAPIES SERIES
Discharge: HOME | End: 2019-09-06

## 2019-09-06 DIAGNOSIS — Z96.651 STATUS POST TOTAL RIGHT KNEE REPLACEMENT: Primary | ICD-10-CM

## 2019-09-06 PROCEDURE — 97161 PT EVAL LOW COMPLEX 20 MIN: CPT | Performed by: PHYSICAL THERAPIST

## 2019-09-06 NOTE — OUTREACH NOTE
Prep Survey      Responses   Facility patient discharged from?  [facility]   Is LACE score < 7 ?  Yes   Is patient eligible?  Yes   Discharge diagnosis  Primary osteoarthritis of right knee, s/p total knee arthroplasty   Does the patient have one of the following disease processes/diagnoses(primary or secondary)?  Total Joint Replacement [LACE <7]   Does the patient have Home health ordered?  No   Is there a DME ordered?  Yes   What DME was ordered?  [facility] and rolling walker per [facility]   Comments regarding appointments  Pt to schedule follow up with PCP   Prep survey completed?  Yes          Letty Petersen RN        
negative...

## 2019-09-06 NOTE — THERAPY EVALUATION
Outpatient Physical Therapy Ortho Initial Evaluation   Linda Holm     Patient Name: Jefry Sabillon  : 1950  MRN: [id number]  Today's Date: 2019      Visit Date: 2019    Patient Active Problem List   Diagnosis   • Primary osteoarthritis of knees, bilateral   • Acute dyspnea   • Pericarditis   • Essential hypertension   • Mixed hyperlipidemia   • Primary osteoarthritis of right knee   • S/P total knee arthroplasty        Past Medical History:   Diagnosis Date   • Arthritis    • Asthma    • Cardiac disease    • Coronary artery disease    • Diabetes mellitus (CMS/[facility])     take Metformin   • Heart disease    • Hypertension    • Knee sprain    • Mini stroke (CMS/[facility])    • EVE (obstructive sleep apnea)    • Rotator cuff syndrome         Past Surgical History:   Procedure Laterality Date   • HERNIA REPAIR     • KNEE SURGERY  1970   • TOTAL KNEE ARTHROPLASTY Right 2019    Procedure: TOTAL KNEE ARTHROPLASTY AND ALL ASSOCIATED PROCEDURES;  Surgeon: Lopez Mejias MD;  Location: [facility];  Service: Orthopedics       Visit Dx:     ICD-10-CM ICD-9-CM   1. Status post total right knee replacement Z96.651 V43.65                                                                         Time Calculation:           Therapy Charges for Today     Code Description Service Date Service Provider Modifiers Qty    [id number] HC PT EVAL LOW COMPLEXITY 2 2019 Jose Adam, PT GP 1                    Jose Adam, PT  2019

## 2019-09-09 ENCOUNTER — HOSPITAL ENCOUNTER (OUTPATIENT)
Dept: PHYSICAL THERAPY | Facility: HOSPITAL | Age: 69
Setting detail: THERAPIES SERIES
Discharge: HOME | End: 2019-09-09

## 2019-09-09 ENCOUNTER — READMISSION MANAGEMENT (OUTPATIENT)
Dept: CALL CENTER | Facility: HOSPITAL | Age: 69
End: 2019-09-09

## 2019-09-09 DIAGNOSIS — Z96.651 STATUS POST TOTAL RIGHT KNEE REPLACEMENT: Primary | ICD-10-CM

## 2019-09-09 PROCEDURE — 97110 THERAPEUTIC EXERCISES: CPT | Performed by: PHYSICAL THERAPIST

## 2019-09-09 NOTE — OUTREACH NOTE
Total Joint Week 1 Survey      Responses   Facility patient discharged from?  [place]   Does the patient have one of the following disease processes/diagnoses(primary or secondary)?  Total Joint Replacement   Is there a successful TCM telephone encounter documented?  No   Joint surgery performed?  Knee   Week 1 attempt successful?  Yes   Call start time  1510   Call end time  1515   Has the patient been back in either the hospital or Emergency Department since discharge?  No   Discharge diagnosis  Primary osteoarthritis of right knee, s/p total knee arthroplasty   Does the patient have all medications related to this admission filled (includes all antibiotics, pain medications, etc.)  Yes   Is the patient taking all medications as directed (includes completed medication regime)?  Yes   Is the patient able to teach back alternate methods of pain control?  Ice, Knee-elevation/no pillow under knee, Reposition, Short, frequent activity   Medication comments  Encouraged fresh fruit for aid against constipation.   Does the patient have a follow up appointment with their surgeon?  Yes   Has the patient kept scheduled appointments due by today?  N/A   Has home health visited the patient within 72 hours of discharge?  N/A   What DME was ordered?  BSC and rolling walker per [employer]   Psychosocial issues?  No   When is the first therapy visit scheduled (PO Day) including how many days per week   Walked Day 1 and then therapy day 1 and MWF now.   Has the patient began therapy sessions (either in the home or as an out patient)?  Yes   Does the patient have a wound vac in place?  N/A   Has the patient fallen since discharge?  No   Did the patient receive a copy of their discharge instructions?  Yes   Nursing interventions  Reviewed instructions with patient   What is the patient's perception of their functional status since discharge?  Improving   Is the patient able to teach back signs and symptoms of infection?  Temp >100.4  for 24h or longer, Incisional drainage, Increased swelling or redness around incision (not associated with surgical edema), Shortness of breath or chest pain   Is the patient able to teach back how to prevent infection?  No lotion or creams, Eat well-balanced diet, Check incision daily, Wash hands before and after touching incision, Keep incision covered if drainage   Is the patient able to teach back signs and symptoms of DVT?  Redness in calf, Severe pain in calf, Swelling in calf, Shortness of breath or chest pain, Area hot to touch   Did the patient implement home safety suggestions from pre-surgery classes if attended?  Yes   Is the patient/caregiver able to teach back the hierarchy of who to call/visit for symptoms/problems? PCP, Specialist, Home health nurse, Urgent Care, ED, 911  Yes   Additional teach back comments  He says he is doing well and having minimal swelling and no drainage.  Using Percocet at night only.   Week 1 call completed?  Yes          Sondra Diaz RN

## 2019-09-09 NOTE — THERAPY EVALUATION
Outpatient Physical Therapy Ortho Initial Evaluation   Linda Holm     Patient Name: Jefry Sabillon  : 1950  MRN: [id number]  Today's Date: 2019      Visit Date: 2019    Patient Active Problem List   Diagnosis   • Primary osteoarthritis of knees, bilateral   • Acute dyspnea   • Pericarditis   • Essential hypertension   • Mixed hyperlipidemia   • Primary osteoarthritis of right knee   • S/P total knee arthroplasty        Past Medical History:   Diagnosis Date   • Arthritis    • Asthma    • Cardiac disease    • Coronary artery disease    • Diabetes mellitus (CMS/[facility])     take Metformin   • Heart disease    • Hypertension    • Knee sprain    • Mini stroke (CMS/[facility])    • EVE (obstructive sleep apnea)    • Rotator cuff syndrome         Past Surgical History:   Procedure Laterality Date   • HERNIA REPAIR     • KNEE SURGERY  1970   • TOTAL KNEE ARTHROPLASTY Right 2019    Procedure: TOTAL KNEE ARTHROPLASTY AND ALL ASSOCIATED PROCEDURES;  Surgeon: Lopez Mejias MD;  Location: [facility];  Service: Orthopedics       Visit Dx:     ICD-10-CM ICD-9-CM   1. Status post total right knee replacement Z96.651 V43.65         Patient History     Row Name 19 0400             History    Chief Complaint  Difficulty Walking;Difficulty with daily activities;Pain;Swelling;Tightness;Muscle weakness  -      Type of Pain  Knee pain right  -      Date Current Problem(s) Began  19  -      Brief Description of Current Complaint  Pt reports an approximate 10-15 year history of right knee pain. He has received multiple injections with temporary relief only. He underwent a right TKA on . He was hospitalized overnight before being discharged home on . He is now referred for outpatient therapy.  -      Patient/Caregiver Goals  Relieve pain;Return to prior level of function;Improve mobility;Improve strength;Decrease swelling  -      Patient's Rating of General Health  Good  -      Hand  Dominance  right-handed  -GC      Occupation/sports/leisure activities  Pt assists the intellectually disabled  -      What clinical tests have you had for this problem?  X-ray  -      Results of Clinical Tests  bone on bone as reported by patient  -         Pain     Pain Location  Knee right  -GC      Pain at Present  7  -GC      Pain at Best  5  -GC      Pain at Worst  9  -GC      Pain Frequency  Constant/continuous  -GC      What Performance Factors Make the Current Problem(s) WORSE?  Pt c/o pain with bending and straightening his knee, being on his feet, walking  -GC      What Performance Factors Make the Current Problem(s) BETTER?  Pt feels best if he gets off his feet and rests with his knee slightly bent  -GC      Difficulties at work?  Pt is currently not working  -GC      Difficulties with ADL's?  Pt has difficulty don/doff shoes and socks, rising from low seat heights, getting in/out of car, getting in/out of tub  -         Daily Activities    Primary Language  English  -      How does patient learn best?  Listening  -      Teaching needs identified  Home Exercise Program;Management of Condition  -GC      Patient is concerned about/has problems with  Climbing Stairs;Difficulty with self care (i.e. bathing, dressing, toileting:;Flexibility;Performing home management (household chores, shopping, care of dependents);Performing job responsibilities/community activities (work, school,;Performing sports, recreation, and play activities;Standing;Transfers (getting out of a chair, bed);Walking  -GC      Does patient have problems with the following?  None  -      Barriers to learning  None  -      Functional Status  bathing;dressing;grooming;mobility issues preventing performance of daily activities  -      Pt Participated in POC and Goals  Yes  -         Safety    Are you being hurt, hit, or frightened by anyone at home or in your life?  No  -GC      Are you being neglected by a caregiver  No   -GC        User Key  (r) = Recorded By, (t) = Taken By, (c) = Cosigned By    Initials Name Provider Type    GC Jose Adam, PT Physical Therapist          PT Ortho     Row Name 09/09/19 0400       Posture/Observations    Posture/Observations Comments  Pt arrived to therapy in a wheelchair. He still has pain pump right LE. He has moderate edema right knee. Surgical site is healing nicely.  -GC       Knee Palpation    Patella  Right:;Tender  -GC    Patella Tendon  Right:;Tender  -GC    Medial Joint Line  Right:;Tender  -GC    Lateral Joint Line  Right:;Tender  -GC       Patellar Accessory Motions    Superior glide  Right:;Hypomobile  -GC    Inferior glide  Right:;Hypomobile  -GC    Medial glide  Right:;Hypomobile  -GC    Lateral glide  Right:;Hypomobile  -GC       Knee Special Tests    Bo’s sign (DVT)  Right:;Negative  -GC       Right Lower Ext    Rt Knee Extension/Flexion AROM  0-18-53 degrees  -GC       MMT Right Lower Ext    Rt Hip Flexion MMT, Gross Movement  (3/5) fair  -GC    Rt Hip ABduction MMT, Gross Movement  (3/5) fair  -GC    Rt Hip ADduction MMT, Gross Movement  (3/5) fair  -GC    Rt Knee Extension MMT, Gross Movement  (3/5) fair  -GC    Rt Knee Flexion MMT, Gross Movement  (3+/5) fair plus  -GC    Rt Ankle Plantarflexion MMT, Gross Movement  (4/5) good  -GC    Rt Ankle Dorsiflexion MMT, Gross Movement  (4+/5) good plus  -GC       Sensation    Light Touch  No apparent deficits  -GC       Lower Extremity Flexibility    Hamstrings  Right:;Moderately limited  -GC    Quadriceps  Right:;Severely limited  -GC    Gastrocnemius  Right:;Moderately limited  -GC       Transfers    Comment (Transfers)  Pt requires assistance with his right LE when going sit to/from supine and when rolling side to side  -GC       Gait/Stairs Assessment/Training    Comment (Gait/Stairs)  Pt ambulates PWBing on right LE using a front wheeled walker  -      User Key  (r) = Recorded By, (t) = Taken By, (c) = Cosigned By     Initials Name Provider Type     Jose Adam PT Physical Therapist                      Therapy Education  Given: HEP, Symptoms/condition management, Pain management  Program: New  How Provided: Verbal, Demonstration, Written  Provided to: Patient, Caregiver  Level of Understanding: Teach back education performed, Verbalized, Demonstrated     PT OP Goals     Row Name 09/09/19 0400          PT Short Term Goals    STG Date to Achieve  09/27/19  -GC     STG 1  Decrease right knee pain to 3-4/10 with activity.  -GC     STG 2  Increase right knee AROM to 0-5-110 degrees with testing.  -GC     STG 3  Increase right LE strength to at least 4/5 all planes with testing.  -GC     STG 4  Pt will be independent with his HEP issued by this therapist.  -        Long Term Goals    LTG Date to Achieve  10/18/19  -GC     LTG 1  Decrease right knee pain to 0-1/10 with activity.  -GC     LTG 2  Increase right knee AROM to 0-125 degrees with testing.  -GC     LTG 3  Increase right LE strength to at least 4+/5 all planes with testing.  -GC     LTG 4  Pt will ambulate normally on levels and stairs.  -GC     LTG 5  Pt will be independent with all ADLs and have a LEFS score > 65.  -        Time Calculation    PT Goal Re-Cert Due Date  10/04/19  -       User Key  (r) = Recorded By, (t) = Taken By, (c) = Cosigned By    Initials Name Provider Type     Jose Adam PT Physical Therapist          PT Assessment/Plan     Row Name 09/09/19 0400          PT Assessment    Functional Limitations  Impaired gait;Limitation in home management;Limitations in community activities;Limitations in functional capacity and performance;Performance in leisure activities;Performance in self-care ADL;Performance in work activities  -     Impairments  Edema;Gait;Impaired flexibility;Range of motion;Pain;Muscle strength;Joint mobility  -     Assessment Comments  Pt presents 3 days s/p right TKA. He has pain rated up to 9/10 with activity. He has  decreased patella mobility, decreased right knee ROM, decreased right LE strength, decreased ambulatory status, and decreased function secondary to the above.  -GC     Rehab Potential  Good  -GC     Patient/caregiver participated in establishment of treatment plan and goals  Yes  -GC     Patient would benefit from skilled therapy intervention  Yes  -GC        PT Plan    PT Frequency  2x/week;3x/week  -GC     Predicted Duration of Therapy Intervention (Therapy Eval)  6 weeks  -GC     Planned CPT's?  PT EVAL LOW COMPLEXITY: 88649;PT THER PROC EA 15 MIN: 59525;PT MANUAL THERAPY EA 15 MIN: 71399;PT HOT OR COLD PACK TREAT MCARE;PT ELECTRICAL STIM UNATTEND:   -GC     PT Plan Comments  Pt is to continue his HEP 2x daily.  -GC       User Key  (r) = Recorded By, (t) = Taken By, (c) = Cosigned By    Initials Name Provider Type    GC Jose Adam, PT Physical Therapist            OP Exercises     Row Name 09/09/19 0400             Exercise 1    Exercise Name 1  patella mobilizations  -GC      Cueing 1  Tactile  -GC      Time 1  5 min  -GC         Exercise 2    Exercise Name 2  Heel slides  -GC      Cueing 2  Verbal;Tactile  -GC      Time 2  6 min  -GC         Exercise 3    Exercise Name 3  Hamstring/gastroc stretches  -GC      Cueing 3  Verbal;Tactile  -GC      Time 3  5 min  -GC         Exercise 4    Exercise Name 4  Supine knee extension on roll  -GC      Cueing 4  Verbal;Tactile  -GC      Reps 4  5 min  -GC         Exercise 5    Exercise Name 5  Passive knee flexion stretch in sitting  -GC      Cueing 5  Verbal;Tactile  -GC      Reps 5  10  -GC      Time 5  10 secs  -GC         Exercise 6    Exercise Name 6  Passive knee extension stretch  -GC      Cueing 6  Verbal;Tactile  -GC      Reps 6  10  -GC      Time 6  10 secs  -GC         Exercise 7    Exercise Name 7  SLR  -GC      Cueing 7  Verbal;Tactile  -GC      Reps 7  20  -GC         Exercise 8    Exercise Name 8  Hip ABD  -GC      Cueing 8  Verbal;Tactile  -GC       Reps 8  20  -GC         Exercise 9    Exercise Name 9  PF vs theraband  -GC      Cueing 9  Verbal;Tactile  -GC      Reps 9  20  -GC      Time 9  silver  -GC        User Key  (r) = Recorded By, (t) = Taken By, (c) = Cosigned By    Initials Name Provider Type    Jose Hilliard PT Physical Therapist                        Outcome Measure Options: Lower Extremity Functional Scale (LEFS)  Lower Extremity Functional Index  Any of your usual work, housework or school activities: Extreme difficulty or unable to perform activity  Your usual hobbies, recreational or sporting activities: Extreme difficulty or unable to perform activity  Getting into or out of the bath: Quite a bit of difficulty  Walking between rooms: Moderate difficulty  Putting on your shoes or socks: Moderate difficulty  Squatting: Extreme difficulty or unable to perform activity  Lifting an object, like a bag of groceries from the floor: Quite a bit of difficulty  Performing light activities around your home: Extreme difficulty or unable to perform activity  Performing heavy activities around your home: Extreme difficulty or unable to perform activity  Getting into or out of a car: Quite a bit of difficulty  Walking 2 blocks: Extreme difficulty or unable to perform activity  Walking a mile: Extreme difficulty or unable to perform activity  Going up or down 10 stairs (about 1 flight of stairs): Extreme difficulty or unable to perform activity  Standing for 1 hour: Extreme difficulty or unable to perform activity  Sitting for 1 hour: A little bit of difficulty  Running on even ground: Extreme difficulty or unable to perform activity  Running on uneven ground: Extreme difficulty or unable to perform activity  Making sharp turns while running fast: Extreme difficulty or unable to perform activity  Hopping: Extreme difficulty or unable to perform activity  Rolling over in bed: Extreme difficulty or unable to perform activity  Total: 10      Time  Calculation:     Start Time: 1055  Stop Time: 1203  Time Calculation (min): 68 min         PT G-Codes  Outcome Measure Options: Lower Extremity Functional Scale (LEFS)  Total: 10         Jose Adam, PT  9/9/2019

## 2019-09-09 NOTE — THERAPY TREATMENT NOTE
Outpatient Physical Therapy Ortho Treatment Note   [facility]     Patient Name: Jefry Sabillon  : 1950  MRN: [id number]  Today's Date: 2019      Visit Date: 2019    Visit Dx:    ICD-10-CM ICD-9-CM   1. Status post total right knee replacement Z96.651 V43.65       Patient Active Problem List   Diagnosis   • Primary osteoarthritis of knees, bilateral   • Acute dyspnea   • Pericarditis   • Essential hypertension   • Mixed hyperlipidemia   • Primary osteoarthritis of right knee   • S/P total knee arthroplasty        Past Medical History:   Diagnosis Date   • Arthritis    • Asthma    • Cardiac disease    • Coronary artery disease    • Diabetes mellitus (CMS/HCC)     take Metformin   • Heart disease    • Hypertension    • Knee sprain    • Mini stroke (CMS/HCC)    • EVE (obstructive sleep apnea)    • Rotator cuff syndrome         Past Surgical History:   Procedure Laterality Date   • HERNIA REPAIR     • KNEE SURGERY  1970   • TOTAL KNEE ARTHROPLASTY Right 2019    Procedure: TOTAL KNEE ARTHROPLASTY AND ALL ASSOCIATED PROCEDURES;  Surgeon: Lopez Mejias MD;  Location: [facility];  Service: Orthopedics       PT Ortho     Row Name 19 1045       Subjective Comments    Subjective Comments  Pt states his knee is still very sore and painful.  -GC       Subjective Pain    Able to rate subjective pain?  yes  -GC    Pre-Treatment Pain Level  5  -GC    Row Name 19 0400       Posture/Observations    Posture/Observations Comments  Pt arrived to therapy in a wheelchair. He still has pain pump right LE. He has moderate edema right knee. Surgical site is healing nicely.  -GC       Knee Palpation    Patella  Right:;Tender  -GC    Patella Tendon  Right:;Tender  -GC    Medial Joint Line  Right:;Tender  -GC    Lateral Joint Line  Right:;Tender  -GC       Patellar Accessory Motions    Superior glide  Right:;Hypomobile  -GC    Inferior glide  Right:;Hypomobile  -GC    Medial glide  Right:;Hypomobile   -    Lateral glide  Right:;Hypomobile  -       Knee Special Tests    Bo’s sign (DVT)  Right:;Negative  -GC       Right Lower Ext    Rt Knee Extension/Flexion AROM  0-18-53 degrees  -       MMT Right Lower Ext    Rt Hip Flexion MMT, Gross Movement  (3/5) fair  -GC    Rt Hip ABduction MMT, Gross Movement  (3/5) fair  -GC    Rt Hip ADduction MMT, Gross Movement  (3/5) fair  -GC    Rt Knee Extension MMT, Gross Movement  (3/5) fair  -GC    Rt Knee Flexion MMT, Gross Movement  (3+/5) fair plus  -GC    Rt Ankle Plantarflexion MMT, Gross Movement  (4/5) good  -GC    Rt Ankle Dorsiflexion MMT, Gross Movement  (4+/5) good plus  -GC       Sensation    Light Touch  No apparent deficits  -       Lower Extremity Flexibility    Hamstrings  Right:;Moderately limited  -GC    Quadriceps  Right:;Severely limited  -GC    Gastrocnemius  Right:;Moderately limited  -GC       Transfers    Comment (Transfers)  Pt requires assistance with his right LE when going sit to/from supine and when rolling side to side  -       Gait/Stairs Assessment/Training    Comment (Gait/Stairs)  Pt ambulates PWBing on right LE using a front wheeled walker  -      User Key  (r) = Recorded By, (t) = Taken By, (c) = Cosigned By    Initials Name Provider Type     Jose Adam, VEL Physical Therapist                      PT Assessment/Plan     Row Name 09/09/19 1045 09/09/19 0400       PT Assessment    Functional Limitations  --  Impaired gait;Limitation in home management;Limitations in community activities;Limitations in functional capacity and performance;Performance in leisure activities;Performance in self-care ADL;Performance in work activities  -    Impairments  --  Edema;Gait;Impaired flexibility;Range of motion;Pain;Muscle strength;Joint mobility  -    Assessment Comments  Pt tolerated treatment well today.  -  Pt presents 3 days s/p right TKA. He has pain rated up to 9/10 with activity. He has decreased patella mobility, decreased  right knee ROM, decreased right LE strength, decreased ambulatory status, and decreased function secondary to the above.  -GC    Rehab Potential  --  Good  -GC    Patient/caregiver participated in establishment of treatment plan and goals  --  Yes  -GC    Patient would benefit from skilled therapy intervention  --  Yes  -GC       PT Plan    PT Frequency  --  2x/week;3x/week  -    Predicted Duration of Therapy Intervention (Therapy Eval)  --  6 weeks  -GC    Planned CPT's?  --  PT EVAL LOW COMPLEXITY: 44509;PT THER PROC EA 15 MIN: 27696;PT MANUAL THERAPY EA 15 MIN: 35929;PT HOT OR COLD PACK TREAT MCARE;PT ELECTRICAL STIM UNATTEND:   -    PT Plan Comments  Pt is to continue his hEP 2x daily wiht hterapy 3x weekly.  -GC  Pt is to continue his HEP 2x daily.  -      User Key  (r) = Recorded By, (t) = Taken By, (c) = Cosigned By    Initials Name Provider Type     Jose Adam, PT Physical Therapist            OP Exercises     Row Name 09/09/19 1045 09/09/19 0400          Subjective Comments    Subjective Comments  Pt states his knee is still very sore and painful.  -GC  --        Subjective Pain    Able to rate subjective pain?  yes  -GC  --     Pre-Treatment Pain Level  5  -GC  --        Exercise 1    Exercise Name 1  patella mobilizations  -GC  patella mobilizations  -GC     Cueing 1  Tactile  -GC  Tactile  -GC     Time 1  5 min  -GC  5 min  -GC        Exercise 2    Exercise Name 2  Heel slides  -GC  Heel slides  -GC     Cueing 2  Verbal;Tactile  -GC  Verbal;Tactile  -GC     Time 2  8 min  -GC  6 min  -GC        Exercise 3    Exercise Name 3  Wall Slides  -GC  Hamstring/gastroc stretches  -GC     Cueing 3  Verbal;Tactile  -GC  Verbal;Tactile  -GC     Time 3  8 min  -GC  5 min  -GC        Exercise 4    Exercise Name 4  Passive knee flexion stretch  -GC  Supine knee extension on roll  -     Cueing 4  Verbal;Tactile  -GC  Verbal;Tactile  -GC     Reps 4  10  -GC  5 min  -GC     Time 4  10 secs  -GC  --         Exercise 5    Exercise Name 5  Hamstring/gastroc stretch  -GC  Passive knee flexion stretch in sitting  -GC     Cueing 5  Verbal;Tactile  -GC  Verbal;Tactile  -GC     Reps 5  --  10  -GC     Time 5  5 min  -GC  10 secs  -GC        Exercise 6    Exercise Name 6  Supine knee extension on roll  -GC  Passive knee extension stretch  -GC     Cueing 6  Verbal  -GC  Verbal;Tactile  -GC     Reps 6  --  10  -GC     Time 6  5 min  -GC  10 secs  -GC        Exercise 7    Exercise Name 7  Passive knee extension stretches  -GC  SLR  -GC     Cueing 7  Verbal;Tactile  -GC  Verbal;Tactile  -GC     Reps 7  10   -GC  20  -GC        Exercise 8    Exercise Name 8  QS with Russian Stim  -GC  Hip ABD  -GC     Cueing 8  Verbal;Tactile  -GC  Verbal;Tactile  -GC     Reps 8  --  20  -GC     Time 8  10 min 10/10  -GC  --        Exercise 9    Exercise Name 9  SLR  -GC  PF vs theraband  -GC     Cueing 9  Verbal;Tactile  -GC  Verbal;Tactile  -GC     Reps 9  20  -GC  20  -GC     Time 9  --  silver  -GC        Exercise 10    Exercise Name 10  Hip ABD  -GC  --     Cueing 10  Verbal;Tactile  -GC  --     Reps 10  20  -GC  --        Exercise 11    Exercise Name 11  SAQ  -GC  --     Cueing 11  Verbal;Tactile  -GC  --     Reps 11  20  -GC  --        Exercise 12    Exercise Name 12  PF vs theraband   -GC  --     Cueing 12  Verbal;Tactile  -GC  --     Reps 12  20  -GC  --     Time 12  silver  -GC  --       User Key  (r) = Recorded By, (t) = Taken By, (c) = Cosigned By    Initials Name Provider Type     Jose Adam, PT Physical Therapist                       PT OP Goals     Row Name 09/09/19 0400          PT Short Term Goals    STG Date to Achieve  09/27/19  -     STG 1  Decrease right knee pain to 3-4/10 with activity.  -     STG 2  Increase right knee AROM to 0-5-110 degrees with testing.  -     STG 3  Increase right LE strength to at least 4/5 all planes with testing.  -     STG 4  Pt will be independent with his HEP issued by this  therapist.  -        Long Term Goals    LTG Date to Achieve  10/18/19  -     LTG 1  Decrease right knee pain to 0-1/10 with activity.  -     LTG 2  Increase right knee AROM to 0-125 degrees with testing.  -     LTG 3  Increase right LE strength to at least 4+/5 all planes with testing.  -     LTG 4  Pt will ambulate normally on levels and stairs.  -     LTG 5  Pt will be independent with all ADLs and have a LEFS score > 65.  -        Time Calculation    PT Goal Re-Cert Due Date  10/04/19  -       User Key  (r) = Recorded By, (t) = Taken By, (c) = Cosigned By    Initials Name Provider Type     Jose Adma, PT Physical Therapist                         Time Calculation:   Start Time: 1045  Stop Time: 1207  Time Calculation (min): 82 min  Therapy Charges for Today     Code Description Service Date Service Provider Modifiers Qty    52926684385 HC PT THER PROC EA 15 MIN 9/9/2019 Jose Adam, PT GP 2                    Jose Adam, PT  9/9/2019

## 2019-09-11 ENCOUNTER — HOSPITAL ENCOUNTER (OUTPATIENT)
Dept: PHYSICAL THERAPY | Facility: HOSPITAL | Age: 69
Setting detail: THERAPIES SERIES
Discharge: HOME | End: 2019-09-11

## 2019-09-11 DIAGNOSIS — Z96.651 STATUS POST TOTAL RIGHT KNEE REPLACEMENT: Primary | ICD-10-CM

## 2019-09-11 PROCEDURE — 97110 THERAPEUTIC EXERCISES: CPT | Performed by: PHYSICAL THERAPIST

## 2019-09-11 PROCEDURE — 97140 MANUAL THERAPY 1/> REGIONS: CPT | Performed by: PHYSICAL THERAPIST

## 2019-09-11 NOTE — THERAPY TREATMENT NOTE
Outpatient Physical Therapy Ortho Treatment Note   [place]     Patient Name: Jefry Sabillon  : 1950  MRN: [id number]  Today's Date: 2019      Visit Date: 2019    Visit Dx:    ICD-10-CM ICD-9-CM   1. Status post total right knee replacement Z96.651 V43.65       Patient Active Problem List   Diagnosis   • Primary osteoarthritis of knees, bilateral   • Acute dyspnea   • Pericarditis   • Essential hypertension   • Mixed hyperlipidemia   • Primary osteoarthritis of right knee   • S/P total knee arthroplasty        Past Medical History:   Diagnosis Date   • Arthritis    • Asthma    • Cardiac disease    • Coronary artery disease    • Diabetes mellitus (CMS/HCC)     take Metformin   • Heart disease    • Hypertension    • Knee sprain    • Mini stroke (CMS/HCC)    • EVE (obstructive sleep apnea)    • Rotator cuff syndrome         Past Surgical History:   Procedure Laterality Date   • HERNIA REPAIR     • KNEE SURGERY     • TOTAL KNEE ARTHROPLASTY Right 2019    Procedure: TOTAL KNEE ARTHROPLASTY AND ALL ASSOCIATED PROCEDURES;  Surgeon: Lopez Mejias MD;  Location: [facility];  Service: Orthopedics       PT Ortho     Row Name 19 1611       Subjective Comments    Subjective Comments  Patient reports that he was hurting following last visit but is feeling better today.  Reports that his knee feels a little more mobile today.    -SP       Right Lower Ext    Rt Knee Extension/Flexion AROM  0-13-60 degrees  -SP    Row Name 19 1045       Subjective Comments    Subjective Comments  Pt states his knee is still very sore and painful.  -GC       Subjective Pain    Able to rate subjective pain?  yes  -    Pre-Treatment Pain Level  5  -GC    Row Name 19 0400       Posture/Observations    Posture/Observations Comments  Pt arrived to therapy in a wheelchair. He still has pain pump right LE. He has moderate edema right knee. Surgical site is healing nicely.  -GC       Knee Palpation     Patella  Right:;Tender  -GC    Patella Tendon  Right:;Tender  -GC    Medial Joint Line  Right:;Tender  -GC    Lateral Joint Line  Right:;Tender  -GC       Patellar Accessory Motions    Superior glide  Right:;Hypomobile  -GC    Inferior glide  Right:;Hypomobile  -GC    Medial glide  Right:;Hypomobile  -GC    Lateral glide  Right:;Hypomobile  -GC       Knee Special Tests    Bo’s sign (DVT)  Right:;Negative  -GC       Right Lower Ext    Rt Knee Extension/Flexion AROM  0-18-53 degrees  -GC       MMT Right Lower Ext    Rt Hip Flexion MMT, Gross Movement  (3/5) fair  -GC    Rt Hip ABduction MMT, Gross Movement  (3/5) fair  -GC    Rt Hip ADduction MMT, Gross Movement  (3/5) fair  -GC    Rt Knee Extension MMT, Gross Movement  (3/5) fair  -GC    Rt Knee Flexion MMT, Gross Movement  (3+/5) fair plus  -GC    Rt Ankle Plantarflexion MMT, Gross Movement  (4/5) good  -GC    Rt Ankle Dorsiflexion MMT, Gross Movement  (4+/5) good plus  -GC       Sensation    Light Touch  No apparent deficits  -GC       Lower Extremity Flexibility    Hamstrings  Right:;Moderately limited  -GC    Quadriceps  Right:;Severely limited  -GC    Gastrocnemius  Right:;Moderately limited  -GC       Transfers    Comment (Transfers)  Pt requires assistance with his right LE when going sit to/from supine and when rolling side to side  -GC       Gait/Stairs Assessment/Training    Comment (Gait/Stairs)  Pt ambulates PWBing on right LE using a front wheeled walker  -      User Key  (r) = Recorded By, (t) = Taken By, (c) = Cosigned By    Initials Name Provider Type    Janee Crooks, PT Physical Therapist    GC Jose Adam, PT Physical Therapist                      PT Assessment/Plan     Row Name 09/11/19 7827          PT Assessment    Assessment Comments  Patient demonstrates slowly progressing right knee ROM.    -SP        PT Plan    PT Plan Comments  Continue per POC  -SP       User Key  (r) = Recorded By, (t) = Taken By, (c) = Cosigned  By    Initials Name Provider Type    Janee Crooks, PT Physical Therapist            OP Exercises     Row Name 09/11/19 1611             Subjective Comments    Subjective Comments  Patient reports that he was hurting following last visit but is feeling better today.  Reports that his knee feels a little more mobile today.    -SP         Exercise 1    Exercise Name 1  patella mobilizations  -SP      Cueing 1  Tactile  -SP      Time 1  5 min  -SP         Exercise 2    Exercise Name 2  Heel slides  -SP      Cueing 2  Verbal;Tactile  -SP      Time 2  8 min  -SP         Exercise 3    Exercise Name 3  Wall Slides  -SP      Cueing 3  Verbal;Tactile  -SP      Time 3  8 min  -SP         Exercise 4    Exercise Name 4  Passive knee flexion stretch  -SP      Cueing 4  Verbal;Tactile  -SP      Reps 4  10  -SP      Time 4  10 secs  -SP         Exercise 5    Exercise Name 5  Hamstring/gastroc stretch  -SP      Cueing 5  Verbal;Tactile  -SP      Time 5  5 min  -SP         Exercise 6    Exercise Name 6  Supine knee extension on roll  -SP      Cueing 6  Verbal  -SP      Time 6  5 min  -SP         Exercise 7    Exercise Name 7  Passive knee extension stretches  -SP      Cueing 7  Verbal;Tactile  -SP      Reps 7  10   -SP      Time 7  10 sec  -SP         Exercise 8    Exercise Name 8  QS with Russian Stim  -SP      Cueing 8  Verbal;Tactile  -SP      Time 8  10 min 10/10  -SP         Exercise 9    Exercise Name 9  SLR  -SP      Cueing 9  Verbal;Tactile  -SP      Reps 9  20  -SP         Exercise 10    Exercise Name 10  Hip ABD  -SP      Cueing 10  Verbal;Tactile  -SP      Reps 10  20  -SP         Exercise 11    Exercise Name 11  SAQ  -SP      Cueing 11  Verbal;Tactile  -SP      Reps 11  20  -SP         Exercise 12    Exercise Name 12  PF vs theraband   -SP      Cueing 12  Verbal;Tactile  -SP      Reps 12  20  -SP      Time 12  silver  -SP        User Key  (r) = Recorded By, (t) = Taken By, (c) = Cosigned By    Initials Name  Provider Type    SP Janee Pierce, PT Physical Therapist                           Therapy Education  Education Details: Patient instructed to try short sit with right knee in dependent position for passive knee flexion stretch  Given: HEP, Symptoms/condition management  How Provided: Verbal  Provided to: Patient  Level of Understanding: Verbalized, Demonstrated              Time Calculation:   Start Time: 1611  Stop Time: 1740  Time Calculation (min): 89 min  Therapy Charges for Today     Code Description Service Date Service Provider Modifiers Qty    56438429362 HC PT MANUAL THERAPY EA 15 MIN 9/11/2019 Janee Pierce, PT GP 1    38992889172 HC PT THER PROC EA 15 MIN 9/11/2019 Janee Pierce, PT GP 2                    Janee Pierce, PT  9/11/2019

## 2019-09-13 ENCOUNTER — HOSPITAL ENCOUNTER (OUTPATIENT)
Dept: PHYSICAL THERAPY | Facility: HOSPITAL | Age: 69
Setting detail: THERAPIES SERIES
Discharge: HOME | End: 2019-09-13

## 2019-09-13 DIAGNOSIS — Z96.651 STATUS POST TOTAL RIGHT KNEE REPLACEMENT: Primary | ICD-10-CM

## 2019-09-13 PROCEDURE — 97110 THERAPEUTIC EXERCISES: CPT | Performed by: PHYSICAL THERAPIST

## 2019-09-13 NOTE — THERAPY TREATMENT NOTE
Outpatient Physical Therapy Ortho Treatment Note   [place]     Patient Name: Jefry Sabillon  : 1950  MRN: [id number]  Today's Date: 2019      Visit Date: 2019    Visit Dx:    ICD-10-CM ICD-9-CM   1. Status post total right knee replacement Z96.651 V43.65       Patient Active Problem List   Diagnosis   • Primary osteoarthritis of knees, bilateral   • Acute dyspnea   • Pericarditis   • Essential hypertension   • Mixed hyperlipidemia   • Primary osteoarthritis of right knee   • S/P total knee arthroplasty        Past Medical History:   Diagnosis Date   • Arthritis    • Asthma    • Cardiac disease    • Coronary artery disease    • Diabetes mellitus (CMS/[facility])     take Metformin   • Heart disease    • Hypertension    • Knee sprain    • Mini stroke (CMS/[facility])    • EVE (obstructive sleep apnea)    • Rotator cuff syndrome         Past Surgical History:   Procedure Laterality Date   • HERNIA REPAIR     • KNEE SURGERY  1970   • TOTAL KNEE ARTHROPLASTY Right 2019    Procedure: TOTAL KNEE ARTHROPLASTY AND ALL ASSOCIATED PROCEDURES;  Surgeon: Lopez Mejias MD;  Location: [facility];  Service: Orthopedics       PT Ortho     Row Name 19 1000       Subjective Comments    Subjective Comments  Pt states his knee is still pretty sore.  -GC    Row Name 19 1611       Subjective Comments    Subjective Comments  Patient reports that he was hurting following last visit but is feeling better today.  Reports that his knee feels a little more mobile today.    -SP       Right Lower Ext    Rt Knee Extension/Flexion AROM  0-13-60 degrees  -SP      User Key  (r) = Recorded By, (t) = Taken By, (c) = Cosigned By    Initials Name Provider Type    Janee Crooks, PT Physical Therapist    Jose Hilliard PT Physical Therapist                      PT Assessment/Plan     Row Name 19 1000          PT Assessment    Assessment Comments  Pt is showing some increased range of motion with  his stretches.  -GC        PT Plan    PT Plan Comments  Pt is to continue his HEP 2x daily.  -GC       User Key  (r) = Recorded By, (t) = Taken By, (c) = Cosigned By    Initials Name Provider Type    Jose Hilliard PT Physical Therapist            OP Exercises     Row Name 09/13/19 1000             Subjective Comments    Subjective Comments  Pt states his knee is still pretty sore.  -GC         Exercise 1    Exercise Name 1  patella mobilizations  -GC      Cueing 1  Tactile  -GC      Time 1  5 min  -GC         Exercise 2    Exercise Name 2  Heel slides  -GC      Cueing 2  Verbal;Tactile  -GC      Time 2  10 min  -GC         Exercise 3    Exercise Name 3  Wall Slides  -GC      Cueing 3  Verbal;Tactile  -GC      Time 3  10 min  -GC         Exercise 4    Exercise Name 4  Passive knee flexion stretch  -GC      Cueing 4  Verbal;Tactile  -GC      Reps 4  10  -GC      Time 4  10 secs  -GC         Exercise 5    Exercise Name 5  Hamstring/gastroc stretch  -GC      Cueing 5  Verbal;Tactile  -GC      Time 5  5 min  -GC         Exercise 6    Exercise Name 6  Supine knee extension on roll  -GC      Cueing 6  Verbal  -GC      Time 6  5 min  -GC         Exercise 7    Exercise Name 7  Passive knee extension stretches  -GC      Cueing 7  Verbal;Tactile  -GC      Reps 7  10   -GC      Time 7  10 sec  -GC         Exercise 8    Exercise Name 8  QS with Russian Stim  -GC      Cueing 8  Verbal;Tactile  -GC      Time 8  10 min 10/10  -GC         Exercise 9    Exercise Name 9  SLR  -GC      Cueing 9  Verbal;Tactile  -GC      Reps 9  20  -GC         Exercise 10    Exercise Name 10  Hip ABD  -GC      Cueing 10  Verbal;Tactile  -GC      Reps 10  20  -GC         Exercise 11    Exercise Name 11  SAQ  -GC      Cueing 11  Verbal;Tactile  -GC      Reps 11  20  -GC         Exercise 12    Exercise Name 12  PF vs theraband   -GC      Cueing 12  Verbal;Tactile  -GC      Reps 12  20  -GC      Time 12  silver  -GC        User Key  (r) = Recorded By,  (t) = Taken By, (c) = Cosigned By    Initials Name Provider Type     Jose Adam, PT Physical Therapist                                          Time Calculation:   Start Time: 1000  Stop Time: 1123  Time Calculation (min): 83 min  Therapy Charges for Today     Code Description Service Date Service Provider Modifiers Qty    [id number]  PT THER PROC EA 15 MIN 9/13/2019 Jose Adam, PT GP 2                    Jose Adam, PT  9/13/2019

## 2019-09-16 ENCOUNTER — HOSPITAL ENCOUNTER (OUTPATIENT)
Dept: PHYSICAL THERAPY | Facility: HOSPITAL | Age: 69
Setting detail: THERAPIES SERIES
Discharge: HOME | End: 2019-09-16

## 2019-09-16 DIAGNOSIS — Z96.651 STATUS POST TOTAL RIGHT KNEE REPLACEMENT: Primary | ICD-10-CM

## 2019-09-16 PROCEDURE — 97110 THERAPEUTIC EXERCISES: CPT | Performed by: PHYSICAL THERAPIST

## 2019-09-16 NOTE — THERAPY TREATMENT NOTE
Outpatient Physical Therapy Ortho Treatment Note   [facility]     Patient Name: Jefry Sabillon  : 1950  MRN: [id number]  Today's Date: 2019      Visit Date: 2019    Visit Dx:    ICD-10-CM ICD-9-CM   1. Status post total right knee replacement Z96.651 V43.65       Patient Active Problem List   Diagnosis   • Primary osteoarthritis of knees, bilateral   • Acute dyspnea   • Pericarditis   • Essential hypertension   • Mixed hyperlipidemia   • Primary osteoarthritis of right knee   • S/P total knee arthroplasty        Past Medical History:   Diagnosis Date   • Arthritis    • Asthma    • Cardiac disease    • Coronary artery disease    • Diabetes mellitus (CMS/HCC)     take Metformin   • Heart disease    • Hypertension    • Knee sprain    • Mini stroke (CMS/HCC)    • EVE (obstructive sleep apnea)    • Rotator cuff syndrome         Past Surgical History:   Procedure Laterality Date   • HERNIA REPAIR     • KNEE SURGERY     • TOTAL KNEE ARTHROPLASTY Right 2019    Procedure: TOTAL KNEE ARTHROPLASTY AND ALL ASSOCIATED PROCEDURES;  Surgeon: Lopez Mejias MD;  Location: [facility];  Service: Orthopedics       PT Ortho     Row Name 19 1100       Subjective Comments    Subjective Comments  Pt states he is getting around better.  -      User Key  (r) = Recorded By, (t) = Taken By, (c) = Cosigned By    Initials Name Provider Type    Jose Hilliard PT Physical Therapist                      PT Assessment/Plan     Row Name 19 1100          PT Assessment    Assessment Comments  Pt is noted to ambulated with increased speed thsi morning and had no trouble with transitional movements.  -        PT Plan    PT Plan Comments  Pt is to continue his HEP daily.  -       User Key  (r) = Recorded By, (t) = Taken By, (c) = Cosigned By    Initials Name Provider Type    Jose Hilliard PT Physical Therapist            OP Exercises     Row Name 19 1100             Subjective  Comments    Subjective Comments  Pt states he is getting around better.  -GC         Exercise 1    Exercise Name 1  patella mobilizations  -GC      Cueing 1  Tactile  -GC      Time 1  5 min  -GC         Exercise 2    Exercise Name 2  Heel slides  -GC      Cueing 2  Verbal;Tactile  -GC      Time 2  10 min  -GC         Exercise 3    Exercise Name 3  Wall Slides  -GC      Cueing 3  Verbal;Tactile  -GC      Time 3  10 min  -GC         Exercise 4    Exercise Name 4  Passive knee flexion stretch  -GC      Cueing 4  Verbal;Tactile  -GC      Reps 4  10  -GC      Time 4  10 secs  -GC         Exercise 5    Exercise Name 5  Hamstring/gastroc stretch  -GC      Cueing 5  Verbal;Tactile  -GC      Time 5  5 min  -GC         Exercise 6    Exercise Name 6  Supine knee extension on roll  -GC      Cueing 6  Verbal  -GC      Time 6  5 min  -GC         Exercise 7    Exercise Name 7  Passive knee extension stretches  -GC      Cueing 7  Verbal;Tactile  -GC      Reps 7  10   -GC      Time 7  10 sec  -GC         Exercise 8    Exercise Name 8  QS with Russian Stim  -GC      Cueing 8  Verbal;Tactile  -GC      Time 8  10 min 10/10  -GC         Exercise 9    Exercise Name 9  SLR  -GC      Cueing 9  Verbal;Tactile  -GC      Reps 9  20  -GC         Exercise 10    Exercise Name 10  Hip ABD  -GC      Cueing 10  Verbal;Tactile  -GC      Reps 10  20  -GC         Exercise 11    Exercise Name 11  SAQ  -GC      Cueing 11  Verbal;Tactile  -GC      Reps 11  20  -GC         Exercise 12    Exercise Name 12  PF vs theraband   -GC      Cueing 12  Verbal;Tactile  -GC      Reps 12  20  -GC      Time 12  silver  -GC        User Key  (r) = Recorded By, (t) = Taken By, (c) = Cosigned By    Initials Name Provider Type    Jose Hilliard PT Physical Therapist                                          Time Calculation:   Start Time: 1100  Stop Time: 1206  Time Calculation (min): 66 min  Therapy Charges for Today     Code Description Service Date Service Provider  Modifiers Qty    [id number]  PT THER PROC EA 15 MIN 9/16/2019 Jose Adam, PT GP 2                    Jose Adam, PT  9/16/2019

## 2019-09-18 ENCOUNTER — READMISSION MANAGEMENT (OUTPATIENT)
Dept: CALL CENTER | Facility: HOSPITAL | Age: 69
End: 2019-09-18

## 2019-09-18 ENCOUNTER — HOSPITAL ENCOUNTER (OUTPATIENT)
Dept: PHYSICAL THERAPY | Facility: HOSPITAL | Age: 69
Setting detail: THERAPIES SERIES
Discharge: HOME | End: 2019-09-18

## 2019-09-18 DIAGNOSIS — Z96.651 STATUS POST TOTAL RIGHT KNEE REPLACEMENT: Primary | ICD-10-CM

## 2019-09-18 PROCEDURE — 97110 THERAPEUTIC EXERCISES: CPT | Performed by: PHYSICAL THERAPIST

## 2019-09-18 NOTE — THERAPY TREATMENT NOTE
Outpatient Physical Therapy Ortho Treatment Note   Linda Holm     Patient Name: Jefry Sabillon  : 1950  MRN: [id number]  Today's Date: 2019      Visit Date: 2019    Visit Dx:    ICD-10-CM ICD-9-CM   1. Status post total right knee replacement Z96.651 V43.65       Patient Active Problem List   Diagnosis   • Primary osteoarthritis of knees, bilateral   • Acute dyspnea   • Pericarditis   • Essential hypertension   • Mixed hyperlipidemia   • Primary osteoarthritis of right knee   • S/P total knee arthroplasty        Past Medical History:   Diagnosis Date   • Arthritis    • Asthma    • Cardiac disease    • Coronary artery disease    • Diabetes mellitus (CMS/HCC)     take Metformin   • Heart disease    • Hypertension    • Knee sprain    • Mini stroke (CMS/HCC)    • EVE (obstructive sleep apnea)    • Rotator cuff syndrome         Past Surgical History:   Procedure Laterality Date   • HERNIA REPAIR     • KNEE SURGERY     • TOTAL KNEE ARTHROPLASTY Right 2019    Procedure: TOTAL KNEE ARTHROPLASTY AND ALL ASSOCIATED PROCEDURES;  Surgeon: Lopez Mejias MD;  Location: [facility];  Service: Orthopedics       PT Ortho     Row Name 19 1100       Subjective Comments    Subjective Comments  Pt states his knee is still stiff and sore, but it is easier getting in/out of car now.  -GC       Right Lower Ext    Rt Knee Extension/Flexion AROM  0-6-77 degrees  -GC       MMT Right Lower Ext    Rt Hip Flexion MMT, Gross Movement  (4/5) good  -GC    Rt Hip ABduction MMT, Gross Movement  (4/5) good  -GC    Rt Hip ADduction MMT, Gross Movement  (3+/5) fair plus  -GC    Rt Knee Extension MMT, Gross Movement  (4/5) good  -GC    Rt Knee Flexion MMT, Gross Movement  (4/5) good  -GC    Rt Ankle Plantarflexion MMT, Gross Movement  (4/5) good  -GC    Row Name 19 1100       Subjective Comments    Subjective Comments  Pt states he is getting around better.  -GC      User Key  (r) = Recorded By, (t) =  Taken By, (c) = Cosigned By    Initials Name Provider Type    GC Jose Adam, PT Physical Therapist                      PT Assessment/Plan     Row Name 09/18/19 1100          PT Assessment    Assessment Comments  Pt continues to have greatly limited knee ROM and he is not tolerating aggressive stretching well.  -GC        PT Plan    PT Plan Comments  Pt is to continue his HEP 2x daily. He has MD follow up again 9/20/2019. Will continue as advised.  -GC       User Key  (r) = Recorded By, (t) = Taken By, (c) = Cosigned By    Initials Name Provider Type    GC Jose Adam, PT Physical Therapist            OP Exercises     Row Name 09/18/19 1100             Subjective Comments    Subjective Comments  Pt states his knee is still stiff and sore, but it is easier getting in/out of car now.  -GC         Exercise 1    Exercise Name 1  patella mobilizations  -GC      Cueing 1  Tactile  -GC      Time 1  5 min  -GC         Exercise 2    Exercise Name 2  Heel slides  -GC      Cueing 2  Verbal;Tactile  -GC      Time 2  10 min  -GC         Exercise 3    Exercise Name 3  Wall Slides  -GC      Cueing 3  Verbal;Tactile  -GC      Time 3  10 min  -GC         Exercise 4    Exercise Name 4  Passive knee flexion stretch  -GC      Cueing 4  Verbal;Tactile  -GC      Reps 4  10  -GC      Time 4  10 secs  -GC         Exercise 5    Exercise Name 5  Hamstring/gastroc stretch  -GC      Cueing 5  Verbal;Tactile  -GC      Time 5  5 min  -GC         Exercise 6    Exercise Name 6  Supine knee extension on roll  -GC      Cueing 6  Verbal  -GC      Time 6  5 min  -GC         Exercise 7    Exercise Name 7  Passive knee extension stretches  -GC      Cueing 7  Verbal;Tactile  -GC      Reps 7  10   -GC      Time 7  10 sec  -GC         Exercise 8    Exercise Name 8  QS with Russian Stim  -GC      Cueing 8  Verbal;Tactile  -GC      Time 8  10 min 10/10  -GC         Exercise 9    Exercise Name 9  SLR  -GC      Cueing 9  Verbal;Tactile  -GC      Reps 9   20  -GC         Exercise 10    Exercise Name 10  Hip ABD  -GC      Cueing 10  Verbal;Tactile  -GC      Reps 10  20  -GC         Exercise 11    Exercise Name 11  SAQ  -GC      Cueing 11  Verbal;Tactile  -GC      Reps 11  20  -GC         Exercise 12    Exercise Name 12  LAQ  -GC      Cueing 12  Verbal;Tactile  -GC      Reps 12  25  -GC      Time 12  1#  -GC         Exercise 13    Exercise Name 13  Heel Raises  -GC      Cueing 13  Verbal;Demo  -GC      Reps 13  25  -GC         Exercise 14    Exercise Name 14  Partial squats  -GC      Cueing 14  Verbal;Demo  -GC      Reps 14  25  -GC        User Key  (r) = Recorded By, (t) = Taken By, (c) = Cosigned By    Initials Name Provider Type     Jose Adam, PT Physical Therapist                                          Time Calculation:   Start Time: 1100  Stop Time: 1212  Time Calculation (min): 72 min  Therapy Charges for Today     Code Description Service Date Service Provider Modifiers Qty    26368236387 HC PT THER PROC EA 15 MIN 9/18/2019 Jose Adam, PT GP 2                    Jose Adam, VEL  9/18/2019

## 2019-09-18 NOTE — OUTREACH NOTE
Total Joint Week 2 Survey      Responses   Facility patient discharged from?  [place]   Does the patient have one of the following disease processes/diagnoses(primary or secondary)?  Total Joint Replacement   Joint surgery performed?  Knee   Week 2 attempt successful?  Yes   Call start time  1125   Call end time  1128   Has the patient been back in either the hospital or Emergency Department since discharge?  No   Discharge diagnosis  Primary osteoarthritis of right knee, s/p total knee arthroplasty   What is the patient's perception of their functional status since discharge?  Improving   Week 2 call completed?  Yes   Wrap up additional comments  Pt says he was doing fine, he was at PT today and it was a quick phone call.          Maria Victoria Shay RN

## 2019-09-20 ENCOUNTER — OFFICE VISIT (OUTPATIENT)
Dept: ORTHOPEDIC SURGERY | Facility: CLINIC | Age: 69
End: 2019-09-20

## 2019-09-20 ENCOUNTER — HOSPITAL ENCOUNTER (OUTPATIENT)
Dept: PHYSICAL THERAPY | Facility: HOSPITAL | Age: 69
Setting detail: THERAPIES SERIES
Discharge: HOME | End: 2019-09-20

## 2019-09-20 DIAGNOSIS — Z96.651 STATUS POST TOTAL RIGHT KNEE REPLACEMENT: Primary | ICD-10-CM

## 2019-09-20 PROCEDURE — 97140 MANUAL THERAPY 1/> REGIONS: CPT

## 2019-09-20 PROCEDURE — 99024 POSTOP FOLLOW-UP VISIT: CPT | Performed by: NURSE PRACTITIONER

## 2019-09-20 PROCEDURE — 97110 THERAPEUTIC EXERCISES: CPT

## 2019-09-20 RX ORDER — MELOXICAM 15 MG/1
15 TABLET ORAL DAILY
Qty: 30 TABLET | Refills: 0 | Status: ON HOLD | OUTPATIENT
Start: 2019-09-20 | End: 2019-11-11

## 2019-09-20 RX ORDER — HYDROCODONE BITARTRATE AND ACETAMINOPHEN 5; 325 MG/1; MG/1
TABLET ORAL
Qty: 36 TABLET | Refills: 0 | Status: ON HOLD | OUTPATIENT
Start: 2019-09-20 | End: 2019-11-11

## 2019-09-20 NOTE — THERAPY TREATMENT NOTE
Outpatient Physical Therapy Ortho Treatment Note   Linda Holm     Patient Name: Jefry Sabillon  : 1950  MRN: [id number]  Today's Date: 2019      Visit Date: 2019    Visit Dx:    ICD-10-CM ICD-9-CM   1. Status post total right knee replacement Z96.651 V43.65       Patient Active Problem List   Diagnosis   • Primary osteoarthritis of knees, bilateral   • Acute dyspnea   • Pericarditis   • Essential hypertension   • Mixed hyperlipidemia   • Primary osteoarthritis of right knee   • Status post total right knee replacement        Past Medical History:   Diagnosis Date   • Arthritis    • Asthma    • Cardiac disease    • Coronary artery disease    • Diabetes mellitus (CMS/HCC)     take Metformin   • Heart disease    • Hypertension    • Knee sprain    • Mini stroke (CMS/HCC)    • EVE (obstructive sleep apnea)    • Rotator cuff syndrome         Past Surgical History:   Procedure Laterality Date   • HERNIA REPAIR     • KNEE SURGERY     • TOTAL KNEE ARTHROPLASTY Right 2019    Procedure: TOTAL KNEE ARTHROPLASTY AND ALL ASSOCIATED PROCEDURES;  Surgeon: Lopez Mejias MD;  Location: [facility];  Service: Orthopedics       PT Ortho     Row Name 19 1100       Subjective Comments    Subjective Comments  Pt states his knee is still stiff and sore, but it is easier getting in/out of car now.  -GC       Right Lower Ext    Rt Knee Extension/Flexion AROM  0-6-77 degrees  -GC       MMT Right Lower Ext    Rt Hip Flexion MMT, Gross Movement  (4/5) good  -GC    Rt Hip ABduction MMT, Gross Movement  (4/5) good  -GC    Rt Hip ADduction MMT, Gross Movement  (3+/5) fair plus  -GC    Rt Knee Extension MMT, Gross Movement  (4/5) good  -GC    Rt Knee Flexion MMT, Gross Movement  (4/5) good  -GC    Rt Ankle Plantarflexion MMT, Gross Movement  (4/5) good  -GC      User Key  (r) = Recorded By, (t) = Taken By, (c) = Cosigned By    Initials Name Provider Type    Jose Hilliard PT Physical Therapist                       PT Assessment/Plan     Row Name 09/20/19 1020          PT Assessment    Assessment Comments  Pt continues to demonstrate very limited knee flexion in active and passive ROM.  Pt progressed well with prescribed exercises with fwd step ups incorporated. Pt practiced ambulation with SPC and demonstrates limited knee flexion.   -KM        PT Plan    PT Plan Comments  Continue to push knee ROM and progress as tolerated.   -KM       User Key  (r) = Recorded By, (t) = Taken By, (c) = Cosigned By    Initials Name Provider Type    Mckayla Yu PTA Physical Therapy Assistant            OP Exercises     Row Name 09/20/19 1020             Subjective Comments    Subjective Comments  Pt states his knee feels pretty good and has minimal pain rating 2/10. States his MD appointment went well and had asked about transitioning to using a SPC  -KM         Exercise 1    Exercise Name 1  patella mobilizations  -KM      Cueing 1  Tactile  -KM      Time 1  5 min  -KM         Exercise 2    Exercise Name 2  Heel slides  -KM      Cueing 2  Verbal;Tactile  -KM      Time 2  10 min  -KM         Exercise 3    Exercise Name 3  Wall Slides  -KM      Cueing 3  Verbal;Tactile  -KM      Time 3  10 min  -KM         Exercise 4    Exercise Name 4  Passive knee flexion stretch  -KM      Cueing 4  Verbal;Tactile  -KM      Reps 4  10  -KM      Time 4  10 secs  -KM         Exercise 5    Exercise Name 5  Hamstring/gastroc stretch  -KM      Cueing 5  Verbal;Tactile  -KM      Time 5  5 min  -KM         Exercise 6    Exercise Name 6  Supine knee extension on roll  -KM      Cueing 6  Verbal  -KM      Time 6  5 min  -KM         Exercise 7    Exercise Name 7  Passive knee extension stretches  -KM      Cueing 7  Verbal;Tactile  -KM      Reps 7  10   -KM      Time 7  10 sec  -KM         Exercise 8    Exercise Name 8  QS with Russian Stim  -KM      Cueing 8  Verbal;Tactile  -KM      Time 8  10 min 10/10  -KM         Exercise 9    Exercise Name  "9  SLR  -KM      Cueing 9  Verbal;Tactile  -KM      Reps 9  25  -KM         Exercise 10    Exercise Name 10  Hip ABD  -KM      Cueing 10  Verbal;Tactile  -KM      Reps 10  25  -KM         Exercise 11    Exercise Name 11  SAQ  -KM      Cueing 11  Verbal;Tactile  -KM      Reps 11  25  -KM         Exercise 12    Exercise Name 12  LAQ  -KM      Cueing 12  Verbal;Tactile  -KM      Reps 12  25  -KM      Time 12  --  -KM         Exercise 13    Exercise Name 13  Heel Raises  -KM      Cueing 13  Verbal;Demo  -KM      Reps 13  25  -KM         Exercise 14    Exercise Name 14  Partial squats  -KM      Cueing 14  Verbal;Demo  -KM      Reps 14  25  -KM         Exercise 15    Exercise Name 15  6\" Fwd Step Ups  -KM      Cueing 15  Verbal;Demo  -KM      Reps 15  15x  -KM        User Key  (r) = Recorded By, (t) = Taken By, (c) = Cosigned By    Initials Name Provider Type    Mckayla Yu PTA Physical Therapy Assistant                                          Time Calculation:   Start Time: 1020  Stop Time: 1145  Time Calculation (min): 85 min  Therapy Charges for Today     Code Description Service Date Service Provider Modifiers Qty    99799066392 HC PT MANUAL THERAPY EA 15 MIN 9/20/2019 Mckayla Perdomo PTA GP 1    24174052478 HC PT THER PROC EA 15 MIN 9/20/2019 Mckayla Perdomo PTA GP 2                    Mckayla Perdomo PTA  9/20/2019     "

## 2019-09-20 NOTE — PROGRESS NOTES
CC: s/p right total knee arthroplasty, DOS 2019  Interval History: Jefry Sabillon returns for 2 week postoperative visit.  He is doing well. Pain is controlled with pain medication and is  improving. He denies any wound problem, fevers, or chills. Patient is continuing to work with outpatient PT. Ambulating with walker. Continuing DVT prophylaxis with use of Eliquis.     Physical Examination: Right knee was examined   Incision clean and dry   ROM 0-77, 4/5 strength   Stable to varus and valgus stress   Flex/extend ankle and toes   Positive sensation right foot   No calf pain, negative Homans sign bilaterally  Assessment/Plan:  Jefry Sabillon is recovering from surgery as expected.  We will continue outpatient therapy for range of motion, strengthening, and gait normalization.    He is to follow up in clinic in 4 weeks with xrays. Patient had all question answered today. Will continue DVT prophylaxis for an additional 2 weeks. Discussed with patient to avoid immersing incision for 4 weeks total after surgery.     Medications:  New Medications Ordered This Visit   Medications   • HYDROcodone-acetaminophen (NORCO) 5-325 MG per tablet     Si-2 tablets every 4-6 hours as needed moderate-severe pain     Dispense:  36 tablet     Refill:  0   • meloxicam (MOBIC) 15 MG tablet     Sig: Take 1 tablet by mouth Daily.     Dispense:  30 tablet     Refill:  0       RASHAD Campo

## 2019-09-23 ENCOUNTER — HOSPITAL ENCOUNTER (OUTPATIENT)
Dept: PHYSICAL THERAPY | Facility: HOSPITAL | Age: 69
Setting detail: THERAPIES SERIES
Discharge: HOME | End: 2019-09-23

## 2019-09-23 PROCEDURE — 97110 THERAPEUTIC EXERCISES: CPT

## 2019-09-23 PROCEDURE — 97140 MANUAL THERAPY 1/> REGIONS: CPT

## 2019-09-23 NOTE — THERAPY TREATMENT NOTE
Outpatient Physical Therapy Ortho Treatment Note   Linda Holm     Patient Name: Jefry Sabillon  : 1950  MRN: [id number]  Today's Date: 2019      Visit Date: 2019    Visit Dx:  No diagnosis found.    Patient Active Problem List   Diagnosis   • Primary osteoarthritis of knees, bilateral   • Acute dyspnea   • Pericarditis   • Essential hypertension   • Mixed hyperlipidemia   • Primary osteoarthritis of right knee   • Status post total right knee replacement        Past Medical History:   Diagnosis Date   • Arthritis    • Asthma    • Cardiac disease    • Coronary artery disease    • Diabetes mellitus (CMS/HCC)     take Metformin   • Heart disease    • Hypertension    • Knee sprain    • Mini stroke (CMS/HCC)    • EVE (obstructive sleep apnea)    • Rotator cuff syndrome         Past Surgical History:   Procedure Laterality Date   • HERNIA REPAIR     • KNEE SURGERY  1970   • TOTAL KNEE ARTHROPLASTY Right 2019    Procedure: TOTAL KNEE ARTHROPLASTY AND ALL ASSOCIATED PROCEDURES;  Surgeon: Lopez Mejias MD;  Location: [facility];  Service: Orthopedics       PT Ortho     Row Name 195       Right Lower Ext    Rt Knee Extension/Flexion AROM  0-4-80 post stretch  -KM      User Key  (r) = Recorded By, (t) = Taken By, (c) = Cosigned By    Initials Name Provider Type    Mckayla Yu PTA Physical Therapy Assistant                      PT Assessment/Plan     Row Name 19 1118          PT Assessment    Assessment Comments  Pt demonstrates improved gait with use of SPC. Pt measures minimal improved knee AAROM but continues to demonstrate limited knee flexion. Progression of ther ex tolerated well.   -KM        PT Plan    PT Plan Comments  Progress as tolerated  -KM       User Key  (r) = Recorded By, (t) = Taken By, (c) = Cosigned By    Initials Name Provider Type    Mckayla Yu PTA Physical Therapy Assistant            OP Exercises     Row Name 19 1111              Subjective Comments    Subjective Comments  Pt states his knee has started to feel better in regards to decreased pain and swelling after starting the anti-inflammatory.   -KM         Exercise 1    Exercise Name 1  patella mobilizations  -KM      Cueing 1  Tactile  -KM      Time 1  5 min  -KM         Exercise 2    Exercise Name 2  Heel slides  -KM      Cueing 2  Verbal;Tactile  -KM      Time 2  10 min  -KM         Exercise 3    Exercise Name 3  Wall Slides  -KM      Cueing 3  Verbal;Tactile  -KM      Time 3  10 min  -KM         Exercise 4    Exercise Name 4  Passive knee flexion stretch  -KM      Cueing 4  Verbal;Tactile  -KM      Reps 4  10  -KM      Time 4  10 secs  -KM         Exercise 5    Exercise Name 5  Hamstring/gastroc stretch  -KM      Cueing 5  Verbal;Tactile  -KM      Time 5  5 min  -KM         Exercise 6    Exercise Name 6  Supine knee extension on roll  -KM      Cueing 6  Verbal  -KM      Time 6  5 min  -KM      Additional Comments  1#  -KM         Exercise 7    Exercise Name 7  Passive knee extension stretches  -KM      Cueing 7  Verbal;Tactile  -KM      Reps 7  10   -KM      Time 7  10 sec  -KM         Exercise 8    Exercise Name 8  QS with Russian Stim  -KM      Cueing 8  Verbal;Tactile  -KM      Time 8  10 min 10/10  -KM         Exercise 9    Exercise Name 9  SLR  -KM      Cueing 9  Verbal;Tactile  -KM      Reps 9  25  -KM      Time 9  1#  -KM         Exercise 10    Exercise Name 10  Hip ABD  -KM      Cueing 10  Verbal;Tactile  -KM      Reps 10  25  -KM      Time 10  1#  -KM         Exercise 11    Exercise Name 11  SAQ  -KM      Cueing 11  Verbal;Tactile  -KM      Reps 11  25  -KM      Time 11  1#  -KM         Exercise 12    Exercise Name 12  LAQ  -KM      Cueing 12  Verbal;Tactile  -KM      Reps 12  25  -KM      Time 12  1#  -KM         Exercise 13    Exercise Name 13  Heel Raises  -KM      Cueing 13  Verbal;Demo  -KM      Reps 13  25  -KM         Exercise 14    Exercise Name 14  Partial squats   "-KM      Cueing 14  Verbal;Demo  -KM      Reps 14  25  -KM         Exercise 15    Exercise Name 15  6\" Fwd Step Ups  -KM      Cueing 15  Verbal;Demo  -KM      Reps 15  25x  -KM         Exercise 16    Exercise Name 16  TKE vs TB  -KM      Reps 16  25  -KM      Time 16  Silver  -KM        User Key  (r) = Recorded By, (t) = Taken By, (c) = Cosigned By    Initials Name Provider Type    Mckayla Yu PTA Physical Therapy Assistant                                          Time Calculation:   Start Time: 1115  Stop Time: 1236  Time Calculation (min): 81 min  Therapy Charges for Today     Code Description Service Date Service Provider Modifiers Qty    84007910558 HC PT MANUAL THERAPY EA 15 MIN 9/23/2019 Mckayla Perdomo PTA GP 1    84209695789 HC PT THER PROC EA 15 MIN 9/23/2019 Mckayla Perdomo PTA GP 1                    Mckayla Perdomo PTA  9/23/2019     "

## 2019-09-25 ENCOUNTER — HOSPITAL ENCOUNTER (OUTPATIENT)
Dept: PHYSICAL THERAPY | Facility: HOSPITAL | Age: 69
Setting detail: THERAPIES SERIES
Discharge: HOME | End: 2019-09-25

## 2019-09-25 DIAGNOSIS — Z96.651 STATUS POST TOTAL RIGHT KNEE REPLACEMENT: Primary | ICD-10-CM

## 2019-09-25 PROCEDURE — 97110 THERAPEUTIC EXERCISES: CPT

## 2019-09-25 PROCEDURE — 97140 MANUAL THERAPY 1/> REGIONS: CPT

## 2019-09-25 NOTE — THERAPY TREATMENT NOTE
Outpatient Physical Therapy Ortho Treatment Note   Linda Holm     Patient Name: Jefry Sabillon  : 1950  MRN: [id number]  Today's Date: 2019      Visit Date: 2019    Visit Dx:    ICD-10-CM ICD-9-CM   1. Status post total right knee replacement Z96.651 V43.65       Patient Active Problem List   Diagnosis   • Primary osteoarthritis of knees, bilateral   • Acute dyspnea   • Pericarditis   • Essential hypertension   • Mixed hyperlipidemia   • Primary osteoarthritis of right knee   • Status post total right knee replacement        Past Medical History:   Diagnosis Date   • Arthritis    • Asthma    • Cardiac disease    • Coronary artery disease    • Diabetes mellitus (CMS/HCC)     take Metformin   • Heart disease    • Hypertension    • Knee sprain    • Mini stroke (CMS/HCC)    • EVE (obstructive sleep apnea)    • Rotator cuff syndrome         Past Surgical History:   Procedure Laterality Date   • HERNIA REPAIR     • KNEE SURGERY     • TOTAL KNEE ARTHROPLASTY Right 2019    Procedure: TOTAL KNEE ARTHROPLASTY AND ALL ASSOCIATED PROCEDURES;  Surgeon: Lopez Mejias MD;  Location: [facility];  Service: Orthopedics       PT Ortho     Row Name 19 1115       Right Lower Ext    Rt Knee Extension/Flexion AROM  0-4-80 post stretch  -KM      User Key  (r) = Recorded By, (t) = Taken By, (c) = Cosigned By    Initials Name Provider Type    Mckayla Yu PTA Physical Therapy Assistant                      PT Assessment/Plan     Row Name 19 1100          PT Assessment    Assessment Comments  Pt continues to progress well with strengthening exercises but ROM seems to be extremely limited.  -KM        PT Plan    PT Plan Comments  Continue to push ROM  -KM       User Key  (r) = Recorded By, (t) = Taken By, (c) = Cosigned By    Initials Name Provider Type    Mckayla Yu PTA Physical Therapy Assistant            OP Exercises     Row Name 19 1100             Subjective Comments  "   Subjective Comments  Pt states his knee is doing well and has been walking around more.   -KM         Exercise 1    Exercise Name 1  patella mobilizations  -KM      Cueing 1  Tactile  -KM      Time 1  5 min  -KM         Exercise 2    Exercise Name 2  Heel slides  -KM      Cueing 2  Verbal;Tactile  -KM      Time 2  10 min  -KM         Exercise 3    Exercise Name 3  Wall Slides  -KM      Cueing 3  Verbal;Tactile  -KM      Time 3  10 min  -KM         Exercise 4    Exercise Name 4  Passive knee flexion stretch  -KM      Cueing 4  Verbal;Tactile  -KM      Reps 4  10  -KM      Time 4  10 secs  -KM         Exercise 5    Exercise Name 5  Hamstring/gastroc stretch  -KM      Cueing 5  Verbal;Tactile  -KM      Time 5  5 min  -KM         Exercise 6    Exercise Name 6  Supine knee extension on roll  -KM      Cueing 6  Verbal  -KM      Time 6  5 min  -KM         Exercise 7    Exercise Name 7  Passive knee extension stretches  -KM      Cueing 7  Verbal;Tactile  -KM      Reps 7  10   -KM      Time 7  10 sec  -KM         Exercise 8    Exercise Name 8  QS with Russian Stim  -KM      Cueing 8  Verbal;Tactile  -KM      Time 8  10 min 10/10  -KM         Exercise 9    Exercise Name 9  SLR  -KM      Cueing 9  Verbal;Tactile  -KM      Reps 9  30  -KM      Time 9  1#  -KM         Exercise 10    Exercise Name 10  Hip ABD  -KM      Cueing 10  Verbal;Tactile  -KM      Reps 10  30  -KM      Time 10  1#  -KM         Exercise 11    Exercise Name 11  SAQ  -KM      Cueing 11  Verbal;Tactile  -KM      Reps 11  30  -KM      Time 11  1#  -KM         Exercise 12    Exercise Name 12  LAQ  -KM      Cueing 12  Verbal;Tactile  -KM      Reps 12  30  -KM      Time 12  1#  -KM         Exercise 13    Exercise Name 13  Heel Raises  -KM      Cueing 13  Verbal;Demo  -KM      Reps 13  30  -KM         Exercise 14    Exercise Name 14  Partial squats  -KM      Cueing 14  Verbal;Demo  -KM      Reps 14  30  -KM         Exercise 15    Exercise Name 15  6\" Fwd Step " Ups  -KM      Cueing 15  Verbal;Demo  -KM      Reps 15  25x  -KM         Exercise 16    Exercise Name 16  TKE vs TB  -KM      Reps 16  30  -KM      Time 16  Gold  -KM         Exercise 17    Exercise Name 17  Bike  -KM      Time 17  5 min  -KM      Additional Comments  seat 3   -KM        User Key  (r) = Recorded By, (t) = Taken By, (c) = Cosigned By    Initials Name Provider Type    Mckayla Yu PTA Physical Therapy Assistant                                          Time Calculation:   Start Time: 1100  Stop Time: 1228  Time Calculation (min): 88 min  Therapy Charges for Today     Code Description Service Date Service Provider Modifiers Qty    88670571552 HC PT MANUAL THERAPY EA 15 MIN 9/25/2019 Mckayla Perdomo PTA GP 1    91993417342 HC PT THER PROC EA 15 MIN 9/25/2019 Mckayla Perdomo PTA GP 1                    Mckayla Perdomo PTA  9/25/2019

## 2019-09-27 ENCOUNTER — HOSPITAL ENCOUNTER (OUTPATIENT)
Dept: PHYSICAL THERAPY | Facility: HOSPITAL | Age: 69
Setting detail: THERAPIES SERIES
Discharge: HOME | End: 2019-09-27

## 2019-09-27 DIAGNOSIS — Z96.651 STATUS POST TOTAL RIGHT KNEE REPLACEMENT: Primary | ICD-10-CM

## 2019-09-27 PROCEDURE — 97140 MANUAL THERAPY 1/> REGIONS: CPT

## 2019-09-27 PROCEDURE — 97110 THERAPEUTIC EXERCISES: CPT

## 2019-09-27 NOTE — THERAPY TREATMENT NOTE
Outpatient Physical Therapy Ortho Treatment Note   Linda Holm     Patient Name: Jefry Sabillon  : 1950  MRN: [id number]  Today's Date: 2019      Visit Date: 2019    Visit Dx:    ICD-10-CM ICD-9-CM   1. Status post total right knee replacement Z96.651 V43.65       Patient Active Problem List   Diagnosis   • Primary osteoarthritis of knees, bilateral   • Acute dyspnea   • Pericarditis   • Essential hypertension   • Mixed hyperlipidemia   • Primary osteoarthritis of right knee   • Status post total right knee replacement        Past Medical History:   Diagnosis Date   • Arthritis    • Asthma    • Cardiac disease    • Coronary artery disease    • Diabetes mellitus (CMS/[facility])     take Metformin   • Heart disease    • Hypertension    • Knee sprain    • Mini stroke (CMS/[facility])    • EVE (obstructive sleep apnea)    • Rotator cuff syndrome         Past Surgical History:   Procedure Laterality Date   • HERNIA REPAIR     • KNEE SURGERY  1970   • TOTAL KNEE ARTHROPLASTY Right 2019    Procedure: TOTAL KNEE ARTHROPLASTY AND ALL ASSOCIATED PROCEDURES;  Surgeon: Lopez Mejias MD;  Location: [facility];  Service: Orthopedics                       PT Assessment/Plan     Row Name 19 09          PT Assessment    Assessment Comments  Pt tolerated progression of exercises well, limited AAROM measured.   -KM        PT Plan    PT Plan Comments  Continue per POC  -KM       User Key  (r) = Recorded By, (t) = Taken By, (c) = Cosigned By    Initials Name Provider Type    Mckayla Yu PTA Physical Therapy Assistant            OP Exercises     Row Name 19 0947             Subjective Comments    Subjective Comments  Pt states he is experiencing increased knee stiffness since the night before. Pt states he did a not more standing and activity around the house.   -KM         Exercise 1    Exercise Name 1  patella mobilizations  -KM      Cueing 1  Tactile  -KM      Time 1  5 min  -KM          "Exercise 2    Exercise Name 2  Heel slides  -KM      Cueing 2  Verbal;Tactile  -KM      Time 2  10 min  -KM         Exercise 3    Exercise Name 3  Wall Slides  -KM      Cueing 3  Verbal;Tactile  -KM      Time 3  10 min  -KM         Exercise 4    Exercise Name 4  Passive knee flexion stretch  -KM      Cueing 4  Verbal;Tactile  -KM      Reps 4  10  -KM      Time 4  10 secs  -KM         Exercise 5    Exercise Name 5  Hamstring/gastroc stretch  -KM      Cueing 5  Verbal;Tactile  -KM      Time 5  5 min  -KM         Exercise 6    Exercise Name 6  Supine knee extension on roll  -KM      Cueing 6  Verbal  -KM      Time 6  5 min  -KM      Additional Comments  2#  -KM         Exercise 7    Exercise Name 7  Passive knee extension stretches  -KM      Cueing 7  Verbal;Tactile  -KM      Reps 7  10   -KM      Time 7  10 sec  -KM         Exercise 8    Exercise Name 8  QS with Russian Stim  -KM      Cueing 8  Verbal;Tactile  -KM      Time 8  10 min 10/10  -KM         Exercise 9    Exercise Name 9  SLR  -KM      Cueing 9  Verbal;Tactile  -KM      Reps 9  25  -KM      Time 9  2#  -KM         Exercise 10    Exercise Name 10  Hip ABD  -KM      Cueing 10  Verbal;Tactile  -KM      Reps 10  25  -KM      Time 10  2#  -KM         Exercise 11    Exercise Name 11  SAQ  -KM      Cueing 11  Verbal;Tactile  -KM      Reps 11  --  -KM      Time 11  --  -KM         Exercise 12    Exercise Name 12  LAQ  -KM      Cueing 12  Verbal;Tactile  -KM      Reps 12  25  -KM      Time 12  2#  -KM         Exercise 13    Exercise Name 13  Heel Raises  -KM      Cueing 13  Verbal;Demo  -KM      Reps 13  30  -KM         Exercise 14    Exercise Name 14  Partial squats  -KM      Cueing 14  Verbal;Demo  -KM      Reps 14  30  -KM         Exercise 15    Exercise Name 15  6\" Fwd Step Ups  -KM      Cueing 15  Verbal;Demo  -KM      Reps 15  25x  -KM         Exercise 16    Exercise Name 16  TKE vs TB  -KM      Reps 16  30  -KM      Time 16  Gold  -KM         Exercise 17    " "Exercise Name 17  Bike  -KM      Time 17  5 min  -KM         Exercise 18    Exercise Name 18  6\" Lat. Step Overs  -KM      Cueing 18  Verbal;Demo  -KM      Reps 18  10x  -KM        User Key  (r) = Recorded By, (t) = Taken By, (c) = Cosigned By    Initials Name Provider Type    Mckayla Yu PTA Physical Therapy Assistant                                          Time Calculation:   Start Time: 0930  Stop Time: 1100  Time Calculation (min): 90 min  Therapy Charges for Today     Code Description Service Date Service Provider Modifiers Qty    11791099953 HC PT MANUAL THERAPY EA 15 MIN 9/27/2019 Mckayla Perdomo PTA GP 1    39011625469 HC PT THER PROC EA 15 MIN 9/27/2019 Mckayla Perdomo PTA GP 1                    Mckayla Perdomo PTA  9/27/2019     "

## 2019-09-30 ENCOUNTER — HOSPITAL ENCOUNTER (OUTPATIENT)
Dept: PHYSICAL THERAPY | Facility: HOSPITAL | Age: 69
Setting detail: THERAPIES SERIES
Discharge: HOME | End: 2019-09-30

## 2019-09-30 PROCEDURE — 97140 MANUAL THERAPY 1/> REGIONS: CPT

## 2019-09-30 PROCEDURE — 97110 THERAPEUTIC EXERCISES: CPT

## 2019-10-02 ENCOUNTER — HOSPITAL ENCOUNTER (OUTPATIENT)
Dept: PHYSICAL THERAPY | Facility: HOSPITAL | Age: 69
Setting detail: THERAPIES SERIES
Discharge: HOME | End: 2019-10-02

## 2019-10-02 DIAGNOSIS — Z96.651 STATUS POST TOTAL RIGHT KNEE REPLACEMENT: Primary | ICD-10-CM

## 2019-10-02 PROCEDURE — 97110 THERAPEUTIC EXERCISES: CPT

## 2019-10-02 PROCEDURE — 97140 MANUAL THERAPY 1/> REGIONS: CPT

## 2019-10-02 NOTE — THERAPY TREATMENT NOTE
Outpatient Physical Therapy Ortho Treatment Note   Linda Holm     Patient Name: Jefry Sabillon  : 1950  MRN: [id number]  Today's Date: 10/2/2019      Visit Date: 10/02/2019    Visit Dx:    ICD-10-CM ICD-9-CM   1. Status post total right knee replacement Z96.651 V43.65       Patient Active Problem List   Diagnosis   • Primary osteoarthritis of knees, bilateral   • Acute dyspnea   • Pericarditis   • Essential hypertension   • Mixed hyperlipidemia   • Primary osteoarthritis of right knee   • Status post total right knee replacement        Past Medical History:   Diagnosis Date   • Arthritis    • Asthma    • Cardiac disease    • Coronary artery disease    • Diabetes mellitus (CMS/HCC)     take Metformin   • Heart disease    • Hypertension    • Knee sprain    • Mini stroke (CMS/HCC)    • EVE (obstructive sleep apnea)    • Rotator cuff syndrome         Past Surgical History:   Procedure Laterality Date   • HERNIA REPAIR     • KNEE SURGERY  1970   • TOTAL KNEE ARTHROPLASTY Right 2019    Procedure: TOTAL KNEE ARTHROPLASTY AND ALL ASSOCIATED PROCEDURES;  Surgeon: Lopez Mejias MD;  [facility];  Service: Orthopedics       PT Ortho     Row Name 19 1005       Subjective Comments    Subjective Comments  Pt states his has started walking without an AD over the weekend and has done well. States he notices increased tightness in his quad.   -KM       Right Lower Ext    Rt Knee Extension/Flexion AROM  0-3-82 post stretch  -KM      User Key  (r) = Recorded By, (t) = Taken By, (c) = Cosigned By    Initials Name Provider Type    Mckayla Yu PTA Physical Therapy Assistant                      PT Assessment/Plan     Row Name 10/02/19 4732          PT Assessment    Assessment Comments  Pt demonstrates minimal improvement with PROM in flexion and extension.   -KM        PT Plan    PT Plan Comments  Continue to push ROM and progress as tolerated.   -KM       User Key  (r) = Recorded By, (t) =  Taken By, (c) = Cosigned By    Initials Name Provider Type    cMkayla Yu, ANGELITO Physical Therapy Assistant            OP Exercises     Row Name 10/02/19 0955             Subjective Comments    Subjective Comments  Pt states he feels his knee continues to get better each day.   -KM         Exercise 1    Exercise Name 1  patella mobilizations  -KM      Cueing 1  Tactile  -KM      Time 1  5 min  -KM         Exercise 2    Exercise Name 2  Heel slides  -KM      Cueing 2  Verbal;Tactile  -KM      Time 2  10 min  -KM         Exercise 3    Exercise Name 3  Wall Slides  -KM      Cueing 3  Verbal;Tactile  -KM      Time 3  10 min  -KM         Exercise 4    Exercise Name 4  Passive knee flexion stretch  -KM      Cueing 4  Verbal;Tactile  -KM      Reps 4  10  -KM      Time 4  10 secs  -KM         Exercise 5    Exercise Name 5  Hamstring/gastroc stretch  -KM      Cueing 5  Verbal;Tactile  -KM      Time 5  5 min  -KM         Exercise 6    Exercise Name 6  Supine knee extension on roll  -KM      Cueing 6  Verbal  -KM      Time 6  5 min  -KM         Exercise 7    Exercise Name 7  Passive knee extension stretches  -KM      Cueing 7  Verbal;Tactile  -KM      Reps 7  10   -KM      Time 7  10 sec  -KM         Exercise 8    Exercise Name 8  QS with Russian Stim  -KM      Cueing 8  Verbal;Tactile  -KM      Time 8  10 min 10/10  -KM         Exercise 9    Exercise Name 9  SLR  -KM      Cueing 9  Verbal;Tactile  -KM      Reps 9  35  -KM      Time 9  2#  -KM         Exercise 10    Exercise Name 10  Hip ABD  -KM      Cueing 10  Verbal;Tactile  -KM      Reps 10  35  -KM      Time 10  2#  -KM         Exercise 11    Exercise Name 11  SAQ  -KM      Cueing 11  Verbal;Tactile  -KM         Exercise 12    Exercise Name 12  LAQ  -KM      Cueing 12  Verbal;Tactile  -KM      Reps 12  35  -KM      Time 12  2#  -KM         Exercise 13    Exercise Name 13  Heel Raises  -KM      Cueing 13  Verbal;Demo  -KM      Reps 13  40  -KM         Exercise 14     "Exercise Name 14  Partial squats  -KM      Cueing 14  Verbal;Demo  -KM      Reps 14  30  -KM         Exercise 15    Exercise Name 15  6\" Fwd Step Ups  -KM      Cueing 15  Verbal;Demo  -KM      Reps 15  40x  -KM         Exercise 16    Exercise Name 16  TKE vs TB  -KM      Reps 16  40  -KM      Time 16  Gold  -KM         Exercise 17    Exercise Name 17  Bike  -KM      Time 17  5 min  -KM         Exercise 18    Exercise Name 18  6\" Lat. Step Overs  -KM      Cueing 18  Verbal;Demo  -KM      Reps 18  20x  -KM        User Key  (r) = Recorded By, (t) = Taken By, (c) = Cosigned By    Initials Name Provider Type    Mckayla Yu PTA Physical Therapy Assistant                                          Time Calculation:   Start Time: 0955  Stop Time: 1120  Time Calculation (min): 85 min  Therapy Charges for Today     Code Description Service Date Service Provider Modifiers Qty    06031997091 HC PT MANUAL THERAPY EA 15 MIN 10/2/2019 Mckayla Perdomo PTA GP 1    44896170467 HC PT THER PROC EA 15 MIN 10/2/2019 Mckayla Perdomo PTA GP 1                    Mckayla Perdomo PTA  10/2/2019     "

## 2019-10-04 ENCOUNTER — HOSPITAL ENCOUNTER (OUTPATIENT)
Dept: PHYSICAL THERAPY | Facility: HOSPITAL | Age: 69
Setting detail: THERAPIES SERIES
Discharge: HOME | End: 2019-10-04

## 2019-10-04 DIAGNOSIS — Z96.651 STATUS POST TOTAL RIGHT KNEE REPLACEMENT: Primary | ICD-10-CM

## 2019-10-04 PROCEDURE — 97140 MANUAL THERAPY 1/> REGIONS: CPT

## 2019-10-04 PROCEDURE — 97110 THERAPEUTIC EXERCISES: CPT

## 2019-10-04 NOTE — THERAPY TREATMENT NOTE
Outpatient Physical Therapy Ortho Treatment Note   Linda Holm     Patient Name: Jefry Sabillon  : 1950  MRN: [id number]  Today's Date: 10/4/2019      Visit Date: 10/04/2019    Visit Dx:    ICD-10-CM ICD-9-CM   1. Status post total right knee replacement Z96.651 V43.65       Patient Active Problem List   Diagnosis   • Primary osteoarthritis of knees, bilateral   • Acute dyspnea   • Pericarditis   • Essential hypertension   • Mixed hyperlipidemia   • Primary osteoarthritis of right knee   • Status post total right knee replacement        Past Medical History:   Diagnosis Date   • Arthritis    • Asthma    • Cardiac disease    • Coronary artery disease    • Diabetes mellitus (CMS/[facility])     take Metformin   • Heart disease    • Hypertension    • Knee sprain    • Mini stroke (CMS/[facility])    • EVE (obstructive sleep apnea)    • Rotator cuff syndrome         Past Surgical History:   Procedure Laterality Date   • HERNIA REPAIR     • KNEE SURGERY  1970   • TOTAL KNEE ARTHROPLASTY Right 2019    Procedure: TOTAL KNEE ARTHROPLASTY AND ALL ASSOCIATED PROCEDURES;  Surgeon: Lopez Mejias MD;  Location: [facility];  Service: Orthopedics                       PT Assessment/Plan     Row Name 10/04/19 0935          PT Assessment    Assessment Comments  Pt continues to progress well with strengthening with minimal lag with SLR. Improved extension PROM with increased tightness noted in flexion. Instructed pt to practice walking backwards to help improve gait technique  -KM        PT Plan    PT Plan Comments  Continue per POC  -KM       User Key  (r) = Recorded By, (t) = Taken By, (c) = Cosigned By    Initials Name Provider Type    Mckayla Yu PTA Physical Therapy Assistant            OP Exercises     Row Name 10/04/19 3831             Subjective Comments    Subjective Comments  Pt states his knee feels good with some soreness and tightness.   -KM         Exercise 1    Exercise Name 1  patella mobilizations  -KM  "     Cueing 1  Tactile  -KM      Time 1  5 min  -KM         Exercise 2    Exercise Name 2  Heel slides  -KM      Cueing 2  Verbal;Tactile  -KM      Time 2  10 min  -KM         Exercise 3    Exercise Name 3  Wall Slides  -KM      Cueing 3  Verbal;Tactile  -KM      Time 3  10 min  -KM         Exercise 4    Exercise Name 4  Passive knee flexion stretch  -KM      Cueing 4  Verbal;Tactile  -KM      Reps 4  10  -KM      Time 4  10 secs  -KM         Exercise 5    Exercise Name 5  Hamstring/gastroc stretch  -KM      Cueing 5  Verbal;Tactile  -KM      Time 5  5 min  -KM         Exercise 6    Exercise Name 6  Supine knee extension on roll  -KM      Cueing 6  Verbal  -KM      Time 6  5 min  -KM      Additional Comments  3#  -KM         Exercise 7    Exercise Name 7  Passive knee extension stretches  -KM      Cueing 7  Verbal;Tactile  -KM      Reps 7  10   -KM      Time 7  10 sec  -KM         Exercise 8    Exercise Name 8  QS with Russian Stim  -KM      Cueing 8  Verbal;Tactile  -KM      Time 8  10 min 10/10  -KM         Exercise 9    Exercise Name 9  SLR  -KM      Cueing 9  Verbal;Tactile  -KM      Reps 9  35  -KM      Time 9  2#  -KM         Exercise 10    Exercise Name 10  Hip ABD  -KM      Cueing 10  Verbal;Tactile  -KM      Reps 10  35  -KM      Time 10  2#  -KM         Exercise 11    Exercise Name 11  SAQ  -KM      Cueing 11  Verbal;Tactile  -KM         Exercise 12    Exercise Name 12  LAQ  -KM      Cueing 12  Verbal;Tactile  -KM      Reps 12  35  -KM      Time 12  2#  -KM         Exercise 13    Exercise Name 13  Heel Raises  -KM      Cueing 13  Verbal;Demo  -KM      Reps 13  40  -KM         Exercise 14    Exercise Name 14  Partial squats  -KM      Cueing 14  Verbal;Demo  -KM      Reps 14  30  -KM         Exercise 15    Exercise Name 15  6\" Fwd Step Ups  -KM      Cueing 15  Verbal;Demo  -KM      Reps 15  40x  -KM         Exercise 16    Exercise Name 16  TKE vs TB  -KM      Reps 16  40  -KM      Time 16  Gold  -KM         " "Exercise 17    Exercise Name 17  Bike  -KM      Time 17  5 min  -KM         Exercise 18    Exercise Name 18  6\" Lat. Step Overs  -KM      Cueing 18  Verbal;Demo  -KM      Reps 18  20x  -KM        User Key  (r) = Recorded By, (t) = Taken By, (c) = Cosigned By    Initials Name Provider Type    Mckayla Yu PTA Physical Therapy Assistant                                          Time Calculation:   Start Time: 0955  Stop Time: 1125  Time Calculation (min): 90 min  Therapy Charges for Today     Code Description Service Date Service Provider Modifiers Qty    23792824836 HC PT MANUAL THERAPY EA 15 MIN 10/4/2019 Mckayla Perdomo PTA GP 1    59889436881 HC PT THER PROC EA 15 MIN 10/4/2019 Mckayla Perdomo PTA GP 1                    Mckayla Perdomo PTA  10/4/2019     "

## 2019-10-07 ENCOUNTER — HOSPITAL ENCOUNTER (OUTPATIENT)
Dept: PHYSICAL THERAPY | Facility: HOSPITAL | Age: 69
Setting detail: THERAPIES SERIES
Discharge: HOME | End: 2019-10-07

## 2019-10-07 DIAGNOSIS — Z96.651 STATUS POST TOTAL RIGHT KNEE REPLACEMENT: Primary | ICD-10-CM

## 2019-10-07 PROCEDURE — 97140 MANUAL THERAPY 1/> REGIONS: CPT

## 2019-10-07 PROCEDURE — 97110 THERAPEUTIC EXERCISES: CPT

## 2019-10-07 NOTE — THERAPY TREATMENT NOTE
Outpatient Physical Therapy Ortho Treatment Note   Linda Holm     Patient Name: Jefry Sabillon  : 1950  MRN: [id number]  Today's Date: 10/7/2019      Visit Date: 10/07/2019    Visit Dx:    ICD-10-CM ICD-9-CM   1. Status post total right knee replacement Z96.651 V43.65       Patient Active Problem List   Diagnosis   • Primary osteoarthritis of knees, bilateral   • Acute dyspnea   • Pericarditis   • Essential hypertension   • Mixed hyperlipidemia   • Primary osteoarthritis of right knee   • Status post total right knee replacement        Past Medical History:   Diagnosis Date   • Arthritis    • Asthma    • Cardiac disease    • Coronary artery disease    • Diabetes mellitus (CMS/HCC)     take Metformin   • Heart disease    • Hypertension    • Knee sprain    • Mini stroke (CMS/HCC)    • EVE (obstructive sleep apnea)    • Rotator cuff syndrome         Past Surgical History:   Procedure Laterality Date   • HERNIA REPAIR     • KNEE SURGERY  1970   • TOTAL KNEE ARTHROPLASTY Right 2019    Procedure: TOTAL KNEE ARTHROPLASTY AND ALL ASSOCIATED PROCEDURES;  Surgeon: Lopez Mejias MD;  Location: [facility];  Service: Orthopedics                       PT Assessment/Plan     Row Name 10/07/19 1200          PT Assessment    Assessment Comments  Pt continues to progress well with prescribed exercises. Instructed to pt different stretches to help improved knee flexion. Such stretches include seated heel slides and standing knee flexion at step.   -KM        PT Plan    PT Plan Comments  Continue pushing ROM, try supine quad stretch with heat pre-treatment session.   -KM       User Key  (r) = Recorded By, (t) = Taken By, (c) = Cosigned By    Initials Name Provider Type    Mckayla Yu PTA Physical Therapy Assistant            OP Exercises     Row Name 10/07/19 1005             Subjective Comments    Subjective Comments  Pt states his knee feels good and is not experiencing any pain. States he continues to  "notice tightness within his quad.   -KM         Exercise 1    Exercise Name 1  patella mobilizations  -KM      Cueing 1  Tactile  -KM      Time 1  5 min  -KM         Exercise 2    Exercise Name 2  Heel slides  -KM      Cueing 2  Verbal;Tactile  -KM      Time 2  10 min  -KM         Exercise 3    Exercise Name 3  Wall Slides  -KM      Cueing 3  Verbal;Tactile  -KM      Time 3  10 min  -KM         Exercise 4    Exercise Name 4  Passive knee flexion stretch  -KM      Cueing 4  Verbal;Tactile  -KM      Reps 4  10  -KM      Time 4  10 secs  -KM         Exercise 5    Exercise Name 5  Hamstring/gastroc stretch  -KM      Cueing 5  Verbal;Tactile  -KM      Time 5  5 min  -KM         Exercise 6    Exercise Name 6  Supine knee extension on roll  -KM      Cueing 6  Verbal  -KM      Time 6  5 min  -KM      Additional Comments  3#  -KM         Exercise 7    Exercise Name 7  Passive knee extension stretches  -KM      Cueing 7  Verbal;Tactile  -KM      Reps 7  10   -KM      Time 7  10 sec  -KM         Exercise 8    Exercise Name 8  QS with Russian Stim  -KM      Cueing 8  Verbal;Tactile  -KM      Time 8  10 min 10/10  -KM         Exercise 9    Exercise Name 9  SLR  -KM      Cueing 9  Verbal;Tactile  -KM      Reps 9  35  -KM      Time 9  3#  -KM         Exercise 10    Exercise Name 10  Hip ABD  -KM      Cueing 10  Verbal;Tactile  -KM      Reps 10  35  -KM      Time 10  2#  -KM         Exercise 11    Exercise Name 11  SAQ  -KM      Cueing 11  Verbal;Tactile  -KM         Exercise 12    Exercise Name 12  LAQ  -KM      Cueing 12  Verbal;Tactile  -KM      Reps 12  35  -KM      Time 12  2#  -KM         Exercise 13    Exercise Name 13  Heel Raises  -KM      Cueing 13  Verbal;Demo  -KM      Reps 13  40  -KM         Exercise 14    Exercise Name 14  Partial squats  -KM      Cueing 14  Verbal;Demo  -KM      Reps 14  30  -KM         Exercise 15    Exercise Name 15  6\" Fwd Step Ups  -KM      Cueing 15  Verbal;Demo  -KM      Reps 15  40x  -KM      " "   Exercise 16    Exercise Name 16  TKE vs TB  -KM      Reps 16  40  -KM      Time 16  Gold  -KM         Exercise 17    Exercise Name 17  Bike  -KM      Time 17  5 min  -KM         Exercise 18    Exercise Name 18  6\" Lat. Step Overs  -KM      Cueing 18  Verbal;Demo  -KM      Reps 18  20x  -KM         Exercise 19    Exercise Name 19  Knee Flexion stretch on step  -KM      Reps 19  10x  -KM        User Key  (r) = Recorded By, (t) = Taken By, (c) = Cosigned By    Initials Name Provider Type    Mckayla Yu PTA Physical Therapy Assistant                                          Time Calculation:   Start Time: 1005  Stop Time: 1136  Time Calculation (min): 91 min  Therapy Charges for Today     Code Description Service Date Service Provider Modifiers Qty    56334008632 HC PT THER PROC EA 15 MIN 10/7/2019 Mckayla Perdomo PTA GP 1    10866507483 HC PT MANUAL THERAPY EA 15 MIN 10/7/2019 Mckayla Perdomo PTA GP 1                    Mckayla Perdomo PTA  10/7/2019     "

## 2019-10-07 NOTE — THERAPY TREATMENT NOTE
Outpatient Physical Therapy Ortho Treatment Note   Linda Holm     Patient Name: Jefry Sabillon  : 1950  MRN: [id number]  Today's Date: 10/7/2019      Visit Date: 10/07/2019    Visit Dx:    ICD-10-CM ICD-9-CM   1. Status post total right knee replacement Z96.651 V43.65       Patient Active Problem List   Diagnosis   • Primary osteoarthritis of knees, bilateral   • Acute dyspnea   • Pericarditis   • Essential hypertension   • Mixed hyperlipidemia   • Primary osteoarthritis of right knee   • Status post total right knee replacement        Past Medical History:   Diagnosis Date   • Arthritis    • Asthma    • Cardiac disease    • Coronary artery disease    • Diabetes mellitus (CMS/HCC)     take Metformin   • Heart disease    • Hypertension    • Knee sprain    • Mini stroke (CMS/HCC)    • EVE (obstructive sleep apnea)    • Rotator cuff syndrome         Past Surgical History:   Procedure Laterality Date   • HERNIA REPAIR     • KNEE SURGERY  1970   • TOTAL KNEE ARTHROPLASTY Right 2019    Procedure: TOTAL KNEE ARTHROPLASTY AND ALL ASSOCIATED PROCEDURES;  Surgeon: Lopez Mejias MD;  Location: [facility];  Service: Orthopedics       PT Ortho     Row Name 10/07/19 1005       Right Lower Ext    Rt Knee Extension/Flexion AROM  0-2-82  -KM      User Key  (r) = Recorded By, (t) = Taken By, (c) = Cosigned By    Initials Name Provider Type    Mckayla Yu PTA Physical Therapy Assistant                      PT Assessment/Plan     Row Name 10/07/19 1200          PT Assessment    Assessment Comments  Pt continues to progress well with prescribed exercises. Instructed to pt different stretches to help improved knee flexion. Such stretches include seated heel slides and standing knee flexion at step.   -KM        PT Plan    PT Plan Comments  Continue pushing ROM, try supine quad stretch with heat pre-treatment session.   -KM       User Key  (r) = Recorded By, (t) = Taken By, (c) = Cosigned By    Initials Name  Provider Type    Mckayla Yu, ANGELITO Physical Therapy Assistant            OP Exercises     Row Name 10/07/19 1005             Subjective Comments    Subjective Comments  Pt states his knee feels good and is not experiencing any pain. States he continues to notice tightness within his quad.   -KM         Exercise 1    Exercise Name 1  patella mobilizations  -KM      Cueing 1  Tactile  -KM      Time 1  5 min  -KM         Exercise 2    Exercise Name 2  Heel slides  -KM      Cueing 2  Verbal;Tactile  -KM      Time 2  10 min  -KM         Exercise 3    Exercise Name 3  Wall Slides  -KM      Cueing 3  Verbal;Tactile  -KM      Time 3  10 min  -KM         Exercise 4    Exercise Name 4  Passive knee flexion stretch  -KM      Cueing 4  Verbal;Tactile  -KM      Reps 4  10  -KM      Time 4  10 secs  -KM         Exercise 5    Exercise Name 5  Hamstring/gastroc stretch  -KM      Cueing 5  Verbal;Tactile  -KM      Time 5  5 min  -KM         Exercise 6    Exercise Name 6  Supine knee extension on roll  -KM      Cueing 6  Verbal  -KM      Time 6  5 min  -KM      Additional Comments  3#  -KM         Exercise 7    Exercise Name 7  Passive knee extension stretches  -KM      Cueing 7  Verbal;Tactile  -KM      Reps 7  10   -KM      Time 7  10 sec  -KM         Exercise 8    Exercise Name 8  QS with Russian Stim  -KM      Cueing 8  Verbal;Tactile  -KM      Time 8  10 min 10/10  -KM         Exercise 9    Exercise Name 9  SLR  -KM      Cueing 9  Verbal;Tactile  -KM      Reps 9  35  -KM      Time 9  3#  -KM         Exercise 10    Exercise Name 10  Hip ABD  -KM      Cueing 10  Verbal;Tactile  -KM      Reps 10  35  -KM      Time 10  2#  -KM         Exercise 11    Exercise Name 11  SAQ  -KM      Cueing 11  Verbal;Tactile  -KM         Exercise 12    Exercise Name 12  LAQ  -KM      Cueing 12  Verbal;Tactile  -KM      Reps 12  35  -KM      Time 12  2#  -KM         Exercise 13    Exercise Name 13  Heel Raises  -KM      Cueing 13  Verbal;Demo  -KM  "     Reps 13  40  -KM         Exercise 14    Exercise Name 14  Partial squats  -KM      Cueing 14  Verbal;Demo  -KM      Reps 14  30  -KM         Exercise 15    Exercise Name 15  6\" Fwd Step Ups  -KM      Cueing 15  Verbal;Demo  -KM      Reps 15  40x  -KM         Exercise 16    Exercise Name 16  TKE vs TB  -KM      Reps 16  40  -KM      Time 16  Gold  -KM         Exercise 17    Exercise Name 17  Bike  -KM      Time 17  5 min  -KM         Exercise 18    Exercise Name 18  6\" Lat. Step Overs  -KM      Cueing 18  Verbal;Demo  -KM      Reps 18  20x  -KM         Exercise 19    Exercise Name 19  Knee Flexion stretch on step  -KM      Reps 19  10x  -KM        User Key  (r) = Recorded By, (t) = Taken By, (c) = Cosigned By    Initials Name Provider Type    Mckayla Yu PTA Physical Therapy Assistant                                          Time Calculation:   Start Time: 1005  Stop Time: 1136  Time Calculation (min): 91 min  Therapy Charges for Today     Code Description Service Date Service Provider Modifiers Qty    51277961709 HC PT THER PROC EA 15 MIN 10/7/2019 Mckayla Perdomo PTA GP 1    83518727431 HC PT MANUAL THERAPY EA 15 MIN 10/7/2019 Mckayla Perdomo PTA GP 1                    Mckayla Perdomo PTA  10/7/2019     "

## 2019-10-09 ENCOUNTER — HOSPITAL ENCOUNTER (OUTPATIENT)
Dept: PHYSICAL THERAPY | Facility: HOSPITAL | Age: 69
Setting detail: THERAPIES SERIES
Discharge: HOME | End: 2019-10-09

## 2019-10-09 DIAGNOSIS — Z96.651 STATUS POST TOTAL RIGHT KNEE REPLACEMENT: Primary | ICD-10-CM

## 2019-10-09 PROCEDURE — 97110 THERAPEUTIC EXERCISES: CPT

## 2019-10-09 NOTE — THERAPY TREATMENT NOTE
Outpatient Physical Therapy Ortho Treatment Note   Linda Holm     Patient Name: Jefry Sabillon  : 1950  MRN: [id number]  Today's Date: 10/9/2019      Visit Date: 10/09/2019    Visit Dx:    ICD-10-CM ICD-9-CM   1. Status post total right knee replacement Z96.651 V43.65       Patient Active Problem List   Diagnosis   • Primary osteoarthritis of knees, bilateral   • Acute dyspnea   • Pericarditis   • Essential hypertension   • Mixed hyperlipidemia   • Primary osteoarthritis of right knee   • Status post total right knee replacement        Past Medical History:   Diagnosis Date   • Arthritis    • Asthma    • Cardiac disease    • Coronary artery disease    • Diabetes mellitus (CMS/HCC)     take Metformin   • Heart disease    • Hypertension    • Knee sprain    • Mini stroke (CMS/HCC)    • EVE (obstructive sleep apnea)    • Rotator cuff syndrome         Past Surgical History:   Procedure Laterality Date   • HERNIA REPAIR     • KNEE SURGERY  1970   • TOTAL KNEE ARTHROPLASTY Right 2019    Procedure: TOTAL KNEE ARTHROPLASTY AND ALL ASSOCIATED PROCEDURES;  Surgeon: Lopez Mejias MD;  Location: [facility];  Service: Orthopedics       PT Ortho     Row Name 10/07/19 1005       Right Lower Ext    Rt Knee Extension/Flexion AROM  0-2-82  -KM      User Key  (r) = Recorded By, (t) = Taken By, (c) = Cosigned By    Initials Name Provider Type    Mckayla Yu PTA Physical Therapy Assistant                      PT Assessment/Plan     Row Name 10/09/19 1000          PT Assessment    Assessment Comments  Postitive response reported with starting MHP prior to stretching. Pt continues to demonstrate improved overall strenth, however, knee flexion ROM seems to be a limiting factor.   -KM        PT Plan    PT Plan Comments  Continue pushing ROM. Progress as tolerated.   -KM       User Key  (r) = Recorded By, (t) = Taken By, (c) = Cosigned By    Initials Name Provider Type    Mckayla Yu PTA Physical Therapy  Assistant          Modalities     Row Name 10/09/19 1000             Subjective Comments    Subjective Comments  Pt states his knee is a little sore from increased activity yesterday. States his completed additional walking outside and worked on bending his knee by rocking in a rocking chair.   -KM         Moist Heat    MH Applied  Yes  -KM      Location  (R) quad -supine in hip flexor stretch  -KM      Rx Minutes  10 mins  -KM      MH Prior to Rx  Yes  -KM        User Key  (r) = Recorded By, (t) = Taken By, (c) = Cosigned By    Initials Name Provider Type    Mckayla Yu PTA Physical Therapy Assistant        OP Exercises     Row Name 10/09/19 1000             Subjective Comments    Subjective Comments  Pt states his knee is a little sore from increased activity yesterday. States his completed additional walking outside and worked on bending his knee by rocking in a rocking chair.   -KM         Exercise 1    Exercise Name 1  patella mobilizations  -KM      Cueing 1  Tactile  -KM      Time 1  5 min  -KM         Exercise 2    Exercise Name 2  Heel slides  -KM      Cueing 2  Verbal;Tactile  -KM      Time 2  10 min  -KM         Exercise 3    Exercise Name 3  Wall Slides  -KM      Cueing 3  Verbal;Tactile  -KM      Time 3  10 min  -KM         Exercise 4    Exercise Name 4  Passive knee flexion stretch  -KM      Cueing 4  Verbal;Tactile  -KM      Reps 4  10  -KM      Time 4  10 secs  -KM         Exercise 5    Exercise Name 5  Hamstring/gastroc stretch  -KM      Cueing 5  Verbal;Tactile  -KM      Time 5  5 min  -KM         Exercise 6    Exercise Name 6  Supine knee extension on roll  -KM      Cueing 6  Verbal  -KM      Time 6  5 min  -KM      Additional Comments  3#  -KM         Exercise 7    Exercise Name 7  Passive knee extension stretches  -KM      Cueing 7  Verbal;Tactile  -KM      Reps 7  10   -KM      Time 7  10 sec  -KM         Exercise 8    Exercise Name 8  QS with Russian Stim  -KM      Cueing 8   "Verbal;Tactile  -KM      Time 8  10 min 10/10  -KM         Exercise 9    Exercise Name 9  SLR  -KM      Cueing 9  Verbal;Tactile  -KM      Reps 9  35  -KM      Time 9  3#  -KM         Exercise 10    Exercise Name 10  Hip ABD  -KM      Cueing 10  Verbal;Tactile  -KM      Reps 10  35  -KM      Time 10  2#  -KM         Exercise 11    Exercise Name 11  SAQ  -KM      Cueing 11  Verbal;Tactile  -KM         Exercise 12    Exercise Name 12  LAQ  -KM      Cueing 12  Verbal;Tactile  -KM      Reps 12  35  -KM      Time 12  2#  -KM         Exercise 13    Exercise Name 13  Heel Raises  -KM      Cueing 13  Verbal;Demo  -KM      Reps 13  40  -KM         Exercise 14    Exercise Name 14  Partial squats  -KM      Cueing 14  Verbal;Demo  -KM      Reps 14  30  -KM         Exercise 15    Exercise Name 15  6\" Fwd Step Ups  -KM      Cueing 15  Verbal;Demo  -KM      Reps 15  40x  -KM         Exercise 16    Exercise Name 16  TKE vs TB  -KM      Reps 16  40  -KM      Time 16  Gold  -KM         Exercise 17    Exercise Name 17  Bike  -KM      Time 17  5 min  -KM         Exercise 18    Exercise Name 18  6\" Lat. Step Overs  -KM      Cueing 18  Verbal;Demo  -KM      Reps 18  25x  -KM         Exercise 19    Exercise Name 19  Knee Flexion stretch on step  -KM         Exercise 20    Exercise Name 20  2\" Lateral Dip  -KM      Reps 20  15  -KM        User Key  (r) = Recorded By, (t) = Taken By, (c) = Cosigned By    Initials Name Provider Type    Mckayla Yu PTA Physical Therapy Assistant                                          Time Calculation:   Start Time: 1000  Stop Time: 1120  Time Calculation (min): 80 min  Therapy Charges for Today     Code Description Service Date Service Provider Modifiers Qty    23081993017  PT THER PROC EA 15 MIN 10/9/2019 Mckayla Perdomo PTA GP 1                    Mckayla Perdomo PTA  10/9/2019     "

## 2019-10-11 ENCOUNTER — HOSPITAL ENCOUNTER (OUTPATIENT)
Dept: PHYSICAL THERAPY | Facility: HOSPITAL | Age: 69
Setting detail: THERAPIES SERIES
Discharge: HOME | End: 2019-10-11

## 2019-10-11 DIAGNOSIS — Z96.651 STATUS POST TOTAL RIGHT KNEE REPLACEMENT: Primary | ICD-10-CM

## 2019-10-11 PROCEDURE — 97140 MANUAL THERAPY 1/> REGIONS: CPT

## 2019-10-11 PROCEDURE — 97110 THERAPEUTIC EXERCISES: CPT

## 2019-10-11 NOTE — THERAPY TREATMENT NOTE
Outpatient Physical Therapy Ortho Treatment Note   Linda Holm     Patient Name: Jefry Sabillon  : 1950  MRN: [id number]  Today's Date: 10/11/2019      Visit Date: 10/11/2019    Visit Dx:    ICD-10-CM ICD-9-CM   1. Status post total right knee replacement Z96.651 V43.65       Patient Active Problem List   Diagnosis   • Primary osteoarthritis of knees, bilateral   • Acute dyspnea   • Pericarditis   • Essential hypertension   • Mixed hyperlipidemia   • Primary osteoarthritis of right knee   • Status post total right knee replacement        Past Medical History:   Diagnosis Date   • Arthritis    • Asthma    • Cardiac disease    • Coronary artery disease    • Diabetes mellitus (CMS/HCC)     take Metformin   • Heart disease    • Hypertension    • Knee sprain    • Mini stroke (CMS/HCC)    • EVE (obstructive sleep apnea)    • Rotator cuff syndrome         Past Surgical History:   Procedure Laterality Date   • HERNIA REPAIR     • KNEE SURGERY     • TOTAL KNEE ARTHROPLASTY Right 2019    Procedure: TOTAL KNEE ARTHROPLASTY AND ALL ASSOCIATED PROCEDURES;  Surgeon: Lopez Mejias MD;  Location: [facility];  Service: Orthopedics       PT Ortho     Row Name 10/11/19 1000       Right Lower Ext    Rt Knee Extension/Flexion AROM  0-1-82  -KM      User Key  (r) = Recorded By, (t) = Taken By, (c) = Cosigned By    Initials Name Provider Type    Mckayla Yu PTA Physical Therapy Assistant                      PT Assessment/Plan     Row Name 10/11/19 1000          PT Assessment    Assessment Comments  Pt continues to progress well and demonstrates improved function. Limited AROM measure post stretch.   -KM        PT Plan    PT Plan Comments  Pt to complete HEP. Continue to push ROM  -KM       User Key  (r) = Recorded By, (t) = Taken By, (c) = Cosigned By    Initials Name Provider Type    Mckayla Yu PTA Physical Therapy Assistant          Modalities     Row Name 10/11/19 1000             Moist Heat     MH Applied  Yes  -KM      Location  (R) quad -supine in hip flexor stretch  -KM      Rx Minutes  10 mins  -KM      MH Prior to Rx  Yes  -KM        User Key  (r) = Recorded By, (t) = Taken By, (c) = Cosigned By    Initials Name Provider Type    Mckayla Yu, ANGELITO Physical Therapy Assistant        OP Exercises     Row Name 10/11/19 1000             Subjective Comments    Subjective Comments  Pt states his knee continues to do well and feels like he is walking better.   -KM         Exercise 1    Exercise Name 1  patella mobilizations  -KM      Cueing 1  Tactile  -KM      Time 1  5 min  -KM         Exercise 2    Exercise Name 2  Heel slides  -KM      Cueing 2  Verbal;Tactile  -KM      Time 2  10 min  -KM         Exercise 3    Exercise Name 3  Wall Slides  -KM      Cueing 3  Verbal;Tactile  -KM      Time 3  10 min  -KM         Exercise 4    Exercise Name 4  Passive knee flexion stretch  -KM      Cueing 4  Verbal;Tactile  -KM      Reps 4  10  -KM      Time 4  10 secs  -KM         Exercise 5    Exercise Name 5  Hamstring/gastroc stretch  -KM      Cueing 5  Verbal;Tactile  -KM      Time 5  5 min  -KM         Exercise 6    Exercise Name 6  Supine knee extension on roll  -KM      Cueing 6  Verbal  -KM      Time 6  5 min  -KM      Additional Comments  4#  -KM         Exercise 7    Exercise Name 7  Passive knee extension stretches  -KM      Cueing 7  Verbal;Tactile  -KM      Reps 7  10   -KM      Time 7  10 sec  -KM         Exercise 8    Exercise Name 8  QS with Russian Stim  -KM      Cueing 8  Verbal;Tactile  -KM      Time 8  10 min 10/10  -KM         Exercise 9    Exercise Name 9  SLR  -KM      Cueing 9  Verbal;Tactile  -KM      Reps 9  35  -KM      Time 9  3#  -KM         Exercise 10    Exercise Name 10  Hip ABD  -KM      Cueing 10  Verbal;Tactile  -KM      Reps 10  35  -KM      Time 10  2#  -KM         Exercise 11    Exercise Name 11  SAQ  -KM      Cueing 11  Verbal;Tactile  -KM         Exercise 12    Exercise Name  "12  LAQ  -KM      Cueing 12  Verbal;Tactile  -KM      Reps 12  35  -KM      Time 12  2#  -KM         Exercise 13    Exercise Name 13  Heel Raises  -KM      Cueing 13  Verbal;Demo  -KM      Reps 13  40  -KM         Exercise 14    Exercise Name 14  Partial squats  -KM      Cueing 14  Verbal;Demo  -KM      Reps 14  30  -KM         Exercise 15    Exercise Name 15  6\" Fwd Step Ups  -KM      Cueing 15  Verbal;Demo  -KM      Reps 15  40x  -KM         Exercise 16    Exercise Name 16  TKE vs TB  -KM      Reps 16  40  -KM      Time 16  Gold  -KM         Exercise 17    Exercise Name 17  Bike  -KM      Time 17  5 min  -KM      Additional Comments  seat 3  -KM         Exercise 18    Exercise Name 18  6\" Lat. Step Overs  -KM      Cueing 18  Verbal;Demo  -KM      Reps 18  25x  -KM         Exercise 19    Exercise Name 19  Knee Flexion stretch on step  -KM         Exercise 20    Exercise Name 20  2\" Lateral Dip  -KM      Reps 20  15  -KM        User Key  (r) = Recorded By, (t) = Taken By, (c) = Cosigned By    Initials Name Provider Type    Mckayla Yu PTA Physical Therapy Assistant                                          Time Calculation:   Start Time: 1000  Stop Time: 1135  Time Calculation (min): 95 min  Therapy Charges for Today     Code Description Service Date Service Provider Modifiers Qty    58872433812 HC PT MANUAL THERAPY EA 15 MIN 10/11/2019 Mckayla Perdomo PTA GP 1    91546224023 HC PT THER PROC EA 15 MIN 10/11/2019 Mckayla Perdomo PTA GP 1                    Mckayla Perdomo PTA  10/11/2019     "

## 2019-10-14 ENCOUNTER — HOSPITAL ENCOUNTER (OUTPATIENT)
Dept: PHYSICAL THERAPY | Facility: HOSPITAL | Age: 69
Setting detail: THERAPIES SERIES
Discharge: HOME | End: 2019-10-14

## 2019-10-14 DIAGNOSIS — Z96.651 STATUS POST TOTAL RIGHT KNEE REPLACEMENT: Primary | ICD-10-CM

## 2019-10-14 PROCEDURE — 97110 THERAPEUTIC EXERCISES: CPT

## 2019-10-14 NOTE — THERAPY TREATMENT NOTE
Outpatient Physical Therapy Ortho Treatment Note   Linda Holm     Patient Name: Jefry Sabillon  : 1950  MRN: [id number]  Today's Date: 10/14/2019      Visit Date: 10/14/2019    Visit Dx:    ICD-10-CM ICD-9-CM   1. Status post total right knee replacement Z96.651 V43.65       Patient Active Problem List   Diagnosis   • Primary osteoarthritis of knees, bilateral   • Acute dyspnea   • Pericarditis   • Essential hypertension   • Mixed hyperlipidemia   • Primary osteoarthritis of right knee   • Status post total right knee replacement        Past Medical History:   Diagnosis Date   • Arthritis    • Asthma    • Cardiac disease    • Coronary artery disease    • Diabetes mellitus (CMS/[facility])     take Metformin   • Heart disease    • Hypertension    • Knee sprain    • Mini stroke (CMS/[facility])    • EVE (obstructive sleep apnea)    • Rotator cuff syndrome         Past Surgical History:   Procedure Laterality Date   • HERNIA REPAIR     • KNEE SURGERY  1970   • TOTAL KNEE ARTHROPLASTY Right 2019    Procedure: TOTAL KNEE ARTHROPLASTY AND ALL ASSOCIATED PROCEDURES;  Surgeon: Lopez Mejias MD;  Location: [facility];  Service: Orthopedics                       PT Assessment/Plan     Row Name 10/14/19 1000          PT Assessment    Assessment Comments  Minimal improvement noted with knee flexion passively. Pt continues to tolerate progression of ther ex well .   -KM        PT Plan    PT Plan Comments  Continue per POC. Measurements for MD appointment Friday.   -KM       User Key  (r) = Recorded By, (t) = Taken By, (c) = Cosigned By    Initials Name Provider Type    Mckayla Yu PTA Physical Therapy Assistant          Modalities     Row Name 10/14/19 1000             Subjective Comments    Subjective Comments  Pt states his knee feels good with no new complaints.   -KM         Moist Heat    MH Applied  Yes  -KM      Location  (R) quad -supine in hip flexor stretch  -KM      Rx Minutes  10 mins  -KM      MH  Prior to Rx  Yes  -KM        User Key  (r) = Recorded By, (t) = Taken By, (c) = Cosigned By    Initials Name Provider Type    Mckayla Yu, PTA Physical Therapy Assistant        OP Exercises     Row Name 10/14/19 1000             Subjective Comments    Subjective Comments  Pt states his knee feels good with no new complaints.   -KM         Exercise 1    Exercise Name 1  patella mobilizations  -KM      Cueing 1  Tactile  -KM      Time 1  5 min  -KM         Exercise 2    Exercise Name 2  Heel slides  -KM      Cueing 2  Verbal;Tactile  -KM      Time 2  10 min  -KM         Exercise 3    Exercise Name 3  Wall Slides  -KM      Cueing 3  Verbal;Tactile  -KM      Time 3  10 min  -KM         Exercise 4    Exercise Name 4  Passive knee flexion stretch  -KM      Cueing 4  Verbal;Tactile  -KM      Reps 4  10  -KM      Time 4  10 secs  -KM         Exercise 5    Exercise Name 5  Hamstring/gastroc stretch  -KM      Cueing 5  Verbal;Tactile  -KM      Time 5  5 min  -KM         Exercise 6    Exercise Name 6  Supine knee extension on roll  -KM      Cueing 6  Verbal  -KM      Time 6  5 min  -KM      Additional Comments  4#  -KM         Exercise 7    Exercise Name 7  Passive knee extension stretches  -KM      Cueing 7  Verbal;Tactile  -KM      Reps 7  10   -KM      Time 7  10 sec  -KM         Exercise 8    Exercise Name 8  QS with Russian Stim  -KM      Cueing 8  Verbal;Tactile  -KM      Time 8  10 min 10/10  -KM         Exercise 9    Exercise Name 9  SLR  -KM      Cueing 9  Verbal;Tactile  -KM      Reps 9  35  -KM      Time 9  3#  -KM         Exercise 10    Exercise Name 10  Hip ABD  -KM      Cueing 10  Verbal;Tactile  -KM      Reps 10  35  -KM      Time 10  2#  -KM         Exercise 11    Exercise Name 11  SAQ  -KM      Cueing 11  Verbal;Tactile  -KM         Exercise 12    Exercise Name 12  LAQ  -KM      Cueing 12  Verbal;Tactile  -KM      Reps 12  35  -KM      Time 12  2#  -KM         Exercise 13    Exercise Name 13  Heel Raises  " -KM      Cueing 13  Verbal;Demo  -KM      Reps 13  40  -KM         Exercise 14    Exercise Name 14  Partial squats  -KM      Cueing 14  Verbal;Demo  -KM      Reps 14  30  -KM         Exercise 15    Exercise Name 15  6\" Fwd Step Ups  -KM      Cueing 15  Verbal;Demo  -KM      Reps 15  45x  -KM         Exercise 16    Exercise Name 16  TKE vs TB  -KM      Reps 16  40  -KM      Time 16  Gold  -KM         Exercise 17    Exercise Name 17  Bike  -KM      Time 17  5 min  -KM      Additional Comments  seat 3  -KM         Exercise 18    Exercise Name 18  6\" Lat. Step Overs  -KM      Cueing 18  Verbal;Demo  -KM      Reps 18  25x  -KM         Exercise 19    Exercise Name 19  Knee Flexion stretch on step  -KM      Reps 19  10x  -KM         Exercise 20    Exercise Name 20  4\" Lateral Dip  -KM      Reps 20  15  -KM        User Key  (r) = Recorded By, (t) = Taken By, (c) = Cosigned By    Initials Name Provider Type     Mckayla Perdomo PTA Physical Therapy Assistant                                          Time Calculation:   Start Time: 1000  Stop Time: 1120  Time Calculation (min): 80 min  Therapy Charges for Today     Code Description Service Date Service Provider Modifiers Qty    97207828252 HC PT THER PROC EA 15 MIN 10/14/2019 Mckayla Perdomo PTA GP 1                    Mckayla Perdomo PTA  10/14/2019     "

## 2019-10-16 ENCOUNTER — HOSPITAL ENCOUNTER (OUTPATIENT)
Dept: PHYSICAL THERAPY | Facility: HOSPITAL | Age: 69
Setting detail: THERAPIES SERIES
Discharge: HOME | End: 2019-10-16

## 2019-10-16 DIAGNOSIS — Z96.651 STATUS POST TOTAL RIGHT KNEE REPLACEMENT: Primary | ICD-10-CM

## 2019-10-16 PROCEDURE — 97110 THERAPEUTIC EXERCISES: CPT

## 2019-10-16 PROCEDURE — 97140 MANUAL THERAPY 1/> REGIONS: CPT

## 2019-10-16 NOTE — THERAPY TREATMENT NOTE
Outpatient Physical Therapy Ortho Treatment Note   Linda Holm     Patient Name: Jefry Sabillon  : 1950  MRN: [id number]  Today's Date: 10/16/2019      Visit Date: 10/16/2019    Visit Dx:    ICD-10-CM ICD-9-CM   1. Status post total right knee replacement Z96.651 V43.65       Patient Active Problem List   Diagnosis   • Primary osteoarthritis of knees, bilateral   • Acute dyspnea   • Pericarditis   • Essential hypertension   • Mixed hyperlipidemia   • Primary osteoarthritis of right knee   • Status post total right knee replacement        Past Medical History:   Diagnosis Date   • Arthritis    • Asthma    • Cardiac disease    • Coronary artery disease    • Diabetes mellitus (CMS/[facility])     take Metformin   • Heart disease    • Hypertension    • Knee sprain    • Mini stroke (CMS/[facility])    • EVE (obstructive sleep apnea)    • Rotator cuff syndrome         Past Surgical History:   Procedure Laterality Date   • HERNIA REPAIR     • KNEE SURGERY     • TOTAL KNEE ARTHROPLASTY Right 2019    Procedure: TOTAL KNEE ARTHROPLASTY AND ALL ASSOCIATED PROCEDURES;  Surgeon: Lopez Mejias MD;  Location: [facility];  Service: Orthopedics                       PT Assessment/Plan     Row Name 10/16/19 8361          PT Assessment    Assessment Comments  Pt practiced ascending/descending stairs with step over method. Pt has difficulty descending leading with the (L) foot due to decreased (R) knee flexion. Pt continues to progress well with ther ex completing additional repetitions.   -KM        PT Plan    PT Plan Comments  Continue per POC. Measurements for MD appointment.   -KM       User Key  (r) = Recorded By, (t) = Taken By, (c) = Cosigned By    Initials Name Provider Type    Mckayla Yu PTA Physical Therapy Assistant          Modalities     Row Name 10/16/19 7223             Subjective Comments    Subjective Comments  Pt reports his knee is doing well, states he feels some sharp, tingly pain thinking the  nerves are coming back.   -KM         Moist Heat    MH Applied  Yes  -KM      Location  (R) quad -supine in hip flexor stretch  -KM      Rx Minutes  10 mins  -KM      MH Prior to Rx  Yes  -KM        User Key  (r) = Recorded By, (t) = Taken By, (c) = Cosigned By    Initials Name Provider Type    Mckayla Yu PTA Physical Therapy Assistant        OP Exercises     Row Name 10/16/19 0955             Subjective Comments    Subjective Comments  Pt reports his knee is doing well, states he feels some sharp, tingly pain thinking the nerves are coming back.   -KM         Exercise 1    Exercise Name 1  patella mobilizations  -KM      Cueing 1  Tactile  -KM      Time 1  5 min  -KM         Exercise 2    Exercise Name 2  Heel slides  -KM      Cueing 2  Verbal;Tactile  -KM      Time 2  10 min  -KM         Exercise 3    Exercise Name 3  Wall Slides  -KM      Cueing 3  Verbal;Tactile  -KM      Time 3  10 min  -KM         Exercise 4    Exercise Name 4  Passive knee flexion stretch  -KM      Cueing 4  Verbal;Tactile  -KM      Reps 4  10  -KM      Time 4  10 secs  -KM         Exercise 5    Exercise Name 5  Hamstring/gastroc stretch  -KM      Cueing 5  Verbal;Tactile  -KM      Time 5  5 min  -KM         Exercise 6    Exercise Name 6  Supine knee extension on roll  -KM      Cueing 6  Verbal  -KM      Time 6  5 min  -KM         Exercise 7    Exercise Name 7  Passive knee extension stretches  -KM      Cueing 7  Verbal;Tactile  -KM      Reps 7  10   -KM      Time 7  10 sec  -KM         Exercise 8    Exercise Name 8  QS with Russian Stim  -KM      Cueing 8  Verbal;Tactile  -KM      Time 8  10 min 10/10  -KM         Exercise 9    Exercise Name 9  SLR  -KM      Cueing 9  Verbal;Tactile  -KM      Reps 9  40  -KM      Time 9  3#  -KM         Exercise 10    Exercise Name 10  Hip ABD  -KM      Cueing 10  Verbal;Tactile  -KM      Reps 10  40  -KM      Time 10  2#  -KM         Exercise 11    Exercise Name 11  SAQ  -KM      Cueing 11   "Verbal;Tactile  -KM         Exercise 12    Exercise Name 12  LAQ  -KM      Cueing 12  Verbal;Tactile  -KM      Reps 12  40  -KM      Time 12  2#  -KM         Exercise 13    Exercise Name 13  Heel Raises  -KM      Cueing 13  Verbal;Demo  -KM      Reps 13  40  -KM         Exercise 14    Exercise Name 14  Partial squats  -KM      Cueing 14  Verbal;Demo  -KM      Reps 14  30  -KM         Exercise 15    Exercise Name 15  6\" Fwd Step Ups  -KM      Cueing 15  Verbal;Demo  -KM      Reps 15  45x  -KM         Exercise 16    Exercise Name 16  TKE vs TB  -KM      Reps 16  40  -KM      Time 16  Gold  -KM         Exercise 17    Exercise Name 17  Bike  -KM      Time 17  5 min  -KM      Additional Comments  seat 3  -KM         Exercise 18    Exercise Name 18  6\" Lat. Step Overs  -KM      Cueing 18  Verbal;Demo  -KM      Reps 18  25x  -KM         Exercise 19    Exercise Name 19  Knee Flexion stretch on step  -KM      Reps 19  10x  -KM         Exercise 20    Exercise Name 20  4\" Lateral Dip  -KM      Reps 20  15  -KM        User Key  (r) = Recorded By, (t) = Taken By, (c) = Cosigned By    Initials Name Provider Type    Mckayla Yu PTA Physical Therapy Assistant                                          Time Calculation:   Start Time: 0955  Stop Time: 1120  Time Calculation (min): 85 min  Therapy Charges for Today     Code Description Service Date Service Provider Modifiers Qty    31135497516 HC PT MANUAL THERAPY EA 15 MIN 10/16/2019 Mckayla Perdomo PTA GP 1    81068876415 HC PT THER PROC EA 15 MIN 10/16/2019 Mckayla Perdomo PTA GP 1                    Mckayla Perdomo PTA  10/16/2019     "

## 2019-10-18 ENCOUNTER — HOSPITAL ENCOUNTER (OUTPATIENT)
Dept: PHYSICAL THERAPY | Facility: HOSPITAL | Age: 69
Setting detail: THERAPIES SERIES
Discharge: HOME | End: 2019-10-18

## 2019-10-18 ENCOUNTER — OFFICE VISIT (OUTPATIENT)
Dept: ORTHOPEDIC SURGERY | Facility: CLINIC | Age: 69
End: 2019-10-18

## 2019-10-18 VITALS — WEIGHT: 282 LBS | HEIGHT: 72 IN | BODY MASS INDEX: 38.19 KG/M2

## 2019-10-18 DIAGNOSIS — Z96.651 STATUS POST TOTAL RIGHT KNEE REPLACEMENT: Primary | ICD-10-CM

## 2019-10-18 DIAGNOSIS — Z96.651 STATUS POST TOTAL RIGHT KNEE REPLACEMENT: ICD-10-CM

## 2019-10-18 DIAGNOSIS — R52 PAIN: Primary | ICD-10-CM

## 2019-10-18 PROCEDURE — 99024 POSTOP FOLLOW-UP VISIT: CPT | Performed by: ORTHOPAEDIC SURGERY

## 2019-10-18 PROCEDURE — 97140 MANUAL THERAPY 1/> REGIONS: CPT

## 2019-10-18 PROCEDURE — 73562 X-RAY EXAM OF KNEE 3: CPT | Performed by: ORTHOPAEDIC SURGERY

## 2019-10-18 PROCEDURE — 97110 THERAPEUTIC EXERCISES: CPT

## 2019-10-18 RX ORDER — METHYLPREDNISOLONE 4 MG/1
TABLET ORAL
Qty: 1 EACH | Refills: 0 | Status: ON HOLD | OUTPATIENT
Start: 2019-10-18 | End: 2019-11-11

## 2019-10-18 NOTE — THERAPY TREATMENT NOTE
Outpatient Physical Therapy Ortho Treatment Note   Linda Holm     Patient Name: Jefry Sabillon  : 1950  MRN: [id number]  Today's Date: 10/18/2019      Visit Date: 10/18/2019    Visit Dx:    ICD-10-CM ICD-9-CM   1. Status post total right knee replacement Z96.651 V43.65       Patient Active Problem List   Diagnosis   • Primary osteoarthritis of knees, bilateral   • Acute dyspnea   • Pericarditis   • Essential hypertension   • Mixed hyperlipidemia   • Primary osteoarthritis of right knee   • Status post total right knee replacement        Past Medical History:   Diagnosis Date   • Arthritis    • Asthma    • Cardiac disease    • Coronary artery disease    • Diabetes mellitus (CMS/HCC)     take Metformin   • Heart disease    • Hypertension    • Knee sprain    • Mini stroke (CMS/HCC)    • EVE (obstructive sleep apnea)    • Rotator cuff syndrome         Past Surgical History:   Procedure Laterality Date   • HERNIA REPAIR     • KNEE SURGERY     • TOTAL KNEE ARTHROPLASTY Right 2019    Procedure: TOTAL KNEE ARTHROPLASTY AND ALL ASSOCIATED PROCEDURES;  Surgeon: Lopez Mejias MD;  Location: [facility];  Service: Orthopedics       PT Ortho     Row Name 10/18/19 0900       Patellar Accessory Motions    Superior glide  Right:;WNL  -GC    Inferior glide  Right:;WNL  -GC    Medial glide  Right:;WNL  -GC    Lateral glide  Right:;WNL  -GC       Right Lower Ext    Rt Knee Extension/Flexion AROM  0-1-83 degrees after stretching  -GC       MMT Right Lower Ext    Rt Hip Flexion MMT, Gross Movement  (4+/5) good plus  -GC    Rt Hip Extension MMT, Gross Movement  (4+/5) good plus  -GC    Rt Hip ABduction MMT, Gross Movement  (5/5) normal  -GC    Rt Hip ADduction MMT, Gross Movement  (4+/5) good plus  -GC    Rt Knee Extension MMT, Gross Movement  (4+/5) good plus  -GC    Rt Knee Flexion MMT, Gross Movement  (4+/5) good plus  -GC    Rt Ankle Plantarflexion MMT, Gross Movement  (5/5) normal  -GC    Rt Ankle  Dorsiflexion MMT, Gross Movement  (5/5) normal  -GC       Lower Extremity Flexibility    Hamstrings  Right:;Mildly limited  -GC    Quadriceps  Right:;Moderately limited  -GC    Gastrocnemius  Right:;Mildly limited  -GC       Gait/Stairs Assessment/Training    Comment (Gait/Stairs)  Pt still ambulates with antalgic gait  -GC      User Key  (r) = Recorded By, (t) = Taken By, (c) = Cosigned By    Initials Name Provider Type     Jose Adam, PT Physical Therapist                      PT Assessment/Plan     Row Name 10/18/19 0900          PT Assessment    Assessment Comments  Pt is showing good increases in his LE strength, but his knee ROM is still very limited and not progressing very quickly.  -        PT Plan    PT Plan Comments  Pt is to continue his HEP daily. He has MD follow up this morning. Will continue as advised.  -       User Key  (r) = Recorded By, (t) = Taken By, (c) = Cosigned By    Initials Name Provider Type     Jose Adam, PT Physical Therapist            OP Exercises     Row Name 10/18/19 0900             Subjective Comments    Subjective Comments  Pt reports his knee is doing well and continues to complete HEP and additional walking at home.   -KM         Exercise 1    Exercise Name 1  patella mobilizations  -KM      Cueing 1  Tactile  -KM      Time 1  5 min  -KM         Exercise 2    Exercise Name 2  Heel slides  -KM      Cueing 2  Verbal;Tactile  -KM      Time 2  10 min  -KM         Exercise 3    Exercise Name 3  Wall Slides  -KM      Cueing 3  Verbal;Tactile  -KM      Time 3  10 min  -KM         Exercise 4    Exercise Name 4  Passive knee flexion stretch  -KM      Cueing 4  Verbal;Tactile  -KM      Reps 4  10  -KM      Time 4  10 secs  -KM         Exercise 5    Exercise Name 5  Hamstring/gastroc stretch  -KM      Cueing 5  Verbal;Tactile  -KM      Time 5  5 min  -KM         Exercise 6    Exercise Name 6  Supine knee extension on roll  -KM      Cueing 6  Verbal  -KM      Time 6  5  "min  -KM         Exercise 7    Exercise Name 7  Passive knee extension stretches  -KM      Cueing 7  Verbal;Tactile  -KM      Reps 7  10   -KM      Time 7  10 sec  -KM         Exercise 8    Exercise Name 8  QS with Russian Stim  -KM      Cueing 8  Verbal;Tactile  -KM      Time 8  10 min 10/10  -KM         Exercise 9    Exercise Name 9  SLR  -KM      Cueing 9  Verbal;Tactile  -KM      Reps 9  25  -KM      Time 9  4#  -KM         Exercise 10    Exercise Name 10  Hip ABD  -KM      Cueing 10  Verbal;Tactile  -KM      Reps 10  25  -KM      Time 10  4#  -KM         Exercise 11    Exercise Name 11  SAQ  -KM      Cueing 11  Verbal;Tactile  -KM         Exercise 12    Exercise Name 12  LAQ  -KM      Cueing 12  Verbal;Tactile  -KM      Reps 12  25  -KM      Time 12  4#  -KM         Exercise 13    Exercise Name 13  Heel Raises  -KM      Cueing 13  Verbal;Demo  -KM      Reps 13  40  -KM         Exercise 14    Exercise Name 14  Partial squats  -KM      Cueing 14  Verbal;Demo  -KM      Reps 14  30  -KM         Exercise 15    Exercise Name 15  6\" Fwd Step Ups  -KM      Cueing 15  Verbal;Demo  -KM      Reps 15  45x  -KM         Exercise 16    Exercise Name 16  TKE vs TB  -KM      Reps 16  40  -KM      Time 16  Gold  -KM         Exercise 17    Exercise Name 17  Bike  -KM      Time 17  5 min  -KM      Additional Comments  seat 3  -KM         Exercise 18    Exercise Name 18  6\" Lat. Step Overs  -KM      Cueing 18  Verbal;Demo  -KM      Reps 18  25x  -KM         Exercise 19    Exercise Name 19  Knee Flexion stretch on step  -KM      Reps 19  10x  -KM         Exercise 20    Exercise Name 20  4\" Lateral Dip  -KM      Reps 20  15  -KM        User Key  (r) = Recorded By, (t) = Taken By, (c) = Cosigned By    Initials Name Provider Type    Mckayla Yu, PTA Physical Therapy Assistant                       PT OP Goals     Row Name 10/18/19 0900          PT Short Term Goals    STG Date to Achieve  09/27/19  -     STG 1  Decrease right " knee pain to 3-4/10 with activity.  -GC     STG 1 Progress  Met  -GC     STG 2  Increase right knee AROM to 0-5-110 degrees with testing.  -GC     STG 2 Progress  Partially Met  -GC     STG 3  Increase right LE strength to at least 4/5 all planes with testing.  -GC     STG 3 Progress  Met  -GC     STG 4  Pt will be independent with his HEP issued by this therapist.  -GC     STG 4 Progress  Met  -        Long Term Goals    LTG Date to Achieve  10/18/19  -GC     LTG 1  Decrease right knee pain to 0-1/10 with activity.  -GC     LTG 1 Progress  Partially Met  -GC     LTG 2  Increase right knee AROM to 0-125 degrees with testing.  -GC     LTG 2 Progress  Ongoing  -GC     LTG 3  Increase right LE strength to at least 4+/5 all planes with testing.  -GC     LTG 3 Progress  Met  -GC     LTG 4  Pt will ambulate normally on levels and stairs.  -GC     LTG 4 Progress  Ongoing  -GC     LTG 5  Pt will be independent with all ADLs and have a LEFS score > 65.  -     LTG 5 Progress  Ongoing  -GC       User Key  (r) = Recorded By, (t) = Taken By, (c) = Cosigned By    Initials Name Provider Type    Jose Hilliard PT Physical Therapist                         Time Calculation:   Start Time: 0900  Stop Time: 1025  Time Calculation (min): 85 min  Therapy Charges for Today     Code Description Service Date Service Provider Modifiers Qty    56671018005 HC PT MANUAL THERAPY EA 15 MIN 10/18/2019 Mckayla Perdomo, PTA GP 1    55783102742 HC PT THER PROC EA 15 MIN 10/18/2019 Mckayla Perdomo PTA GP 1                    Mckayla Perdomo PTA  10/18/2019

## 2019-10-18 NOTE — PROGRESS NOTES
CC: s/p right total knee arthroplasty, DOS 09/04/19    Interval History: Jefry Sabillon returns for 6 week postoperative visit.  He is doing well. He denies any residual pain at this point. He denies any wound problem, fevers, or chills. Patient is continuing to work with outpatient PT. Ambulating without cane.     Physical Examination: Right knee was examined   Incision well healed   ROM 0-85,  4+/5 strength   No extensor lag   Minimal effusion    Stable to varus and valgus stress   Flex/extend ankle and toes   Positive sensation right foot   No calf pain, negative Homans sign bilaterally    Radiographic review: Radiographs of the right  knee 3 views, AP, lateral and patella axial views, compared to immediate postop films, indication s/p TKA,  shows that the implant is in good position. There is no evidence of implant loosening or osteolysis, no dislocation or periprosthetic fractures. Overall alignment acceptable at this time.     Assessment/Plan:  Jefry Sabillon is recovering from surgery as expected though he does have some persistent stiffness particularly in flexion.  Due to this we will add Medrol Dosepak and continue with aggressive therapy to work on stretching, encouraged him to do home exercises for stretching multiple times per day.  I did discuss with him that we are at risk for needing a manipulation under anesthesia versus a Dynasplint if we do not see significant progress with his flexion.  We will continue therapy for range of motion, strengthening, and gait normalization. .  He is to follow up in clinic in 3 weeks with no xrays. Patient had all question answered today. Discussed need for prophylactic antibiotics with dental/endoscopy procedures.  Patient may not return to work at this time.       Medications:  New Medications Ordered This Visit   Medications   • methylPREDNISolone (MEDROL, ODALYS,) 4 MG tablet     Sig: Take as directed on package instructions.     Dispense:  1 each     Refill:  0     By  signing my name here, I Nathaly Driver, attest that all documentation on 10/18/19 at 5:21 PM has been prepared under the direction and in the presence of Dr. Lopez Mejias.    I, Dr. Lopez Mejias, personally performed the services described in this documentation, as scribed by Nathaly Driver, in my presence, and it is both accurate and complete.

## 2019-10-21 ENCOUNTER — HOSPITAL ENCOUNTER (OUTPATIENT)
Dept: PHYSICAL THERAPY | Facility: HOSPITAL | Age: 69
Setting detail: THERAPIES SERIES
Discharge: HOME | End: 2019-10-21

## 2019-10-21 DIAGNOSIS — Z96.651 STATUS POST TOTAL RIGHT KNEE REPLACEMENT: Primary | ICD-10-CM

## 2019-10-21 PROCEDURE — 97140 MANUAL THERAPY 1/> REGIONS: CPT

## 2019-10-21 PROCEDURE — 97110 THERAPEUTIC EXERCISES: CPT

## 2019-10-21 NOTE — THERAPY TREATMENT NOTE
Outpatient Physical Therapy Ortho Treatment Note   Linda Holm     Patient Name: Jefry Sabillon  : 1950  MRN: [id number]  Today's Date: 10/21/2019      Visit Date: 10/21/2019    Visit Dx:    ICD-10-CM ICD-9-CM   1. Status post total right knee replacement Z96.651 V43.65       Patient Active Problem List   Diagnosis   • Primary osteoarthritis of knees, bilateral   • Acute dyspnea   • Pericarditis   • Essential hypertension   • Mixed hyperlipidemia   • Primary osteoarthritis of right knee   • Status post total right knee replacement        Past Medical History:   Diagnosis Date   • Arthritis    • Asthma    • Cardiac disease    • Coronary artery disease    • Diabetes mellitus (CMS/HCC)     take Metformin   • Heart disease    • Hypertension    • Knee sprain    • Mini stroke (CMS/HCC)    • EVE (obstructive sleep apnea)    • Rotator cuff syndrome         Past Surgical History:   Procedure Laterality Date   • HERNIA REPAIR     • KNEE SURGERY     • TOTAL KNEE ARTHROPLASTY Right 2019    Procedure: TOTAL KNEE ARTHROPLASTY AND ALL ASSOCIATED PROCEDURES;  Surgeon: Lopez Mejias MD;  Location: [facility];  Service: Orthopedics                       PT Assessment/Plan     Row Name 10/21/19 1000          PT Assessment    Assessment Comments  Pt continues to do well with strengthening exercises but limited ROM primarily in flexion. Written HEP provided for pt to complete on vacation.  -KM        PT Plan    PT Plan Comments  Continue to push ROM  -KM       User Key  (r) = Recorded By, (t) = Taken By, (c) = Cosigned By    Initials Name Provider Type    Mckayla Yu PTA Physical Therapy Assistant            OP Exercises     Row Name 10/21/19 1000             Subjective Comments    Subjective Comments  Pt states his f/u appointment went well and to continue PT/HEP and push ROM. Pt states he had started the anti-inflammatory and feels his knee is looser and can bend a little more.   -KM         Exercise  "1    Exercise Name 1  patella mobilizations  -KM      Cueing 1  Tactile  -KM      Time 1  5 min  -KM         Exercise 2    Exercise Name 2  Heel slides  -KM      Cueing 2  Verbal;Tactile  -KM      Time 2  8 min  -KM         Exercise 3    Exercise Name 3  Wall Slides  -KM      Cueing 3  Verbal;Tactile  -KM      Time 3  8 min  -KM         Exercise 4    Exercise Name 4  Passive knee flexion stretch  -KM      Cueing 4  Verbal;Tactile  -KM      Reps 4  10  -KM      Time 4  10 secs  -KM         Exercise 5    Exercise Name 5  Hamstring/gastroc stretch  -KM      Cueing 5  Verbal;Tactile  -KM      Time 5  5 min  -KM         Exercise 6    Exercise Name 6  Supine knee extension on roll  -KM      Cueing 6  Verbal  -KM      Time 6  5 min  -KM         Exercise 7    Exercise Name 7  Passive knee extension stretches  -KM      Cueing 7  Verbal;Tactile  -KM      Reps 7  10   -KM      Time 7  10 sec  -KM         Exercise 8    Exercise Name 8  QS with Russian Stim  -KM      Cueing 8  Verbal;Tactile  -KM      Time 8  10 min 10/10  -KM         Exercise 9    Exercise Name 9  SLR  -KM      Cueing 9  Verbal;Tactile  -KM      Reps 9  25  -KM      Time 9  4#  -KM         Exercise 10    Exercise Name 10  Hip ABD  -KM      Cueing 10  Verbal;Tactile  -KM      Reps 10  25  -KM      Time 10  4#  -KM         Exercise 11    Exercise Name 11  SAQ  -KM      Cueing 11  Verbal;Tactile  -KM         Exercise 12    Exercise Name 12  LAQ  -KM      Cueing 12  Verbal;Tactile  -KM      Reps 12  25  -KM      Time 12  4#  -KM         Exercise 13    Exercise Name 13  Heel Raises  -KM      Cueing 13  Verbal;Demo  -KM      Reps 13  40  -KM         Exercise 14    Exercise Name 14  Partial squats  -KM      Cueing 14  Verbal;Demo  -KM      Reps 14  30  -KM         Exercise 15    Exercise Name 15  6\" Fwd Step Ups  -KM      Cueing 15  Verbal;Demo  -KM      Reps 15  45x  -KM         Exercise 16    Exercise Name 16  TKE vs TB  -KM      Reps 16  40  -KM      Time 16  Gold  " "-KM         Exercise 17    Exercise Name 17  Bike  -KM      Time 17  5 min  -KM      Additional Comments  seat 3   -KM         Exercise 18    Exercise Name 18  6\" Lat. Step Overs  -KM      Cueing 18  Verbal;Demo  -KM      Reps 18  25x  -KM         Exercise 19    Exercise Name 19  Knee Flexion stretch on step  -KM      Reps 19  10x  -KM         Exercise 20    Exercise Name 20  4\" Lateral Dip  -KM      Reps 20  15  -KM        User Key  (r) = Recorded By, (t) = Taken By, (c) = Cosigned By    Initials Name Provider Type    Mckayla Yu PTA Physical Therapy Assistant                                          Time Calculation:   Start Time: 1000  Stop Time: 1135  Time Calculation (min): 95 min  Therapy Charges for Today     Code Description Service Date Service Provider Modifiers Qty    89086990512 HC PT MANUAL THERAPY EA 15 MIN 10/21/2019 Mckayla Perdomo PTA GP 1    12021788455 HC PT THER PROC EA 15 MIN 10/21/2019 Mckayla Perdomo PTA GP 1                    Mckayla Perdomo PTA  10/21/2019     "

## 2019-10-23 ENCOUNTER — HOSPITAL ENCOUNTER (OUTPATIENT)
Dept: PHYSICAL THERAPY | Facility: HOSPITAL | Age: 69
Setting detail: THERAPIES SERIES
Discharge: HOME | End: 2019-10-23

## 2019-10-23 DIAGNOSIS — Z96.651 STATUS POST TOTAL RIGHT KNEE REPLACEMENT: Primary | ICD-10-CM

## 2019-10-23 PROCEDURE — 97110 THERAPEUTIC EXERCISES: CPT | Performed by: PHYSICAL THERAPIST

## 2019-10-23 NOTE — THERAPY TREATMENT NOTE
Outpatient Physical Therapy Ortho Treatment Note   Linda Holm     Patient Name: Jefry Sabillon  : 1950  MRN: [id number]  Today's Date: 10/23/2019      Visit Date: 10/23/2019    Visit Dx:    ICD-10-CM ICD-9-CM   1. Status post total right knee replacement Z96.651 V43.65       Patient Active Problem List   Diagnosis   • Primary osteoarthritis of knees, bilateral   • Acute dyspnea   • Pericarditis   • Essential hypertension   • Mixed hyperlipidemia   • Primary osteoarthritis of right knee   • Status post total right knee replacement        Past Medical History:   Diagnosis Date   • Arthritis    • Asthma    • Cardiac disease    • Coronary artery disease    • Diabetes mellitus (CMS/HCC)     take Metformin   • Heart disease    • Hypertension    • Knee sprain    • Mini stroke (CMS/HCC)    • EVE (obstructive sleep apnea)    • Rotator cuff syndrome         Past Surgical History:   Procedure Laterality Date   • HERNIA REPAIR     • KNEE SURGERY     • TOTAL KNEE ARTHROPLASTY Right 2019    Procedure: TOTAL KNEE ARTHROPLASTY AND ALL ASSOCIATED PROCEDURES;  Surgeon: Lopez Mejias MD;  Location: [facility];  Service: Orthopedics       PT Ortho     Row Name 10/23/19 1000       Subjective Comments    Subjective Comments  Pt states his knee is feeling pretty good.  -      User Key  (r) = Recorded By, (t) = Taken By, (c) = Cosigned By    Initials Name Provider Type    Jose Hilliard, PT Physical Therapist                      PT Assessment/Plan     Row Name 10/23/19 1000          PT Assessment    Assessment Comments  Pt continues to show decresaed knee flexion range.  -        PT Plan    PT Plan Comments  Pt is going out of town next 10-14 days. Will re-ck again when he returns back in town.  -       User Key  (r) = Recorded By, (t) = Taken By, (c) = Cosigned By    Initials Name Provider Type    Jose Hilliard, PT Physical Therapist            OP Exercises     Row Name 10/23/19 1000              "Subjective Comments    Subjective Comments  Pt states his knee is feeling pretty good.  -GC         Exercise 1    Exercise Name 1  patella mobilizations  -GC      Cueing 1  Tactile  -GC      Time 1  5 min  -GC         Exercise 2    Exercise Name 2  Heel slides  -GC      Cueing 2  Verbal;Tactile  -GC      Time 2  8 min  -GC         Exercise 3    Exercise Name 3  Wall Slides  -GC      Cueing 3  Verbal;Tactile  -GC      Time 3  8 min  -GC         Exercise 4    Exercise Name 4  Passive knee flexion stretch  -GC      Cueing 4  Verbal;Tactile  -GC      Reps 4  10  -GC      Time 4  10 secs  -GC         Exercise 5    Exercise Name 5  Hamstring/gastroc stretch  -GC      Cueing 5  Verbal;Tactile  -GC      Time 5  5 min  -GC         Exercise 6    Exercise Name 6  Supine knee extension on roll  -GC      Cueing 6  Verbal  -GC      Time 6  5 min  -GC         Exercise 7    Exercise Name 7  Passive knee extension stretches  -GC      Cueing 7  Verbal;Tactile  -GC      Reps 7  10   -GC      Time 7  10 sec  -GC         Exercise 8    Exercise Name 8  QS with Russian Stim  -GC      Cueing 8  Verbal;Tactile  -GC      Time 8  10 min 10/10  -GC         Exercise 9    Exercise Name 9  SLR  -GC      Cueing 9  Verbal;Tactile  -GC      Reps 9  25  -GC      Time 9  4#  -GC         Exercise 10    Exercise Name 10  Hip ABD  -GC      Cueing 10  Verbal;Tactile  -GC      Reps 10  25  -GC      Time 10  4#  -GC         Exercise 11    Exercise Name 11  SAQ  -GC      Cueing 11  Verbal;Tactile  -GC         Exercise 12    Exercise Name 12  LAQ  -GC      Cueing 12  Verbal;Tactile  -GC      Reps 12  25  -GC      Time 12  4#  -GC         Exercise 13    Exercise Name 13  Heel Raises  -GC      Cueing 13  Verbal;Demo  -GC      Reps 13  40  -GC         Exercise 14    Exercise Name 14  Partial squats  -GC      Cueing 14  Verbal;Demo  -GC      Reps 14  30  -GC         Exercise 15    Exercise Name 15  6\" Fwd Step Ups  -GC      Cueing 15  Verbal;Demo  -GC      Reps " "15  45x  -GC         Exercise 16    Exercise Name 16  TKE vs TB  -GC      Reps 16  40  -GC      Time 16  Gold  -GC         Exercise 17    Exercise Name 17  Bike  -GC      Time 17  5 min  -GC         Exercise 18    Exercise Name 18  6\" Lat. Step Overs  -GC      Cueing 18  Verbal;Demo  -GC      Reps 18  25x  -GC         Exercise 19    Exercise Name 19  Knee Flexion stretch on step  -GC      Reps 19  10x  -GC         Exercise 20    Exercise Name 20  4\" Lateral Dip  -GC      Reps 20  15  -GC        User Key  (r) = Recorded By, (t) = Taken By, (c) = Cosigned By    Initials Name Provider Type     Jose Adam, PT Physical Therapist                                          Time Calculation:   Start Time: 1000  Stop Time: 1123  Time Calculation (min): 83 min  Therapy Charges for Today     Code Description Service Date Service Provider Modifiers Qty    29559074318  PT THER PROC EA 15 MIN 10/23/2019 Jose Adam, PT GP 2                    Jose Adam, PT  10/23/2019     "

## 2019-11-06 ENCOUNTER — HOSPITAL ENCOUNTER (OUTPATIENT)
Dept: PHYSICAL THERAPY | Facility: HOSPITAL | Age: 69
Setting detail: THERAPIES SERIES
Discharge: HOME | End: 2019-11-06

## 2019-11-06 DIAGNOSIS — Z96.651 STATUS POST TOTAL RIGHT KNEE REPLACEMENT: Primary | ICD-10-CM

## 2019-11-06 PROCEDURE — 97140 MANUAL THERAPY 1/> REGIONS: CPT

## 2019-11-06 PROCEDURE — 97110 THERAPEUTIC EXERCISES: CPT

## 2019-11-06 NOTE — THERAPY TREATMENT NOTE
Outpatient Physical Therapy Ortho Treatment Note  FLORI Craven     Patient Name: Jefry Sabillon  : 1950  MRN: [id number]  Today's Date: 2019      Visit Date: 2019    Visit Dx:    ICD-10-CM ICD-9-CM   1. Status post total right knee replacement Z96.651 V43.65       Patient Active Problem List   Diagnosis   • Primary osteoarthritis of knees, bilateral   • Acute dyspnea   • Pericarditis   • Essential hypertension   • Mixed hyperlipidemia   • Primary osteoarthritis of right knee   • Status post total right knee replacement        Past Medical History:   Diagnosis Date   • Arthritis    • Asthma    • Cardiac disease    • Coronary artery disease    • Diabetes mellitus (CMS/[facility])     take Metformin   • Heart disease    • Hypertension    • Knee sprain    • Mini stroke (CMS/[facility])    • EVE (obstructive sleep apnea)    • Rotator cuff syndrome         Past Surgical History:   Procedure Laterality Date   • HERNIA REPAIR     • KNEE SURGERY     • TOTAL KNEE ARTHROPLASTY Right 2019    Procedure: TOTAL KNEE ARTHROPLASTY AND ALL ASSOCIATED PROCEDURES;  Surgeon: Lopez Mejias MD;  Location: [facility];  Service: Orthopedics       PT Ortho     Row Name 19 5910       Right Lower Ext    Rt Knee Extension/Flexion AROM  0-90 post flexion  -KM      User Key  (r) = Recorded By, (t) = Taken By, (c) = Cosigned By    Initials Name Provider Type    Mckayla Yu PTA Physical Therapy Assistant                      PT Assessment/Plan     Row Name 19 9824          PT Assessment    Assessment Comments  Pt continues to progress well with strength and overall function but continues to have significant limitation in flexion ROM                                                                                                                                                                                                                                                                                                                                                                                                                                                                                                                                                                                              .  -KM        PT Plan    PT Plan Comments  Continue POC per MD. Continue to push ROM  -KM       User Key  (r) = Recorded By, (t) = Taken By, (c) = Cosigned By    Initials Name Provider Type    Mckayla Yu PTA Physical Therapy Assistant            OP Exercises     Row Name 11/06/19 0975             Subjective Comments    Subjective Comments  Pt states he completed increased ambulation including over uneven surfaces and stairs while on vacation and states his knee felt pretty good.   -KM         Exercise 1    Exercise Name 1  patella mobilizations  -KM      Cueing 1  Tactile  -KM      Time 1  5 min  -KM         Exercise 2    Exercise Name 2  Heel slides  -KM      Cueing 2  Verbal;Tactile  -KM      Time 2  8 min  -KM         Exercise 3    Exercise Name 3  Wall Slides  -KM      Cueing 3  Verbal;Tactile  -KM      Time 3  8 min  -KM         Exercise 4    Exercise Name 4  Passive knee flexion stretch  -KM      Cueing 4  Verbal;Tactile  -KM      Reps 4  10  -KM      Time 4  10 secs  -KM         Exercise 5    Exercise Name 5  Hamstring/gastroc stretch  -KM      Cueing 5  Verbal;Tactile  -KM      Time 5  5 min  -KM         Exercise 6    Exercise Name 6  Supine knee extension on roll  -KM      Cueing 6  Verbal  -KM      Time 6  5 min  -KM      Additional Comments  4#  -KM         Exercise 7    Exercise Name 7  Passive knee extension stretches  -KM      Cueing 7  Verbal;Tactile  -KM      Reps 7  10   -KM      Time 7  10 sec  -KM         Exercise 8    Exercise Name 8  QS with Russian Stim  -KM      Cueing 8  --  -KM      Time 8  --  -KM         Exercise 9    Exercise Name 9  SLR  -KM      Cueing 9  Verbal;Tactile  -KM      Reps 9  25   "-KM      Time 9  4#  -KM         Exercise 10    Exercise Name 10  Hip ABD  -KM      Cueing 10  Verbal;Tactile  -KM      Reps 10  25  -KM      Time 10  4#  -KM         Exercise 11    Exercise Name 11  SAQ  -KM      Cueing 11  Verbal;Tactile  -KM         Exercise 12    Exercise Name 12  LAQ  -KM      Cueing 12  Verbal;Tactile  -KM      Reps 12  25  -KM      Time 12  4#  -KM         Exercise 13    Exercise Name 13  Heel Raises  -KM      Cueing 13  Verbal;Demo  -KM      Reps 13  40  -KM         Exercise 14    Exercise Name 14  Partial squats  -KM      Cueing 14  Verbal;Demo  -KM      Reps 14  30  -KM         Exercise 15    Exercise Name 15  6\" Fwd Step Ups  -KM      Cueing 15  Verbal;Demo  -KM      Reps 15  50x  -KM         Exercise 16    Exercise Name 16  TKE vs TB  -KM      Reps 16  40  -KM      Time 16  Gold  -KM         Exercise 17    Exercise Name 17  Bike  -KM      Time 17  5 min  -KM      Additional Comments  seat 3  -KM         Exercise 18    Exercise Name 18  6\" Lat. Step Overs  -KM      Cueing 18  Verbal;Demo  -KM      Reps 18  50x  -KM         Exercise 19    Exercise Name 19  Knee Flexion stretch on step  -KM      Reps 19  10x  -KM         Exercise 20    Exercise Name 20  4\" Lateral Dip  -KM      Reps 20  20  -KM        User Key  (r) = Recorded By, (t) = Taken By, (c) = Cosigned By    Initials Name Provider Type    Mckayla Yu PTA Physical Therapy Assistant                                          Time Calculation:   Start Time: 0950  Stop Time: 1105  Time Calculation (min): 75 min  Therapy Charges for Today     Code Description Service Date Service Provider Modifiers Qty    35547448078 HC PT MANUAL THERAPY EA 15 MIN 11/6/2019 Mckayla Perdomo PTA GP 1    59138245665 HC PT THER PROC EA 15 MIN 11/6/2019 Mckayla Perdomo PTA GP 1                    Mckayla Perdomo PTA  11/6/2019     "

## 2019-11-07 ENCOUNTER — OFFICE VISIT (OUTPATIENT)
Dept: ORTHOPEDIC SURGERY | Facility: CLINIC | Age: 69
End: 2019-11-07

## 2019-11-07 VITALS — BODY MASS INDEX: 38.19 KG/M2 | HEIGHT: 72 IN | WEIGHT: 282 LBS

## 2019-11-07 DIAGNOSIS — Z96.651 STATUS POST TOTAL RIGHT KNEE REPLACEMENT: Primary | ICD-10-CM

## 2019-11-07 PROCEDURE — 99024 POSTOP FOLLOW-UP VISIT: CPT | Performed by: ORTHOPAEDIC SURGERY

## 2019-11-07 RX ORDER — FUROSEMIDE 20 MG/1
20 TABLET ORAL 2 TIMES DAILY
Status: ON HOLD | COMMUNITY
End: 2019-11-11

## 2019-11-07 NOTE — PROGRESS NOTES
CC: s/p right total knee arthroplasty, DOS 09/04/19    Interval History: Jefry Sabillon returns for 8 week postoperative visit. He is very happy with the fact that he is not experiencing any pain in his right knee. He has been working with physical therapy. He states in therapy his range of motion is 0-92. He does note significant stiffness in his right knee.       Physical Examination: Right knee was examined   Midline Incision well healed   No erythema or fluctuance   ROM 0-85,  4+/5 strength   Stable to varus and valgus stress   Flex/extend ankle and toes   Positive sensation right foot   No calf pain, negative Homans sign bilaterally    Assessment/Plan:  Jefry Sabillon is recovering from surgery but his range of motion is limited.  He is very happy with the fact that he has little to no pain in his knee at this point time but is still noticing limitations on improving his motion.  He did have some limitations with flexion preoperatively and he certainly has limitations on his left knee with flexion only to about 95 degrees on that side but I would like to try to improve his flexion as much as possible at this point time.  Due to his stiffness and limited improvement with physical therapy, we will proceed with manipulation under anesthesia. We also discussed a dynasplint but we will hold-off on this for now.   I explained to the patient details of procedure as well as risks, benefits, and alternatives with the risks including not limited to neurovascular damage, bleeding, infection, chronic pain, recurrence of loss of motion, patellar/quad tendon rupture, periprosthetic fracture, incisional dehiscence, weakness, stiffness, instability, DVT, pulmonary embolus, death, stroke, complex regional pain syndrome, myocardial infarction, need for additional procedures.  Patient understood all these had all questions answered prior to consenting to proceed with surgery. No guarantees were given in regards to results of  surgery.  We will have patient continue daily physical therapy for 2 weeks after manipulation to try to maintain improvements from manipulation.    Following his manipulation he will require physical therapy daily for 2 weeks.     Medications:  No orders of the defined types were placed in this encounter.    By signing my name here, I Nathaly Driver, attest that all documentation on 11/07/19 at 7:53 AM has been prepared under the direction and in the presence of Dr. Lopez Mejias.    I, Dr. Lopez Mejias, personally performed the services described in this documentation, as scribed by Nathaly Driver, in my presence, and it is both accurate and complete.

## 2019-11-08 ENCOUNTER — APPOINTMENT (OUTPATIENT)
Dept: PHYSICAL THERAPY | Facility: HOSPITAL | Age: 69
End: 2019-11-08

## 2019-11-08 ENCOUNTER — ANESTHESIA EVENT (OUTPATIENT)
Dept: PERIOP | Facility: HOSPITAL | Age: 69
End: 2019-11-08

## 2019-11-08 RX ORDER — PREGABALIN 150 MG/1
150 CAPSULE ORAL ONCE
Status: CANCELLED | OUTPATIENT
Start: 2019-11-08 | End: 2019-11-08

## 2019-11-08 RX ORDER — MELOXICAM 7.5 MG/1
15 TABLET ORAL ONCE
Status: CANCELLED | OUTPATIENT
Start: 2019-11-08 | End: 2019-11-08

## 2019-11-08 RX ORDER — ACETAMINOPHEN 325 MG/1
1000 TABLET ORAL ONCE
Status: CANCELLED | OUTPATIENT
Start: 2019-11-08 | End: 2019-11-08

## 2019-11-11 ENCOUNTER — APPOINTMENT (OUTPATIENT)
Dept: PHYSICAL THERAPY | Facility: HOSPITAL | Age: 69
End: 2019-11-11

## 2019-11-11 ENCOUNTER — ANESTHESIA (OUTPATIENT)
Dept: PERIOP | Facility: HOSPITAL | Age: 69
End: 2019-11-11

## 2019-11-11 ENCOUNTER — HOSPITAL ENCOUNTER (OUTPATIENT)
Facility: HOSPITAL | Age: 69
Setting detail: HOSPITAL OUTPATIENT SURGERY
Discharge: HOME | End: 2019-11-11
Attending: ORTHOPAEDIC SURGERY | Admitting: ORTHOPAEDIC SURGERY

## 2019-11-11 VITALS
HEART RATE: 70 BPM | BODY MASS INDEX: 37.55 KG/M2 | SYSTOLIC BLOOD PRESSURE: 133 MMHG | DIASTOLIC BLOOD PRESSURE: 71 MMHG | RESPIRATION RATE: 15 BRPM | OXYGEN SATURATION: 97 % | HEIGHT: 72 IN | WEIGHT: 277.2 LBS | TEMPERATURE: 98 F

## 2019-11-11 DIAGNOSIS — Z96.651 STATUS POST TOTAL RIGHT KNEE REPLACEMENT: ICD-10-CM

## 2019-11-11 LAB
GLUCOSE BLDC GLUCOMTR-MCNC: 98 MG/DL (ref 70–130)
POTASSIUM BLD-SCNC: 4.3 MMOL/L (ref 3.5–5.2)

## 2019-11-11 PROCEDURE — 25010000002 PROPOFOL 10 MG/ML EMULSION: Performed by: NURSE ANESTHETIST, CERTIFIED REGISTERED

## 2019-11-11 PROCEDURE — 25010000002 ROPIVACAINE PER 1 MG: Performed by: NURSE ANESTHETIST, CERTIFIED REGISTERED

## 2019-11-11 PROCEDURE — 82962 GLUCOSE BLOOD TEST: CPT

## 2019-11-11 PROCEDURE — 84132 ASSAY OF SERUM POTASSIUM: CPT | Performed by: ANESTHESIOLOGY

## 2019-11-11 PROCEDURE — 25010000002 MIDAZOLAM PER 1 MG: Performed by: ANESTHESIOLOGY

## 2019-11-11 PROCEDURE — 25010000002 MIDAZOLAM PER 1MG: Performed by: ANESTHESIOLOGY

## 2019-11-11 PROCEDURE — 27570 FIXATION OF KNEE JOINT: CPT | Performed by: ORTHOPAEDIC SURGERY

## 2019-11-11 PROCEDURE — 25010000003 LIDOCAINE 1 % SOLUTION 2 ML VIAL: Performed by: ORTHOPAEDIC SURGERY

## 2019-11-11 PROCEDURE — 25010000002 TRIAMCINOLONE PER 10 MG: Performed by: ORTHOPAEDIC SURGERY

## 2019-11-11 RX ORDER — ONDANSETRON 2 MG/ML
4 INJECTION INTRAMUSCULAR; INTRAVENOUS ONCE AS NEEDED
Status: DISCONTINUED | OUTPATIENT
Start: 2019-11-11 | End: 2019-11-11 | Stop reason: HOSPADM

## 2019-11-11 RX ORDER — LIDOCAINE HYDROCHLORIDE 20 MG/ML
INJECTION, SOLUTION INFILTRATION; PERINEURAL AS NEEDED
Status: DISCONTINUED | OUTPATIENT
Start: 2019-11-11 | End: 2019-11-11 | Stop reason: SURG

## 2019-11-11 RX ORDER — PROPOFOL 10 MG/ML
VIAL (ML) INTRAVENOUS AS NEEDED
Status: DISCONTINUED | OUTPATIENT
Start: 2019-11-11 | End: 2019-11-11 | Stop reason: SURG

## 2019-11-11 RX ORDER — MIDAZOLAM HYDROCHLORIDE 1 MG/ML
1 INJECTION INTRAMUSCULAR; INTRAVENOUS
Status: DISCONTINUED | OUTPATIENT
Start: 2019-11-11 | End: 2019-11-11 | Stop reason: HOSPADM

## 2019-11-11 RX ORDER — ACETAMINOPHEN 500 MG
1000 TABLET ORAL ONCE
Status: COMPLETED | OUTPATIENT
Start: 2019-11-11 | End: 2019-11-11

## 2019-11-11 RX ORDER — SODIUM CHLORIDE 9 MG/ML
40 INJECTION, SOLUTION INTRAVENOUS AS NEEDED
Status: DISCONTINUED | OUTPATIENT
Start: 2019-11-11 | End: 2019-11-11 | Stop reason: HOSPADM

## 2019-11-11 RX ORDER — SODIUM CHLORIDE, SODIUM LACTATE, POTASSIUM CHLORIDE, CALCIUM CHLORIDE 600; 310; 30; 20 MG/100ML; MG/100ML; MG/100ML; MG/100ML
9 INJECTION, SOLUTION INTRAVENOUS CONTINUOUS
Status: DISCONTINUED | OUTPATIENT
Start: 2019-11-11 | End: 2019-11-11 | Stop reason: HOSPADM

## 2019-11-11 RX ORDER — LIDOCAINE HYDROCHLORIDE 10 MG/ML
0.5 INJECTION, SOLUTION EPIDURAL; INFILTRATION; INTRACAUDAL; PERINEURAL ONCE AS NEEDED
Status: DISCONTINUED | OUTPATIENT
Start: 2019-11-11 | End: 2019-11-11 | Stop reason: HOSPADM

## 2019-11-11 RX ORDER — MIDAZOLAM HYDROCHLORIDE 1 MG/ML
2 INJECTION INTRAMUSCULAR; INTRAVENOUS
Status: DISCONTINUED | OUTPATIENT
Start: 2019-11-11 | End: 2019-11-11 | Stop reason: HOSPADM

## 2019-11-11 RX ORDER — PREGABALIN 75 MG/1
150 CAPSULE ORAL ONCE
Status: COMPLETED | OUTPATIENT
Start: 2019-11-11 | End: 2019-11-11

## 2019-11-11 RX ORDER — SODIUM CHLORIDE, SODIUM LACTATE, POTASSIUM CHLORIDE, CALCIUM CHLORIDE 600; 310; 30; 20 MG/100ML; MG/100ML; MG/100ML; MG/100ML
100 INJECTION, SOLUTION INTRAVENOUS CONTINUOUS
Status: DISCONTINUED | OUTPATIENT
Start: 2019-11-11 | End: 2019-11-11 | Stop reason: HOSPADM

## 2019-11-11 RX ORDER — MEPERIDINE HYDROCHLORIDE 25 MG/ML
12.5 INJECTION INTRAMUSCULAR; INTRAVENOUS; SUBCUTANEOUS
Status: DISCONTINUED | OUTPATIENT
Start: 2019-11-11 | End: 2019-11-11 | Stop reason: HOSPADM

## 2019-11-11 RX ORDER — SODIUM CHLORIDE 0.9 % (FLUSH) 0.9 %
3 SYRINGE (ML) INJECTION EVERY 12 HOURS SCHEDULED
Status: DISCONTINUED | OUTPATIENT
Start: 2019-11-11 | End: 2019-11-11 | Stop reason: HOSPADM

## 2019-11-11 RX ORDER — ROPIVACAINE HYDROCHLORIDE 2 MG/ML
INJECTION, SOLUTION EPIDURAL; INFILTRATION; PERINEURAL
Status: DISCONTINUED | OUTPATIENT
Start: 2019-11-11 | End: 2019-11-11 | Stop reason: SURG

## 2019-11-11 RX ORDER — HYDROCODONE BITARTRATE AND ACETAMINOPHEN 7.5; 325 MG/1; MG/1
1-2 TABLET ORAL EVERY 4 HOURS PRN
Qty: 40 TABLET | Refills: 0 | Status: SHIPPED | OUTPATIENT
Start: 2019-11-11 | End: 2020-01-02

## 2019-11-11 RX ORDER — MELOXICAM 7.5 MG/1
15 TABLET ORAL ONCE
Status: COMPLETED | OUTPATIENT
Start: 2019-11-11 | End: 2019-11-11

## 2019-11-11 RX ORDER — SODIUM CHLORIDE 0.9 % (FLUSH) 0.9 %
1-10 SYRINGE (ML) INJECTION AS NEEDED
Status: DISCONTINUED | OUTPATIENT
Start: 2019-11-11 | End: 2019-11-11 | Stop reason: HOSPADM

## 2019-11-11 RX ADMIN — ROPIVACAINE HYDROCHLORIDE 20 ML: 2 INJECTION, SOLUTION EPIDURAL; INFILTRATION at 13:50

## 2019-11-11 RX ADMIN — PREGABALIN 150 MG: 75 CAPSULE ORAL at 12:37

## 2019-11-11 RX ADMIN — MELOXICAM 15 MG: 7.5 TABLET ORAL at 12:37

## 2019-11-11 RX ADMIN — PROPOFOL 100 MG: 10 INJECTION, EMULSION INTRAVENOUS at 14:06

## 2019-11-11 RX ADMIN — MIDAZOLAM HYDROCHLORIDE 2 MG: 1 INJECTION, SOLUTION INTRAMUSCULAR; INTRAVENOUS at 13:51

## 2019-11-11 RX ADMIN — ACETAMINOPHEN 1000 MG: 500 TABLET, FILM COATED ORAL at 12:37

## 2019-11-11 RX ADMIN — PROPOFOL 50 MG: 10 INJECTION, EMULSION INTRAVENOUS at 14:08

## 2019-11-11 RX ADMIN — LIDOCAINE HYDROCHLORIDE 100 MG: 20 INJECTION, SOLUTION INFILTRATION; PERINEURAL at 14:06

## 2019-11-11 RX ADMIN — SODIUM CHLORIDE, POTASSIUM CHLORIDE, SODIUM LACTATE AND CALCIUM CHLORIDE: 600; 310; 30; 20 INJECTION, SOLUTION INTRAVENOUS at 14:02

## 2019-11-11 NOTE — ANESTHESIA PREPROCEDURE EVALUATION
Anesthesia Evaluation     Patient summary reviewed and Nursing notes reviewed   NPO Solid Status: > 8 hours  NPO Liquid Status: > 4 hours           Airway   Mallampati: II  TM distance: >3 FB  Neck ROM: full  No difficulty expected  Dental - normal exam     Pulmonary - normal exam    breath sounds clear to auscultation  (+) asthma (no inhalers, no flare ups),sleep apnea on CPAP,   Cardiovascular - normal exam  Exercise tolerance: good (4-7 METS)    Patient on routine beta blocker and Beta blocker given within 24 hours of surgery  Rhythm: regular  Rate: normal    (+) hypertension well controlled, CAD, hyperlipidemia,       Neuro/Psych  (+) TIA (1995, no sequelae),     GI/Hepatic/Renal/Endo    (+)  GERD,    Diabetes: denies diabetes, takes metformin.    Musculoskeletal     Abdominal   (+) obese,    Substance History - negative use     OB/GYN negative ob/gyn ROS         Other   arthritis (generalized),      ROS/Med Hx Other: Water 0930 this am                  Anesthesia Plan    ASA 3     regional       Anesthetic plan, all risks, benefits, and alternatives have been provided, discussed and informed consent has been obtained with: patient and spouse/significant other.

## 2019-11-11 NOTE — H&P
History & Physical       Patient: Jefry Sabillon    YOB: 1950    Medical Record Number: [id number]    Surgeon:  Dr. Lopez Mejias    Chief Complaints: Right knee stiffness, status post total knee arthroplasty 9/4/2019    Subjective:  This problem is not new to this examiner.     History of Present Illness: 69 y.o. male presents with right knee stiffness status total knee arthroplasty.  Stiffness and pain have been progressively worsening despite more conservative treatment measures including aggressive physical therapy.  Symptoms are associated with ability to move, exercise, and perform activities of daily living.  Symptoms are aggravated by ROM necessary for activities of daily living.   Symptoms improve with stretching and physical therapy as well as oral anti-inflammatories and Medrol Dosepak only minimally.      Allergies:   Allergies   Allergen Reactions   • Penicillins Dermatitis       Medications:   Home Medications:  No current facility-administered medications on file prior to encounter.      Current Outpatient Medications on File Prior to Encounter   Medication Sig   • allopurinol (ZYLOPRIM) 300 MG tablet 300 mg Daily.   • apixaban (ELIQUIS) 2.5 MG tablet tablet Take 1 tablet by mouth Every 12 (Twelve) Hours.   • cetirizine (zyrTEC) 10 MG tablet Take 10 mg by mouth Daily.   • docusate sodium (COLACE) 100 MG capsule Take 1 capsule by mouth 2 (Two) Times a Day As Needed for Constipation.   • doxazosin (CARDURA) 4 MG tablet Take 1 tablet by mouth Every Night. (Patient taking differently: Take 4 mg by mouth 2 (Two) Times a Day.)   • elastomeric 8 mL/hr reservoir 1 each device 1 Device, sodium chloride 0.9 % solution 300 mL with bupivacaine (PF) 0.5 % solution 100 mL Inject 8 mL/hr into the appropriate area of the skin as directed by provider Continuous.   • elastomeric 8 mL/hr reservoir 1 each device 1 Device, sodium chloride 0.9 % solution 300 mL with bupivacaine (PF) 0.5 % solution 100 mL  Inject 8 mL/hr into the appropriate area of the skin as directed by provider Continuous.   • fenofibrate (TRICOR) 145 MG tablet Take  by mouth Daily.   • furosemide (LASIX) 20 MG tablet Take 20 mg by mouth As Needed.   • furosemide (LASIX) 20 MG tablet Take 20 mg by mouth 2 (Two) Times a Day.   • HYDROcodone-acetaminophen (NORCO) 5-325 MG per tablet 1-2 tablets every 4-6 hours as needed moderate-severe pain   • Magnesium 500 MG tablet Take 500 mg by mouth every night at bedtime.   • melatonin 5 MG tablet tablet Take 1 tablet by mouth Every Night.   • meloxicam (MOBIC) 15 MG tablet Take 1 tablet by mouth Daily.   • metFORMIN XR (GLUCOPHAGE-XR) 750 MG 24 hr tablet Take 750 mg by mouth Daily With Breakfast.   • methylPREDNISolone (MEDROL, ODALYS,) 4 MG tablet Take as directed on package instructions.   • metoprolol succinate XL (TOPROL-XL) 25 MG 24 hr tablet Take 1 tablet by mouth Daily. (Patient taking differently: Take 25 mg by mouth Daily.)   • ondansetron (ZOFRAN) 4 MG tablet Take 1 tablet by mouth Every 8 (Eight) Hours As Needed for Nausea.   • oxyCODONE-acetaminophen (PERCOCET) 5-325 MG per tablet 1-2 tabs PO q 4-6 hours, prn moderate-severe pain   • pantoprazole (PROTONIX) 40 MG EC tablet Take 40 mg by mouth Daily.   • valsartan-hydrochlorothiazide (DIOVAN-HCT) 320-25 MG per tablet Take 1 tablet by mouth.     Current Medications:  Scheduled Meds:  Continuous Infusions:  No current facility-administered medications for this encounter.   PRN Meds:.    I have reviewed the patient's medical history in detail and updated the computerized patient record.  Review and summarization of old records include:    Past Medical History:   Diagnosis Date   • Arthritis    • Asthma    • Cardiac disease    • Coronary artery disease    • Diabetes mellitus (CMS/HCC)     take Metformin   • Heart disease    • Hypertension    • Knee sprain    • Mini stroke (CMS/HCC)    • EVE (obstructive sleep apnea)    • Rotator cuff syndrome          Past Surgical History:   Procedure Laterality Date   • HERNIA REPAIR  2007   • KNEE SURGERY  1970   • TOTAL KNEE ARTHROPLASTY Right 9/4/2019    Procedure: TOTAL KNEE ARTHROPLASTY AND ALL ASSOCIATED PROCEDURES;  Surgeon: Lopez Mejias MD;  Location: [facility];  Service: Orthopedics        Social History     Occupational History   • Not on file   Tobacco Use   • Smoking status: Never Smoker   • Smokeless tobacco: Never Used   • Tobacco comment: caffeine use   Substance and Sexual Activity   • Alcohol use: Yes     Comment: occasional. monthly   • Drug use: No   • Sexual activity: Defer    Social History     Social History Narrative   • Not on file        Family History   Problem Relation Age of Onset   • Hypertension Father    • Heart disease Father    • Stroke Father        ROS: 14 point review of systems was performed and was negative except for documented findings in HPI and today's encounter.     Allergies:   Allergies   Allergen Reactions   • Penicillins Dermatitis     Constitutional:  Denies fever, shaking or chills   Eyes:  Denies change in visual acuity   HENT:  Denies nasal congestion or sore throat   Respiratory:  Denies cough or shortness of breath   Cardiovascular:  Denies chest pain or severe LE edema   GI:  Denies abdominal pain, nausea, vomiting, bloody stools or diarrhea   Musculoskeletal:  Denies numbness tingling or loss of motor function except as outlined above in history of present illness.  : Denies painful urination or hematuria  Integument:  Denies rash, lesion or ulceration   Neurologic:  Denies headache or focal weakness  Endocrine:  Denies lymphadenopathy  Psych:  Denies confusion or change in mental status   Hem:  Denies active bleeding    Physical Exam: 69 y.o. male  There is no height or weight on file to calculate BMI.  There were no vitals filed for this visit.   Vitals:    11/11/19 1200   BP: 144/71   BP Location: Right arm   Patient Position: Lying   Pulse: 76   Resp: 13  "  Temp: 98 °F (36.7 °C)   TempSrc: Oral   Weight: 126 kg (277 lb 3.2 oz)   Height: 182.9 cm (72\")       Vital signs reviewed.   General Appearance:    Alert, cooperative, in no acute distress                  Eyes: conjunctiva clear  ENT: external ears and nose atraumatic  CV: no peripheral edema  Resp: normal respiratory effort  Skin: no rashes or wounds; normal turgor  Psych: mood and affect appropriate  Lymph: no nodes appreciated  Neuro: gross sensation intact  Vascular:  Palpable peripheral pulse in noted extremity  Musculoskeletal Extremities: Right knee was examined              Midline Incision well healed              No erythema or fluctuance              ROM 0-85,  4+/5 strength              Stable to varus and valgus stress              Flex/extend ankle and toes              Positive sensation right foot              No calf pain, negative Homans sign bilaterally    Debilities/Disabilities Identified: None      Diagnostic Tests:  No visits with results within 2 Week(s) from this visit.   Latest known visit with results is:   Admission on 09/04/2019, Discharged on 09/05/2019   Component Date Value Ref Range Status   • Potassium 09/04/2019 4.2  3.5 - 5.2 mmol/L Final   • ABO Type 09/04/2019 O   Final   • RH type 09/04/2019 Positive   Final   • Antibody Screen 09/04/2019 Negative   Final   • T&S Expiration Date 09/04/2019 9/7/2019 11:59:59 PM   Final   • ABO Type 09/04/2019 O   Final   • RH type 09/04/2019 Positive   Final   • Glucose 09/04/2019 91  70 - 130 mg/dL Final   • Glucose 09/05/2019 146* 65 - 99 mg/dL Final   • BUN 09/05/2019 19  8 - 23 mg/dL Final   • Creatinine 09/05/2019 1.22  0.76 - 1.27 mg/dL Final   • Sodium 09/05/2019 139  136 - 145 mmol/L Final   • Potassium 09/05/2019 4.2  3.5 - 5.2 mmol/L Final   • Chloride 09/05/2019 101  98 - 107 mmol/L Final   • CO2 09/05/2019 23.9  22.0 - 29.0 mmol/L Final   • Calcium 09/05/2019 9.1  8.6 - 10.5 mg/dL Final   • eGFR Non African Amer 09/05/2019 59* " >60 mL/min/1.73 Final   • BUN/Creatinine Ratio 09/05/2019 15.6  7.0 - 25.0 Final   • Anion Gap 09/05/2019 14.1  5.0 - 15.0 mmol/L Final   • WBC 09/05/2019 11.60* 3.40 - 10.80 10*3/mm3 Final   • RBC 09/05/2019 4.34  4.14 - 5.80 10*6/mm3 Final   • Hemoglobin 09/05/2019 12.3* 13.0 - 17.7 g/dL Final   • Hematocrit 09/05/2019 38.2  37.5 - 51.0 % Final   • MCV 09/05/2019 88.0  79.0 - 97.0 fL Final   • MCH 09/05/2019 28.3  26.6 - 33.0 pg Final   • MCHC 09/05/2019 32.2  31.5 - 35.7 g/dL Final   • RDW 09/05/2019 14.5  12.3 - 15.4 % Final   • RDW-SD 09/05/2019 46.3  37.0 - 54.0 fl Final   • MPV 09/05/2019 10.8  6.0 - 12.0 fL Final   • Platelets 09/05/2019 238  140 - 450 10*3/mm3 Final   • Neutrophil % 09/05/2019 82.8* 42.7 - 76.0 % Final   • Lymphocyte % 09/05/2019 11.0* 19.6 - 45.3 % Final   • Monocyte % 09/05/2019 5.5  5.0 - 12.0 % Final   • Eosinophil % 09/05/2019 0.0* 0.3 - 6.2 % Final   • Basophil % 09/05/2019 0.3  0.0 - 1.5 % Final   • Immature Grans % 09/05/2019 0.4  0.0 - 0.5 % Final   • Neutrophils, Absolute 09/05/2019 9.60* 1.70 - 7.00 10*3/mm3 Final   • Lymphocytes, Absolute 09/05/2019 1.28  0.70 - 3.10 10*3/mm3 Final   • Monocytes, Absolute 09/05/2019 0.64  0.10 - 0.90 10*3/mm3 Final   • Eosinophils, Absolute 09/05/2019 0.00  0.00 - 0.40 10*3/mm3 Final   • Basophils, Absolute 09/05/2019 0.03  0.00 - 0.20 10*3/mm3 Final   • Immature Grans, Absolute 09/05/2019 0.05  0.00 - 0.05 10*3/mm3 Final   • nRBC 09/05/2019 0.0  0.0 - 0.2 /100 WBC Final     No results found.    Assessment:  Patient Active Problem List   Diagnosis   • Primary osteoarthritis of knees, bilateral   • Acute dyspnea   • Pericarditis   • Essential hypertension   • Mixed hyperlipidemia   • Primary osteoarthritis of right knee   • Status post total right knee replacement       Plan:  Due to his stiffness and limited improvement with physical therapy, we will proceed with manipulation under anesthesia. We also discussed a dynasplint but we will hold-off  on this for now.   I explained to the patient details of procedure as well as risks, benefits, and alternatives with the risks including not limited to neurovascular damage, bleeding, infection, chronic pain, recurrence of loss of motion, patellar/quad tendon rupture, periprosthetic fracture, incisional dehiscence, weakness, stiffness, instability, DVT, pulmonary embolus, death, stroke, complex regional pain syndrome, myocardial infarction, need for additional procedures.  Patient understood all these had all questions answered prior to consenting to proceed with surgery. No guarantees were given in regards to results of surgery.  We will have patient continue daily physical therapy for 2 weeks after manipulation to try to maintain improvements from manipulation.    Jefry Sabillon was given the opportunity to ask and have all questions answered today.  The patient voiced understanding of the risks, benefits, and alternative forms of treatment that were discussed and the patient consents to proceed with surgery.     Date: 11/11/2019    Dictated utilizing Dragon dictation

## 2019-11-11 NOTE — ANESTHESIA PROCEDURE NOTES
Peripheral Block      Patient location during procedure: pre-op  Reason for block: at surgeon's request and Post-op pain management  Performed by  CRNA: Alexy Brian CRNA  Preanesthetic Checklist  Completed: patient identified, site marked, surgical consent, pre-op evaluation, timeout performed, IV checked, risks and benefits discussed and monitors and equipment checked  Prep:  Pt Position: supine  Sterile barriers:cap, gloves, mask and sterile barriers  Prep: ChloraPrep  Patient monitoring: blood pressure monitoring, continuous pulse oximetry and EKG  Procedure  Sedation:yes  Performed under: local infiltration  Guidance:ultrasound guided  ULTRASOUND INTERPRETATION. Using ultrasound guidance a 21 G gauge needle was placed in close proximity to the nerve, at which point, under ultrasound guidance anesthetic was injected in the area of the nerve and spread of the anesthesia was seen on ultrasound in close proximity thereto.  There were no abnormalities seen on ultrasound; a digital image was taken; and the patient tolerated the procedure with no complications. Images:still images obtained    Block Type:femoral  Injection Technique:single-shot  Needle Type:echogenic  Needle Gauge:21 G  Resistance on Injection: none    Medications Used: ropivacaine (NAROPIN) 0.2% injection, 20 mL  Med admintered at 11/11/2019 1:50 PM      Post Assessment  Injection Assessment: negative aspiration for heme, no paresthesia on injection and incremental injection  Patient Tolerance:comfortable throughout block  Complications:no  Additional Notes  1% lido used for local infiltration of skin.  Easy and effective X 1 attempt.  VSS throughout procedure.

## 2019-11-11 NOTE — ANESTHESIA POSTPROCEDURE EVALUATION
Patient: Jefry Sabillon    Procedure Summary     Date:  11/11/19 Room / Location:   [facility]    Anesthesia Start:  1402 Anesthesia Stop:  1419    Procedure:  KNEE MANIPULATION (Right ) Diagnosis:       Status post total right knee replacement      (Status post total right knee replacement [Z96.651])    Surgeon:  Lopez Mejias MD Provider:  Alexy Brian CRNA    Anesthesia Type:  regional ASA Status:  3          Anesthesia Type: regional  Last vitals  BP   107/60 (11/11/19 1420)   Temp   98 °F (36.7 °C) (11/11/19 1200)   Pulse   71 (11/11/19 1420)   Resp   12 (11/11/19 1420)     SpO2   99 % (11/11/19 1420)     Post Anesthesia Care and Evaluation    Patient location during evaluation: PHASE II  Patient participation: complete - patient participated  Level of consciousness: awake and alert  Pain score: 0  Pain management: satisfactory to patient  Airway patency: patent  Anesthetic complications: No anesthetic complications  PONV Status: none  Cardiovascular status: acceptable  Respiratory status: acceptable  Hydration status: acceptable

## 2019-11-11 NOTE — OP NOTE
Date of Operation: 11/11/2019     PREOPERATIVE DIAGNOSIS: Right knee arthrofibrosis, status post total knee arthroplasty    POSTOPERATIVE DIAGNOSIS: Right knee arthrofibrosis, status post total knee arthroplasty      PROCEDURE PERFORMED: Right knee manipulation under anesthesia with intra-articular injection    SURGEON: Lopez Mejias MD     ASSISTANT: None   ANESTHESIA: MAC with regional block.       ESTIMATED BLOOD LOSS: none     URINE OUTPUT: Not recorded.       FLUIDS: Per anesthesia.       COMPLICATIONS: None.       SPECIMENS: None.       DRAINS: None.     No Tourniquet Times Documented      IMPLANTS: None     INDICATIONS FOR PROCEDURE: The patient is a pleasant 69 y.o. male with significant history of right total knee arthroplasty with subsequent arthrofibrosis. I discussed treatment options available to the patient and patient wished to proceed with manipulation given failure with physical therapy and home exercises. I explained details of the procedure, as well as the risks, benefits, and alternatives as documented on history and physical, and the patient had all questions answered prior to signing the operative consent form. No guarantees were given in regard to results of the surgery.       DESCRIPTION OF PROCEDURE: The patient was seen, evaluated, and cleared for surgery by anesthesia. Admitted in the preoperative holding area. The operative site was marked, consent was reviewed, history and physical was updated, and preoperative labs were reviewed. A regional block was then placed per anesthesia. The patient was then taken to the operating room kept in a supine position on his stretcher. After successful sedation per anesthesia, examination under anesthesia was completed at this time confirming passive knee range of motion from 0 to 85 degrees with well-healed midline incision    A formal timeout was completed, including confirmation of History and Physical, operative consent, surgical site, patient  identification number, and preoperative antibiotic administration.      Attention was then turned to manipulation under anesthesia with short lever arm manipulation being completed with pressure through the mid to proximal tibia with slow steady gentle pressure.  Audible popping and release of tissue was noted at this point time with significant improvement in flexion to 125 degrees.  Passive motion was also obtained from 0 125 degrees.  In order to continue to hopefully improve motion, sterile injection was completed with use of Betadine to completely sterilize the suprapatellar region.  Injection of 6 cc of 1% lidocaine and 40 mg Kenalog was injected from a superior lateral approach.  At the end of the procedure, all lap, needle, and sponge counts were correct x2. The patient had brisk capillary refill to all digits of the right lower extremity. Compartments were soft and easily compressible at the end of the procedure.       DISPOSITION: The patient was extubated per anesthesia and taken to the recovery room in stable condition. Will follow up in office in 1 week for motion check. Results discussed immediately after procedure with family and all questions were answered at that time.  Patient will need daily physical therapy for 2 weeks to try to maintain and improve upon his flexion.

## 2019-11-12 ENCOUNTER — HOSPITAL ENCOUNTER (OUTPATIENT)
Dept: PHYSICAL THERAPY | Facility: HOSPITAL | Age: 69
Setting detail: THERAPIES SERIES
Discharge: HOME | End: 2019-11-12

## 2019-11-12 DIAGNOSIS — Z96.651 STATUS POST TOTAL RIGHT KNEE REPLACEMENT: Primary | ICD-10-CM

## 2019-11-12 PROCEDURE — 97110 THERAPEUTIC EXERCISES: CPT | Performed by: PHYSICAL THERAPIST

## 2019-11-12 NOTE — THERAPY RE-EVALUATION
Outpatient Physical Therapy Ortho Re-Evaluation   Linda Holm     Patient Name: Jefry Sabillon  : 1950  MRN: [id number]  Today's Date: 2019      Visit Date: 2019    Patient Active Problem List   Diagnosis   • Primary osteoarthritis of knees, bilateral   • Acute dyspnea   • Pericarditis   • Essential hypertension   • Mixed hyperlipidemia   • Primary osteoarthritis of right knee   • Status post total right knee replacement        Past Medical History:   Diagnosis Date   • Arthritis    • Asthma    • Cardiac disease    • Coronary artery disease    • Diabetes mellitus (CMS/[facility])     take Metformin   • Heart disease    • Hypertension    • Knee sprain    • Mini stroke (CMS/[facility])    • EVE (obstructive sleep apnea)    • Rotator cuff syndrome         Past Surgical History:   Procedure Laterality Date   • HERNIA REPAIR     • JOINT MANIPULATION Right 2019    Procedure: KNEE MANIPULATION;  Surgeon: Lopez Mejias MD;  Location: [facility] OR;  Service: Orthopedics   • KNEE SURGERY     • TOTAL KNEE ARTHROPLASTY Right 2019    Procedure: TOTAL KNEE ARTHROPLASTY AND ALL ASSOCIATED PROCEDURES;  Surgeon: Lopez Mejias MD;  Location: [facility] OR;  Service: Orthopedics       Visit Dx:     ICD-10-CM ICD-9-CM   1. Status post total right knee replacement Z96.651 V43.65         Patient History     Row Name 19 1030             History    Chief Complaint  Difficulty Walking;Difficulty with daily activities;Tightness;Pain  -GC      Type of Pain  Knee pain right  -GC      Brief Description of Current Complaint  Pt underwent closed manipulation of right knee 2019. Dr. Randell huntleye dhe was able to get knee flexion to 125 degrees under anesthesia. P tis now referred for therapy daily for the next 2 weeks.  -GC      Patient/Caregiver Goals  Relieve pain;Return to prior level of function;Improve mobility  -GC         Pain     Pain Location  Knee right  -GC      Pain at Present  0  -GC       Pain at Best  0  -GC      Pain at Worst  3  -GC        User Key  (r) = Recorded By, (t) = Taken By, (c) = Cosigned By    Initials Name Provider Type     Jose Adam, PT Physical Therapist          PT Ortho     Row Name 11/12/19 1030       Posture/Observations    Posture/Observations Comments  Pt has moderated edema right knee  -GC       Patellar Accessory Motions    Superior glide  Right:;WNL  -GC    Inferior glide  Right:;WNL  -GC    Medial glide  Right:;WNL  -GC    Lateral glide  Right:;WNL  -GC       Knee Special Tests    Bo’s sign (DVT)  Right:;Negative  -GC       Right Lower Ext    Rt Knee Extension/Flexion AROM  0-6-85 degrees prior to treatment and 0-1-100 degrees after stretching  -GC       MMT Right Lower Ext    Rt Knee Extension MMT, Gross Movement  (4/5) good  -GC    Rt Knee Flexion MMT, Gross Movement  (4+/5) good plus  -GC    Rt Ankle Plantarflexion MMT, Gross Movement  (5/5) normal  -GC    Rt Ankle Dorsiflexion MMT, Gross Movement  (5/5) normal  -       Lower Extremity Flexibility    Hamstrings  Right:;Mildly limited  -GC    Quadriceps  Right:;Moderately limited  -GC       Transfers    Comment (Transfers)  Pt is independent with all bed mobility and transfers  -       Gait/Stairs Assessment/Training    Comment (Gait/Stairs)  Pt ambulates with antalgic gait right LE with decreased stride length right compared to left  -      User Key  (r) = Recorded By, (t) = Taken By, (c) = Cosigned By    Initials Name Provider Type     Jose Adam PT Physical Therapist                            PT OP Goals     Row Name 11/12/19 1030          PT Short Term Goals    STG Date to Achieve  09/27/19  -     STG 1  Decrease right knee pain to 1-2/10 with activity.  -     STG 2  Increase right knee AROM to 0-110 degrees with testing.  -     STG 3  Increase right LE strength to at least 4+/5 all planes with testing.  -     STG 4  Pt will be independent with his HEP issued by this therapist.  -         Long Term Goals    LTG Date to Achieve  10/18/19  -     LTG 1  Decrease right knee pain to 0-1/10 with activity.  -     LTG 2  Increase right knee AROM to 0-125 degrees with testing.  -GC     LTG 3  Increase right LE strength to at least 5/5 all planes with testing.  -     LTG 4  Pt will ambulate normally on levels and stairs.  -     LTG 5  Pt will be independent with all ADLs and have a LEFS score > 65.  -       User Key  (r) = Recorded By, (t) = Taken By, (c) = Cosigned By    Initials Name Provider Type     Jose Adam, PT Physical Therapist          PT Assessment/Plan     Row Name 11/12/19 1030          PT Assessment    Assessment Comments  Pt does show some increased ROM after manipulation, bu the is still showing a limitation/restriction.  -        PT Plan    PT Frequency  5x/week 2-3x weekly after the first 2 weeks  -     Predicted Duration of Therapy Intervention (Therapy Eval)  2 weeks additional 2 weeks after  -     PT Plan Comments  Pt is to continue his HEP of stretches daily.  -       User Key  (r) = Recorded By, (t) = Taken By, (c) = Cosigned By    Initials Name Provider Type     Jose Adam, PT Physical Therapist            OP Exercises     Row Name 11/12/19 1030             Exercise 1    Exercise Name 1  Heel Slides  -GC      Time 1  10 min  -GC         Exercise 2    Exercise Name 2  Wall Slides  -      Time 2  10 min  -GC         Exercise 3    Exercise Name 3  Bike seat 1  -GC      Time 3  8 min  -GC         Exercise 4    Exercise Name 4  Towel Slides in sitting for knee FLEX  -      Cueing 4  Verbal;Tactile  -GC      Time 4  5 min  -GC         Exercise 5    Exercise Name 5  Passive knee FLEX stretch in supine  -      Reps 5  10  -GC      Time 5  10 secs  -GC         Exercise 6    Exercise Name 6  Hamstring stretch  -      Reps 6  15  -GC      Time 6  10 secs  -GC         Exercise 7    Exercise Name 7  Supine knee extension on roll   -GC      Time 7  5min with  3# wt above knee  -GC         Exercise 8    Exercise Name 8  Passive knee extension stretch  -GC      Reps 8  10  -GC      Time 8  10 secs  -GC        User Key  (r) = Recorded By, (t) = Taken By, (c) = Cosigned By    Initials Name Provider Type    GC Jose Adam, PT Physical Therapist                                  Time Calculation:     Start Time: 1030  Stop Time: 1213  Time Calculation (min): 103 min     Therapy Charges for Today     Code Description Service Date Service Provider Modifiers Qty    [id number]  PT THER PROC EA 15 MIN 11/12/2019 Jose Adam, PT GP 1                    Jose Adam, PT  11/12/2019

## 2019-11-13 ENCOUNTER — HOSPITAL ENCOUNTER (OUTPATIENT)
Dept: PHYSICAL THERAPY | Facility: HOSPITAL | Age: 69
Setting detail: THERAPIES SERIES
Discharge: HOME | End: 2019-11-13

## 2019-11-13 DIAGNOSIS — Z96.651 STATUS POST TOTAL RIGHT KNEE REPLACEMENT: Primary | ICD-10-CM

## 2019-11-13 PROCEDURE — 97110 THERAPEUTIC EXERCISES: CPT

## 2019-11-13 PROCEDURE — 97140 MANUAL THERAPY 1/> REGIONS: CPT

## 2019-11-13 NOTE — THERAPY TREATMENT NOTE
Outpatient Physical Therapy Ortho Treatment Note   Linda Holm     Patient Name: Jefry Sabillon  : 1950  MRN: [id number]  Today's Date: 2019      Visit Date: 2019    Visit Dx:    ICD-10-CM ICD-9-CM   1. Status post total right knee replacement Z96.651 V43.65       Patient Active Problem List   Diagnosis   • Primary osteoarthritis of knees, bilateral   • Acute dyspnea   • Pericarditis   • Essential hypertension   • Mixed hyperlipidemia   • Primary osteoarthritis of right knee   • Status post total right knee replacement        Past Medical History:   Diagnosis Date   • Arthritis    • Asthma    • Cardiac disease    • Coronary artery disease    • Diabetes mellitus (CMS/[facility])     take Metformin   • Heart disease    • Hypertension    • Knee sprain    • Mini stroke (CMS/[facility])    • EVE (obstructive sleep apnea)    • Rotator cuff syndrome         Past Surgical History:   Procedure Laterality Date   • HERNIA REPAIR     • JOINT MANIPULATION Right 2019    Procedure: KNEE MANIPULATION;  Surgeon: Lopez Mejias MD;  Location: [facility] OR;  Service: Orthopedics   • KNEE SURGERY     • TOTAL KNEE ARTHROPLASTY Right 2019    Procedure: TOTAL KNEE ARTHROPLASTY AND ALL ASSOCIATED PROCEDURES;  Surgeon: Lopez Mejias MD;  Location: [facility] OR;  Service: Orthopedics       PT Ortho     Row Name 19 0955       Right Lower Ext    Rt Knee Extension/Flexion AROM  0-1-104 AAROM post stretch  -KM    Row Name 19 1030       Posture/Observations    Posture/Observations Comments  Pt has moderated edema right knee  -GC       Patellar Accessory Motions    Superior glide  Right:;WNL  -GC    Inferior glide  Right:;WNL  -GC    Medial glide  Right:;WNL  -GC    Lateral glide  Right:;WNL  -GC       Knee Special Tests    Bo’s sign (DVT)  Right:;Negative  -GC       Right Lower Ext    Rt Knee Extension/Flexion AROM  0-6-85 degrees prior to treatment and 0-1-100 degrees after stretching  -GC       MMT  "Right Lower Ext    Rt Knee Extension MMT, Gross Movement  (4/5) good  -GC    Rt Knee Flexion MMT, Gross Movement  (4+/5) good plus  -GC    Rt Ankle Plantarflexion MMT, Gross Movement  (5/5) normal  -GC    Rt Ankle Dorsiflexion MMT, Gross Movement  (5/5) normal  -GC       Lower Extremity Flexibility    Hamstrings  Right:;Mildly limited  -GC    Quadriceps  Right:;Moderately limited  -GC       Transfers    Comment (Transfers)  Pt is independent with all bed mobility and transfers  -GC       Gait/Stairs Assessment/Training    Comment (Gait/Stairs)  Pt ambulates with antalgic gait right LE with decreased stride length right compared to left  -GC      User Key  (r) = Recorded By, (t) = Taken By, (c) = Cosigned By    Initials Name Provider Type    Jose Hilliard, PT Physical Therapist    Mckayla Yu PTA Physical Therapy Assistant                      PT Assessment/Plan     Row Name 11/13/19 0984          PT Assessment    Assessment Comments  Improved ROM post stretch compared to yesterdays treatment, however much assistant required to achieve measurement.   -KM        PT Plan    PT Plan Comments  Continue to push ROM.  -KM       User Key  (r) = Recorded By, (t) = Taken By, (c) = Cosigned By    Initials Name Provider Type    Mckayla Yu PTA Physical Therapy Assistant            OP Exercises     Row Name 11/13/19 3517             Subjective Comments    Subjective Comments  Pt states his knee feels a bit stiff today. States he felt more of a \"burning\" sensation in his knee last night.   -KM         Exercise 1    Exercise Name 1  Heel Slides  -KM      Time 1  10 min  -KM         Exercise 2    Exercise Name 2  Wall Slides  -KM      Time 2  10 min  -KM         Exercise 3    Exercise Name 3  Bike seat 1  -KM      Time 3  8 min  -KM         Exercise 4    Exercise Name 4  Towel Slides in sitting for knee FLEX  -KM      Cueing 4  Verbal;Tactile  -KM      Time 4  5 min  -KM         Exercise 5    Exercise Name 5  " Passive knee FLEX stretch in supine  -KM      Reps 5  10  -KM      Time 5  10 secs  -KM         Exercise 6    Exercise Name 6  Hamstring stretch  -KM      Reps 6  15  -KM      Time 6  10 secs  -KM         Exercise 7    Exercise Name 7  Supine knee extension on roll   -KM      Time 7  5min with 3# wt above knee  -KM         Exercise 8    Exercise Name 8  Passive knee extension stretch  -KM      Reps 8  10  -KM      Time 8  10 secs  -KM        User Key  (r) = Recorded By, (t) = Taken By, (c) = Cosigned By    Initials Name Provider Type    Mckayla Yu PTA Physical Therapy Assistant                                          Time Calculation:   Start Time: 0955  Stop Time: 1105  Time Calculation (min): 70 min  Therapy Charges for Today     Code Description Service Date Service Provider Modifiers Qty    56773745022 HC PT MANUAL THERAPY EA 15 MIN 11/13/2019 Mckayla Perdomo PTA GP 1    73251114761 HC PT THER PROC EA 15 MIN 11/13/2019 Mckayla Perdomo PTA GP 1                    Mckayla Perdomo PTA  11/13/2019

## 2019-11-14 ENCOUNTER — HOSPITAL ENCOUNTER (OUTPATIENT)
Dept: PHYSICAL THERAPY | Facility: HOSPITAL | Age: 69
Setting detail: THERAPIES SERIES
Discharge: HOME | End: 2019-11-14

## 2019-11-14 DIAGNOSIS — Z96.651 STATUS POST TOTAL RIGHT KNEE REPLACEMENT: Primary | ICD-10-CM

## 2019-11-14 PROCEDURE — 97110 THERAPEUTIC EXERCISES: CPT | Performed by: PHYSICAL THERAPIST

## 2019-11-14 NOTE — THERAPY TREATMENT NOTE
Outpatient Physical Therapy Ortho Treatment Note   Linda Holm     Patient Name: Jefry Sabillon  : 1950  MRN: [id number]  Today's Date: 2019      Visit Date: 2019    Visit Dx:    ICD-10-CM ICD-9-CM   1. Status post total right knee replacement Z96.651 V43.65       Patient Active Problem List   Diagnosis   • Primary osteoarthritis of knees, bilateral   • Acute dyspnea   • Pericarditis   • Essential hypertension   • Mixed hyperlipidemia   • Primary osteoarthritis of right knee   • Status post total right knee replacement        Past Medical History:   Diagnosis Date   • Arthritis    • Asthma    • Cardiac disease    • Coronary artery disease    • Diabetes mellitus (CMS/[facility])     take Metformin   • Heart disease    • Hypertension    • Knee sprain    • Mini stroke (CMS/[facility])    • EVE (obstructive sleep apnea)    • Rotator cuff syndrome         Past Surgical History:   Procedure Laterality Date   • HERNIA REPAIR     • JOINT MANIPULATION Right 2019    Procedure: KNEE MANIPULATION;  Surgeon: Lopez Mejias MD;  Location: [facility] OR;  Service: Orthopedics   • KNEE SURGERY     • TOTAL KNEE ARTHROPLASTY Right 2019    Procedure: TOTAL KNEE ARTHROPLASTY AND ALL ASSOCIATED PROCEDURES;  Surgeon: Lopez Mejias MD;  Location: [facility] OR;  Service: Orthopedics       PT Ortho     Row Name 19 0950       Subjective Comments    Subjective Comments  Pt states his knee is sore, but he feels like he can move it a little better.  -GC    Row Name 19 0955       Right Lower Ext    Rt Knee Extension/Flexion AROM  0-1-104 AAROM post stretch  -KM    Row Name 19 1030       Posture/Observations    Posture/Observations Comments  Pt has moderated edema right knee  -GC       Patellar Accessory Motions    Superior glide  Right:;WNL  -GC    Inferior glide  Right:;WNL  -GC    Medial glide  Right:;WNL  -GC    Lateral glide  Right:;WNL  -GC       Knee Special Tests    Bo’s sign (DVT)   Right:;Negative  -GC       Right Lower Ext    Rt Knee Extension/Flexion AROM  0-6-85 degrees prior to treatment and 0-1-100 degrees after stretching  -GC       MMT Right Lower Ext    Rt Knee Extension MMT, Gross Movement  (4/5) good  -GC    Rt Knee Flexion MMT, Gross Movement  (4+/5) good plus  -GC    Rt Ankle Plantarflexion MMT, Gross Movement  (5/5) normal  -GC    Rt Ankle Dorsiflexion MMT, Gross Movement  (5/5) normal  -GC       Lower Extremity Flexibility    Hamstrings  Right:;Mildly limited  -GC    Quadriceps  Right:;Moderately limited  -GC       Transfers    Comment (Transfers)  Pt is independent with all bed mobility and transfers  -       Gait/Stairs Assessment/Training    Comment (Gait/Stairs)  Pt ambulates with antalgic gait right LE with decreased stride length right compared to left  -      User Key  (r) = Recorded By, (t) = Taken By, (c) = Cosigned By    Initials Name Provider Type     Jose Adam, PT Physical Therapist     Mckayla Perdomo, PTA Physical Therapy Assistant                      PT Assessment/Plan     Row Name 11/14/19 9329          PT Assessment    Assessment Comments  Pt is showing some gains in his knee ROM, but it is still very tight and restricted.  -        PT Plan    PT Plan Comments  Pt is to continue his HEP 2-3x daily wiht therapy daily through next week.  -       User Key  (r) = Recorded By, (t) = Taken By, (c) = Cosigned By    Initials Name Provider Type     Jose Adam, PT Physical Therapist            OP Exercises     Row Name 11/14/19 5797             Subjective Comments    Subjective Comments  Pt states his knee is sore, but he feels like he can move it a little better.  -GC         Exercise 1    Exercise Name 1  Heel Slides  -GC      Time 1  10 min  -GC         Exercise 2    Exercise Name 2  Wall Slides  -GC      Time 2  10 min  -GC         Exercise 3    Exercise Name 3  Bike seat 1  -GC      Time 3  8 min  -GC         Exercise 4    Exercise Name 4  Towel  Slides in sitting for knee FLEX  -GC      Cueing 4  Verbal;Tactile  -GC      Time 4  5 min  -GC         Exercise 5    Exercise Name 5  Passive knee FLEX stretch in supine  -GC      Reps 5  10  -GC      Time 5  10 secs  -GC         Exercise 6    Exercise Name 6  Hamstring stretch  -GC      Reps 6  15  -GC      Time 6  10 secs  -GC         Exercise 7    Exercise Name 7  Supine knee extension on roll   -GC      Time 7  5min with 3# wt above knee  -GC         Exercise 8    Exercise Name 8  Passive knee extension stretch  -GC      Reps 8  10  -GC      Time 8  10 secs  -GC        User Key  (r) = Recorded By, (t) = Taken By, (c) = Cosigned By    Initials Name Provider Type     Jose Adam, PT Physical Therapist                                          Time Calculation:   Start Time: 0950  Stop Time: 1104  Time Calculation (min): 74 min  Therapy Charges for Today     Code Description Service Date Service Provider Modifiers Qty    88106508115 HC PT THER PROC EA 15 MIN 11/14/2019 Jose Adam, PT GP 3                    Jose Adam, PT  11/14/2019

## 2019-11-15 ENCOUNTER — HOSPITAL ENCOUNTER (OUTPATIENT)
Dept: PHYSICAL THERAPY | Facility: HOSPITAL | Age: 69
Setting detail: THERAPIES SERIES
Discharge: HOME | End: 2019-11-15

## 2019-11-15 DIAGNOSIS — Z96.651 STATUS POST TOTAL RIGHT KNEE REPLACEMENT: Primary | ICD-10-CM

## 2019-11-15 PROCEDURE — 97110 THERAPEUTIC EXERCISES: CPT

## 2019-11-15 PROCEDURE — 97140 MANUAL THERAPY 1/> REGIONS: CPT

## 2019-11-15 NOTE — THERAPY TREATMENT NOTE
Outpatient Physical Therapy Ortho Treatment Note   Linda Holm     Patient Name: Jefry Sabillon  : 1950  MRN: [id number]  Today's Date: 11/15/2019      Visit Date: 11/15/2019    Visit Dx:    ICD-10-CM ICD-9-CM   1. Status post total right knee replacement Z96.651 V43.65       Patient Active Problem List   Diagnosis   • Primary osteoarthritis of knees, bilateral   • Acute dyspnea   • Pericarditis   • Essential hypertension   • Mixed hyperlipidemia   • Primary osteoarthritis of right knee   • Status post total right knee replacement        Past Medical History:   Diagnosis Date   • Arthritis    • Asthma    • Cardiac disease    • Coronary artery disease    • Diabetes mellitus (CMS/[facility])     take Metformin   • Heart disease    • Hypertension    • Knee sprain    • Mini stroke (CMS/[facility])    • EVE (obstructive sleep apnea)    • Rotator cuff syndrome         Past Surgical History:   Procedure Laterality Date   • HERNIA REPAIR     • JOINT MANIPULATION Right 2019    Procedure: KNEE MANIPULATION;  Surgeon: Lopez Mejias MD;  Location: [facility] OR;  Service: Orthopedics   • KNEE SURGERY     • TOTAL KNEE ARTHROPLASTY Right 2019    Procedure: TOTAL KNEE ARTHROPLASTY AND ALL ASSOCIATED PROCEDURES;  Surgeon: Lopez Mejias MD;  Location: [facility] OR;  Service: Orthopedics       PT Ortho     Row Name 19 0971       Subjective Comments    Subjective Comments  Pt states his knee is sore, but he feels like he can move it a little better.  -GC    Row Name 19 0990       Right Lower Ext    Rt Knee Extension/Flexion AROM  0-1-104 AAROM post stretch  -KM      User Key  (r) = Recorded By, (t) = Taken By, (c) = Cosigned By    Initials Name Provider Type    Jose Hilliard, PT Physical Therapist    KM Mckayla Perdomo, PTA Physical Therapy Assistant                      PT Assessment/Plan     Row Name 11/15/19 0902          PT Assessment    Assessment Comments  Pt continues to progress slowly with  active and passive ROM. Instructed pt to completed stretches multiple times over the weekend.   -KM        PT Plan    PT Plan Comments  ROM measurements for MD appointment Tuesday. Continue to push ROM  -KM       User Key  (r) = Recorded By, (t) = Taken By, (c) = Cosigned By    Initials Name Provider Type    Mckayla Yu PTA Physical Therapy Assistant            OP Exercises     Row Name 11/15/19 0945             Subjective Comments    Subjective Comments  Pt states his knee is doing well with minimal soreness. States he has noticed slow improvement with his motion each day.   -KM         Exercise 1    Exercise Name 1  Heel Slides  -KM      Time 1  10 min  -KM         Exercise 2    Exercise Name 2  Wall Slides  -KM      Time 2  10 min  -KM         Exercise 3    Exercise Name 3  Bike seat 1  -KM      Time 3  8 min  -KM         Exercise 4    Exercise Name 4  Towel Slides in sitting for knee FLEX  -KM      Cueing 4  Verbal;Tactile  -KM      Time 4  5 min  -KM         Exercise 5    Exercise Name 5  Passive knee FLEX stretch in supine  -KM      Reps 5  10  -KM      Time 5  10 secs  -KM         Exercise 6    Exercise Name 6  Hamstring stretch  -KM      Reps 6  15  -KM      Time 6  10 secs  -KM         Exercise 7    Exercise Name 7  Supine knee extension on roll   -KM      Time 7  5min with 3# wt above knee  -KM         Exercise 8    Exercise Name 8  Passive knee extension stretch  -KM      Reps 8  10  -KM      Time 8  10 secs  -KM        User Key  (r) = Recorded By, (t) = Taken By, (c) = Cosigned By    Initials Name Provider Type    Mckayla Yu PTA Physical Therapy Assistant                                          Time Calculation:   Start Time: 0945  Stop Time: 1045  Time Calculation (min): 60 min  Therapy Charges for Today     Code Description Service Date Service Provider Modifiers Qty    11303978496  PT MANUAL THERAPY EA 15 MIN 11/15/2019 Mckayla Perdomo PTA GP 1    72733429999 HC PT THER PROC EA 15  MIN 11/15/2019 Mckayla Perdomo, PTA GP 1                    Mckayla Perdomo, ANGELITO  11/15/2019

## 2019-11-18 ENCOUNTER — HOSPITAL ENCOUNTER (OUTPATIENT)
Dept: PHYSICAL THERAPY | Facility: HOSPITAL | Age: 69
Setting detail: THERAPIES SERIES
Discharge: HOME | End: 2019-11-18

## 2019-11-18 DIAGNOSIS — Z96.651 STATUS POST TOTAL RIGHT KNEE REPLACEMENT: Primary | ICD-10-CM

## 2019-11-18 PROCEDURE — 97140 MANUAL THERAPY 1/> REGIONS: CPT

## 2019-11-18 PROCEDURE — 97110 THERAPEUTIC EXERCISES: CPT

## 2019-11-18 NOTE — THERAPY TREATMENT NOTE
Outpatient Physical Therapy Ortho Treatment Note   Linda Holm     Patient Name: Jefry Sabillon  : 1950  MRN: [id number]  Today's Date: 2019      Visit Date: 2019    Visit Dx:    ICD-10-CM ICD-9-CM   1. Status post total right knee replacement Z96.651 V43.65       Patient Active Problem List   Diagnosis   • Primary osteoarthritis of knees, bilateral   • Acute dyspnea   • Pericarditis   • Essential hypertension   • Mixed hyperlipidemia   • Primary osteoarthritis of right knee   • Status post total right knee replacement        Past Medical History:   Diagnosis Date   • Arthritis    • Asthma    • Cardiac disease    • Coronary artery disease    • Diabetes mellitus (CMS/[facility])     take Metformin   • Heart disease    • Hypertension    • Knee sprain    • Mini stroke (CMS/[facility])    • EVE (obstructive sleep apnea)    • Rotator cuff syndrome         Past Surgical History:   Procedure Laterality Date   • HERNIA REPAIR     • JOINT MANIPULATION Right 2019    Procedure: KNEE MANIPULATION;  Surgeon: Lopez Mejias MD;  [facility];  Service: Orthopedics   • KNEE SURGERY     • TOTAL KNEE ARTHROPLASTY Right 2019    Procedure: TOTAL KNEE ARTHROPLASTY AND ALL ASSOCIATED PROCEDURES;  Surgeon: Lopez Mejias MD;  Location: [facility] OR;  Service: Orthopedics       PT Ortho     Row Name 19 0909       Subjective Comments    Subjective Comments  Pt states his knee is doing well, states he is a little sore after cutting moving firewood all weekend. States he has f/u appointment scheduled for tomorrow.   -KM       Right Lower Ext    Rt Knee Extension/Flexion AROM  0-1-105 AAROM post stretch     -KM      User Key  (r) = Recorded By, (t) = Taken By, (c) = Cosigned By    Initials Name Provider Type    Mckayla Yu PTA Physical Therapy Assistant                      PT Assessment/Plan     Row Name 19 0958          PT Assessment    Assessment Comments  Pt ambulates with  improved gait, however, minimal gains have been made with AAROM measurements post stretch.   -KM        PT Plan    PT Plan Comments  Continue to push ROM. Continue POC per MD  -KM       User Key  (r) = Recorded By, (t) = Taken By, (c) = Cosigned By    Initials Name Provider Type    Mckayla Yu PTA Physical Therapy Assistant            OP Exercises     Row Name 11/18/19 0958             Subjective Comments    Subjective Comments  Pt states his knee is doing well, states he is a little sore after cutting moving firewood all weekend. States he has f/u appointment scheduled for tomorrow.   -KM         Exercise 1    Exercise Name 1  Heel Slides  -KM      Time 1  10 min  -KM         Exercise 2    Exercise Name 2  Wall Slides  -KM      Time 2  10 min  -KM         Exercise 3    Exercise Name 3  Bike seat 1  -KM      Time 3  8 min  -KM         Exercise 4    Exercise Name 4  Towel Slides in sitting for knee FLEX  -KM      Cueing 4  Verbal;Tactile  -KM      Time 4  5 min  -KM         Exercise 5    Exercise Name 5  Passive knee FLEX stretch in supine  -KM      Reps 5  10  -KM      Time 5  10 secs  -KM         Exercise 6    Exercise Name 6  Hamstring stretch  -KM      Reps 6  15  -KM      Time 6  10 secs  -KM         Exercise 7    Exercise Name 7  Supine knee extension on roll   -KM      Time 7  5 min  -KM      Additional Comments  4#  -KM         Exercise 8    Exercise Name 8  Passive knee extension stretch  -KM      Reps 8  10  -KM      Time 8  10 secs  -KM        User Key  (r) = Recorded By, (t) = Taken By, (c) = Cosigned By    Initials Name Provider Type    Mckayla Yu PTA Physical Therapy Assistant                                          Time Calculation:   Start Time: 0958  Stop Time: 1055  Time Calculation (min): 57 min  Therapy Charges for Today     Code Description Service Date Service Provider Modifiers Qty    29542211996 HC PT MANUAL THERAPY EA 15 MIN 11/18/2019 Mckayla Perdomo PTA GP 1    [id number]  HC PT THER PROC EA 15 MIN 11/18/2019 Mckayla Perdomo, PTA GP 1                    Mckayla Perdomo, ANGELITO  11/18/2019

## 2019-11-19 ENCOUNTER — HOSPITAL ENCOUNTER (OUTPATIENT)
Dept: PHYSICAL THERAPY | Facility: HOSPITAL | Age: 69
Setting detail: THERAPIES SERIES
Discharge: HOME | End: 2019-11-19

## 2019-11-19 ENCOUNTER — OFFICE VISIT (OUTPATIENT)
Dept: ORTHOPEDIC SURGERY | Facility: CLINIC | Age: 69
End: 2019-11-19

## 2019-11-19 VITALS — WEIGHT: 277 LBS | HEIGHT: 72 IN | BODY MASS INDEX: 37.52 KG/M2

## 2019-11-19 DIAGNOSIS — Z96.651 STATUS POST TOTAL RIGHT KNEE REPLACEMENT: Primary | ICD-10-CM

## 2019-11-19 DIAGNOSIS — Z98.890 STATUS POST SURGICAL MANIPULATION OF KNEE JOINT: Primary | ICD-10-CM

## 2019-11-19 DIAGNOSIS — Z96.651 STATUS POST TOTAL RIGHT KNEE REPLACEMENT: ICD-10-CM

## 2019-11-19 PROCEDURE — 97110 THERAPEUTIC EXERCISES: CPT | Performed by: PHYSICAL THERAPIST

## 2019-11-19 PROCEDURE — 99024 POSTOP FOLLOW-UP VISIT: CPT | Performed by: NURSE PRACTITIONER

## 2019-11-19 NOTE — PROGRESS NOTES
CC: Right knee manipulation under anesthesia with intra-articular injection, 11/11/2019    Interval history: Jefry Sabillon returns to clinic with spouse for follow-up after manipulation right knee.  He is continued with physical therapy tolerating well and is noted significant improvement.  Denies presence of numbness or tingling right lower extremity.  Has continued ambulating which is improved.  Denies other concerns present this time.    Exam:  Right knee examined  Range of motion 0 degrees to 105 degrees with 4+ out of 5 strength  Stable to varus and valgus stress at 0 degrees and 30 degrees  Flex extend all digits right foot without difficulty  Negative calf pain, negative Homans sign  Positive sensation light touch all distributions right lower extremity  Incision well-healed    Assessment: Right knee manipulation under anesthesia with intra-articular injection  Plan:  1.  Discussed plan of care with patient.  We will proceed with Dynasplint for flexion.  Continue physical therapy daily range of motion we will plan to see Dr. Mejias back in 2 weeks.  Verbalized understanding wants to make sugars with plan of care.  Denies other concerns present this time.

## 2019-11-19 NOTE — THERAPY TREATMENT NOTE
Outpatient Physical Therapy Ortho Treatment Note   Linda Holm     Patient Name: Jefry Sabillon  : 1950  MRN: [id number]  Today's Date: 2019      Visit Date: 2019    Visit Dx:    ICD-10-CM ICD-9-CM   1. Status post total right knee replacement Z96.651 V43.65       Patient Active Problem List   Diagnosis   • Primary osteoarthritis of knees, bilateral   • Acute dyspnea   • Pericarditis   • Essential hypertension   • Mixed hyperlipidemia   • Primary osteoarthritis of right knee   • Status post total right knee replacement        Past Medical History:   Diagnosis Date   • Arthritis    • Asthma    • Cardiac disease    • Coronary artery disease    • Diabetes mellitus (CMS/[facility])     take Metformin   • Heart disease    • Hypertension    • Knee sprain    • Mini stroke (CMS/[facility])    • EVE (obstructive sleep apnea)    • Rotator cuff syndrome         Past Surgical History:   Procedure Laterality Date   • HERNIA REPAIR     • JOINT MANIPULATION Right 2019    Procedure: KNEE MANIPULATION;  Surgeon: Lopez Mejias MD;  Location: [facility] OR;  Service: Orthopedics   • KNEE SURGERY     • TOTAL KNEE ARTHROPLASTY Right 2019    Procedure: TOTAL KNEE ARTHROPLASTY AND ALL ASSOCIATED PROCEDURES;  Surgeon: Lopez Mejias MD;  Location: [facility] OR;  Service: Orthopedics       PT Ortho     Row Name 19 1662       Subjective Comments    Subjective Comments  Pt states his knee is doing well, states he is a little sore after cutting moving firewood all weekend. States he has f/u appointment scheduled for tomorrow.   -KM       Right Lower Ext    Rt Knee Extension/Flexion AROM  0-1-105 AAROM post stretch     -KM      User Key  (r) = Recorded By, (t) = Taken By, (c) = Cosigned By    Initials Name Provider Type    Mckayla Yu PTA Physical Therapy Assistant                      PT Assessment/Plan     Row Name 19 7297          PT Assessment    Assessment Comments  Pt is showing some  increased knee flexion range withhis passive stretching, bu this active motion is still restricted.  -GC        PT Plan    PT Plan Comments  Pt is to continue his HEP of stretches and we will continue daily therapy this week.  -GC       User Key  (r) = Recorded By, (t) = Taken By, (c) = Cosigned By    Initials Name Provider Type     Jose Adam, PT Physical Therapist            OP Exercises     Row Name 11/19/19 0930             Subjective Comments    Subjective Comments  Pt states he is going to get a Leia Splint to help with his knee flexion range.  -GC         Exercise 1    Exercise Name 1  Heel Slides  -GC      Time 1  10 min  -GC         Exercise 2    Exercise Name 2  Wall Slides  -GC      Time 2  10 min  -GC         Exercise 3    Exercise Name 3  Bike seat 1  -GC      Time 3  8 min  -GC         Exercise 4    Exercise Name 4  Towel Slides in sitting for knee FLEX  -GC      Cueing 4  Verbal;Tactile  -GC      Time 4  5 min  -GC         Exercise 5    Exercise Name 5  Passive knee FLEX stretch in supine  -GC      Reps 5  10  -GC      Time 5  10 secs  -GC         Exercise 6    Exercise Name 6  Hamstring stretch  -GC      Reps 6  15  -GC      Time 6  10 secs  -GC         Exercise 7    Exercise Name 7  Supine knee extension on roll   -GC      Time 7  5 min  -GC         Exercise 8    Exercise Name 8  Passive knee extension stretch  -GC      Reps 8  10  -GC      Time 8  10 secs  -GC        User Key  (r) = Recorded By, (t) = Taken By, (c) = Cosigned By    Initials Name Provider Type     Jose Adam, PT Physical Therapist                                          Time Calculation:   Start Time: 0930  Stop Time: 1036  Time Calculation (min): 66 min  Therapy Charges for Today     Code Description Service Date Service Provider Modifiers Qty    19281423175  PT THER PROC EA 15 MIN 11/19/2019 Jose Adam, PT GP 2                    Jose Adam PT  11/19/2019

## 2019-11-20 ENCOUNTER — HOSPITAL ENCOUNTER (OUTPATIENT)
Dept: PHYSICAL THERAPY | Facility: HOSPITAL | Age: 69
Setting detail: THERAPIES SERIES
Discharge: HOME | End: 2019-11-20

## 2019-11-20 DIAGNOSIS — Z96.651 STATUS POST TOTAL RIGHT KNEE REPLACEMENT: Primary | ICD-10-CM

## 2019-11-20 PROCEDURE — 97140 MANUAL THERAPY 1/> REGIONS: CPT

## 2019-11-20 PROCEDURE — 97110 THERAPEUTIC EXERCISES: CPT

## 2019-11-20 NOTE — THERAPY TREATMENT NOTE
Outpatient Physical Therapy Ortho Treatment Note   Linda Holm     Patient Name: Jefry Sabillon  : 1950  MRN: [id number]  Today's Date: 2019      Visit Date: 2019    Visit Dx:    ICD-10-CM ICD-9-CM   1. Status post total right knee replacement Z96.651 V43.65       Patient Active Problem List   Diagnosis   • Primary osteoarthritis of knees, bilateral   • Acute dyspnea   • Pericarditis   • Essential hypertension   • Mixed hyperlipidemia   • Primary osteoarthritis of right knee   • Status post total right knee replacement        Past Medical History:   Diagnosis Date   • Arthritis    • Asthma    • Cardiac disease    • Coronary artery disease    • Diabetes mellitus (CMS/[facility])     take Metformin   • Heart disease    • Hypertension    • Knee sprain    • Mini stroke (CMS/[facility])    • EVE (obstructive sleep apnea)    • Rotator cuff syndrome         Past Surgical History:   Procedure Laterality Date   • HERNIA REPAIR     • JOINT MANIPULATION Right 2019    Procedure: KNEE MANIPULATION;  Surgeon: Lopez Mejias MD;  Location: [facility] OR;  Service: Orthopedics   • KNEE SURGERY     • TOTAL KNEE ARTHROPLASTY Right 2019    Procedure: TOTAL KNEE ARTHROPLASTY AND ALL ASSOCIATED PROCEDURES;  Surgeon: Lopez Mejias MD;  Location: [facility] OR;  Service: Orthopedics       PT Ortho     Row Name 19 1290       Subjective Comments    Subjective Comments  Pt states his knee is doing well, states he is a little sore after cutting moving firewood all weekend. States he has f/u appointment scheduled for tomorrow.   -KM       Right Lower Ext    Rt Knee Extension/Flexion AROM  0-1-105 AAROM post stretch     -KM      User Key  (r) = Recorded By, (t) = Taken By, (c) = Cosigned By    Initials Name Provider Type    Mckayla Yu PTA Physical Therapy Assistant                      PT Assessment/Plan     Row Name 19 7026          PT Assessment    Assessment Comments  Pt continues to tolerated  current treatment plan well, minimal gains in ROM noted  -KM        PT Plan    PT Plan Comments  Continue to push ROM  -KM       User Key  (r) = Recorded By, (t) = Taken By, (c) = Cosigned By    Initials Name Provider Type    Mckayla Yu PTA Physical Therapy Assistant            OP Exercises     Row Name 11/20/19 0945             Subjective Comments    Subjective Comments  Pt states his knee feels good with no new concerns or issues today.   -KM         Exercise 1    Exercise Name 1  Heel Slides  -KM      Time 1  10 min  -KM         Exercise 2    Exercise Name 2  Wall Slides  -KM      Time 2  10 min  -KM         Exercise 3    Exercise Name 3  Bike seat 1  -KM      Time 3  8 min  -KM         Exercise 4    Exercise Name 4  Towel Slides in sitting for knee FLEX  -KM      Cueing 4  Verbal;Tactile  -KM      Time 4  5 min  -KM         Exercise 5    Exercise Name 5  Passive knee FLEX stretch in supine  -KM      Reps 5  10  -KM      Time 5  10 secs  -KM         Exercise 6    Exercise Name 6  Hamstring stretch  -KM      Reps 6  15  -KM      Time 6  10 secs  -KM         Exercise 7    Exercise Name 7  Prone Leg Hang  -KM      Time 7  5 min  -KM      Additional Comments  2#  -KM         Exercise 8    Exercise Name 8  Passive knee extension stretch  -KM      Reps 8  10  -KM      Time 8  10 secs  -KM        User Key  (r) = Recorded By, (t) = Taken By, (c) = Cosigned By    Initials Name Provider Type    Mcakyla Yu PTA Physical Therapy Assistant                                          Time Calculation:   Start Time: 0945  Stop Time: 1050  Time Calculation (min): 65 min  Therapy Charges for Today     Code Description Service Date Service Provider Modifiers Qty    71225478987 HC PT MANUAL THERAPY EA 15 MIN 11/20/2019 Mckayla Perdomo PTA GP 1    80186354492 HC PT THER PROC EA 15 MIN 11/20/2019 Mckayla Perdomo PTA GP 1                    Mckayla Perdomo PTA  11/20/2019

## 2019-11-21 ENCOUNTER — HOSPITAL ENCOUNTER (OUTPATIENT)
Dept: PHYSICAL THERAPY | Facility: HOSPITAL | Age: 69
Setting detail: THERAPIES SERIES
Discharge: HOME | End: 2019-11-21

## 2019-11-21 DIAGNOSIS — Z96.651 STATUS POST TOTAL RIGHT KNEE REPLACEMENT: Primary | ICD-10-CM

## 2019-11-21 PROBLEM — Z98.890 STATUS POST SURGICAL MANIPULATION OF KNEE JOINT: Status: ACTIVE | Noted: 2019-09-04

## 2019-11-21 PROCEDURE — 97110 THERAPEUTIC EXERCISES: CPT | Performed by: PHYSICAL THERAPIST

## 2019-11-21 NOTE — THERAPY TREATMENT NOTE
Outpatient Physical Therapy Ortho Treatment Note   Linda Holm     Patient Name: Jefry Sabillon  : 1950  MRN: [id number]  Today's Date: 2019      Visit Date: 2019    Visit Dx:    ICD-10-CM ICD-9-CM   1. Status post total right knee replacement Z96.651 V43.65       Patient Active Problem List   Diagnosis   • Primary osteoarthritis of knees, bilateral   • Acute dyspnea   • Pericarditis   • Essential hypertension   • Mixed hyperlipidemia   • Primary osteoarthritis of right knee   • Status post total right knee replacement        Past Medical History:   Diagnosis Date   • Arthritis    • Asthma    • Cardiac disease    • Coronary artery disease    • Diabetes mellitus (CMS/[facility])     take Metformin   • Heart disease    • Hypertension    • Knee sprain    • Mini stroke (CMS/[facility])    • EVE (obstructive sleep apnea)    • Rotator cuff syndrome         Past Surgical History:   Procedure Laterality Date   • HERNIA REPAIR     • JOINT MANIPULATION Right 2019    Procedure: KNEE MANIPULATION;  Surgeon: Lopez Mejias MD;  Location: [facility] OR;  Service: Orthopedics   • KNEE SURGERY     • TOTAL KNEE ARTHROPLASTY Right 2019    Procedure: TOTAL KNEE ARTHROPLASTY AND ALL ASSOCIATED PROCEDURES;  Surgeon: Lopez Mejias MD;  Location: [facility] OR;  Service: Orthopedics       PT Ortho     Row Name 19 0346       Subjective Comments    Subjective Comments  Pt states he is not having any pain withhis ADLs.  -      User Key  (r) = Recorded By, (t) = Taken By, (c) = Cosigned By    Initials Name Provider Type     Jose Adam, PT Physical Therapist                      PT Assessment/Plan     Row Name 19 7410          PT Assessment    Assessment Comments  Pt is shwoing slow but steady progress with his ROM.  -        PT Plan    PT Plan Comments  Pt is to continue his HEP 2x daily.  -       User Key  (r) = Recorded By, (t) = Taken By, (c) = Cosigned By    Initials Name Provider Type      Jose Adam, PT Physical Therapist            OP Exercises     Row Name 11/21/19 0940             Subjective Comments    Subjective Comments  Pt states he is not having any pain withhis ADLs.  -GC         Exercise 1    Exercise Name 1  Heel Slides  -GC      Time 1  10 min  -GC         Exercise 2    Exercise Name 2  Wall Slides  -GC      Time 2  10 min  -GC         Exercise 3    Exercise Name 3  Bike seat 1  -GC      Time 3  8 min  -GC         Exercise 4    Exercise Name 4  Towel Slides in sitting for knee FLEX  -GC      Cueing 4  Verbal;Tactile  -GC      Time 4  5 min  -GC         Exercise 5    Exercise Name 5  Passive knee FLEX stretch in supine  -GC      Reps 5  10  -GC      Time 5  10 secs  -GC         Exercise 6    Exercise Name 6  Hamstring stretch  -GC      Reps 6  15  -GC      Time 6  10 secs  -GC         Exercise 7    Exercise Name 7  Supine knee EXT on roll  -GC      Time 7  5 min  -GC      Additional Comments  5#  -GC         Exercise 8    Exercise Name 8  Passive knee extension stretch  -GC      Reps 8  10  -GC      Time 8  10 secs  -GC        User Key  (r) = Recorded By, (t) = Taken By, (c) = Cosigned By    Initials Name Provider Type     Jose Adam, PT Physical Therapist                                          Time Calculation:   Start Time: 0940  Stop Time: 1035  Time Calculation (min): 55 min  Therapy Charges for Today     Code Description Service Date Service Provider Modifiers Qty    84269505343  PT THER PROC EA 15 MIN 11/21/2019 Jose Adam, PT GP 2                    Jose Adam, VEL  11/21/2019

## 2019-11-22 ENCOUNTER — HOSPITAL ENCOUNTER (OUTPATIENT)
Dept: PHYSICAL THERAPY | Facility: HOSPITAL | Age: 69
Setting detail: THERAPIES SERIES
Discharge: HOME | End: 2019-11-22

## 2019-11-22 PROCEDURE — 97110 THERAPEUTIC EXERCISES: CPT

## 2019-11-22 PROCEDURE — 97140 MANUAL THERAPY 1/> REGIONS: CPT

## 2019-11-22 NOTE — THERAPY TREATMENT NOTE
Outpatient Physical Therapy Ortho Treatment Note   Linda Holm     Patient Name: Jefry Sabillon  : 1950  MRN: [id number]  Today's Date: 2019      Visit Date: 2019    Visit Dx:  No diagnosis found.    Patient Active Problem List   Diagnosis   • Primary osteoarthritis of knees, bilateral   • Acute dyspnea   • Pericarditis   • Essential hypertension   • Mixed hyperlipidemia   • Primary osteoarthritis of right knee   • Status post surgical manipulation of knee joint        Past Medical History:   Diagnosis Date   • Arthritis    • Asthma    • Cardiac disease    • Coronary artery disease    • Diabetes mellitus (CMS/[facility])     take Metformin   • Heart disease    • Hypertension    • Knee sprain    • Mini stroke (CMS/[facility])    • EVE (obstructive sleep apnea)    • Rotator cuff syndrome         Past Surgical History:   Procedure Laterality Date   • HERNIA REPAIR     • JOINT MANIPULATION Right 2019    Procedure: KNEE MANIPULATION;  Surgeon: Lopez Mejias MD;  Location: [facility] OR;  Service: Orthopedics   • KNEE SURGERY     • TOTAL KNEE ARTHROPLASTY Right 2019    Procedure: TOTAL KNEE ARTHROPLASTY AND ALL ASSOCIATED PROCEDURES;  Surgeon: Lopez Mejias MD;  Location: [facility] OR;  Service: Orthopedics       PT Ortho     Row Name 19 1572       Subjective Comments    Subjective Comments  Pt states he is not having any pain withhis ADLs.  -GC      User Key  (r) = Recorded By, (t) = Taken By, (c) = Cosigned By    Initials Name Provider Type    Jose Hilliard, PT Physical Therapist                      PT Assessment/Plan     Row Name 19 8886          PT Assessment    Assessment Comments  Pt demonstrates improved motion with passive stretching. Pt states his knee feels looser at completion of session. Plan to see pt 3x/wweek  -KM        PT Plan    PT Plan Comments  Pt to complete stretching at home, continue to push ROM  -KM       User Key  (r) = Recorded By, (t) = Taken By,  (c) = Cosigned By    Initials Name Provider Type    Mckayla Yu PTA Physical Therapy Assistant            OP Exercises     Row Name 11/22/19 0945             Subjective Comments    Subjective Comments  Pt states his knee is tight this morning possibly due to the weather.   -KM         Exercise 1    Exercise Name 1  Heel Slides  -KM      Time 1  10 min  -KM         Exercise 2    Exercise Name 2  Wall Slides  -KM      Time 2  10 min  -KM         Exercise 3    Exercise Name 3  Bike seat 1  -KM      Time 3  8 min  -KM         Exercise 4    Exercise Name 4  Towel Slides in sitting for knee FLEX  -KM      Cueing 4  Verbal;Tactile  -KM      Time 4  5 min  -KM         Exercise 5    Exercise Name 5  Passive knee FLEX stretch in supine  -KM      Reps 5  10  -KM      Time 5  10 secs  -KM         Exercise 6    Exercise Name 6  Hamstring stretch  -KM      Reps 6  15  -KM      Time 6  10 secs  -KM         Exercise 7    Exercise Name 7  Supine knee EXT on roll  -KM      Time 7  5 min  -KM      Additional Comments  5#  -KM         Exercise 8    Exercise Name 8  Passive knee extension stretch  -KM      Reps 8  10  -KM      Time 8  10 secs  -KM        User Key  (r) = Recorded By, (t) = Taken By, (c) = Cosigned By    Initials Name Provider Type    Mckayla Yu PTA Physical Therapy Assistant                                          Time Calculation:   Start Time: 0945  Stop Time: 1045  Time Calculation (min): 60 min  Therapy Charges for Today     Code Description Service Date Service Provider Modifiers Qty    75073169556 HC PT MANUAL THERAPY EA 15 MIN 11/22/2019 Mckayla Perdomo PTA GP 1    04850426094 HC PT THER PROC EA 15 MIN 11/22/2019 Mckayla Perdomo PTA GP 1                    Mckayla Perdomo PTA  11/22/2019

## 2019-11-25 ENCOUNTER — HOSPITAL ENCOUNTER (OUTPATIENT)
Dept: PHYSICAL THERAPY | Facility: HOSPITAL | Age: 69
Setting detail: THERAPIES SERIES
Discharge: HOME | End: 2019-11-25

## 2019-11-25 DIAGNOSIS — Z96.651 STATUS POST TOTAL RIGHT KNEE REPLACEMENT: Primary | ICD-10-CM

## 2019-11-25 PROCEDURE — 97110 THERAPEUTIC EXERCISES: CPT

## 2019-11-25 NOTE — THERAPY TREATMENT NOTE
Outpatient Physical Therapy Ortho Treatment Note   Linda Holm     Patient Name: Jefry Sabillon  : 1950  MRN: [id number]  Today's Date: 2019      Visit Date: 2019    Visit Dx:    ICD-10-CM ICD-9-CM   1. Status post total right knee replacement Z96.651 V43.65       Patient Active Problem List   Diagnosis   • Primary osteoarthritis of knees, bilateral   • Acute dyspnea   • Pericarditis   • Essential hypertension   • Mixed hyperlipidemia   • Primary osteoarthritis of right knee   • Status post surgical manipulation of knee joint        Past Medical History:   Diagnosis Date   • Arthritis    • Asthma    • Cardiac disease    • Coronary artery disease    • Diabetes mellitus (CMS/[facility])     take Metformin   • Heart disease    • Hypertension    • Knee sprain    • Mini stroke (CMS/[facility])    • EVE (obstructive sleep apnea)    • Rotator cuff syndrome         Past Surgical History:   Procedure Laterality Date   • HERNIA REPAIR     • JOINT MANIPULATION Right 2019    Procedure: KNEE MANIPULATION;  Surgeon: Lopez Mejias MD;  [facility];  Service: Orthopedics   • KNEE SURGERY     • TOTAL KNEE ARTHROPLASTY Right 2019    Procedure: TOTAL KNEE ARTHROPLASTY AND ALL ASSOCIATED PROCEDURES;  Surgeon: Lopez Mejias MD;  Location: [facility] OR;  Service: Orthopedics       PT Ortho     Row Name 19 7096       Right Lower Ext    Rt Knee Extension/Flexion AROM  0-1-102 post stretch  -KM      User Key  (r) = Recorded By, (t) = Taken By, (c) = Cosigned By    Initials Name Provider Type    Mckayla Yu PTA Physical Therapy Assistant                            OP Exercises     Row Name 19 2700             Subjective Comments    Subjective Comments  Pt states his knee is doing well and continues to complete HEP daily.   -KM         Exercise 1    Exercise Name 1  Heel Slides  -KM      Time 1  10 min  -KM         Exercise 2    Exercise Name 2  Wall Slides  -KM      Time 2  10 min   -KM         Exercise 3    Exercise Name 3  Bike seat 1  -KM      Time 3  8 min  -KM         Exercise 4    Exercise Name 4  Towel Slides in sitting for knee FLEX  -KM      Cueing 4  Verbal;Tactile  -KM      Time 4  5 min  -KM         Exercise 5    Exercise Name 5  Passive knee FLEX stretch in supine  -KM      Reps 5  10  -KM      Time 5  10 secs  -KM         Exercise 6    Exercise Name 6  Hamstring stretch  -KM      Reps 6  15  -KM      Time 6  10 secs  -KM         Exercise 7    Exercise Name 7  Supine knee EXT on roll  -KM      Time 7  5 min  -KM      Additional Comments  5#  -KM         Exercise 8    Exercise Name 8  Passive knee extension stretch  -KM      Reps 8  10  -KM      Time 8  10 secs  -KM        User Key  (r) = Recorded By, (t) = Taken By, (c) = Cosigned By    Initials Name Provider Type    Mckayla Yu PTA Physical Therapy Assistant                                          Time Calculation:   Start Time: 0940  Stop Time: 1045  Time Calculation (min): 65 min  Therapy Charges for Today     Code Description Service Date Service Provider Modifiers Qty    41026076138 HC PT THER PROC EA 15 MIN 11/25/2019 Mckayla Perdomo PTA GP 1                    Mckayla Perdomo PTA  11/25/2019

## 2019-11-25 NOTE — THERAPY TREATMENT NOTE
Outpatient Physical Therapy Ortho Treatment Note   Linda Holm     Patient Name: Jefry Sabillon  : 1950  MRN: [id number]  Today's Date: 2019      Visit Date: 2019    Visit Dx:    ICD-10-CM ICD-9-CM   1. Status post total right knee replacement Z96.651 V43.65       Patient Active Problem List   Diagnosis   • Primary osteoarthritis of knees, bilateral   • Acute dyspnea   • Pericarditis   • Essential hypertension   • Mixed hyperlipidemia   • Primary osteoarthritis of right knee   • Status post surgical manipulation of knee joint        Past Medical History:   Diagnosis Date   • Arthritis    • Asthma    • Cardiac disease    • Coronary artery disease    • Diabetes mellitus (CMS/[facility])     take Metformin   • Heart disease    • Hypertension    • Knee sprain    • Mini stroke (CMS/[facility])    • EVE (obstructive sleep apnea)    • Rotator cuff syndrome         Past Surgical History:   Procedure Laterality Date   • HERNIA REPAIR     • JOINT MANIPULATION Right 2019    Procedure: KNEE MANIPULATION;  Surgeon: Lopez Mejias MD;  Location: [facility] OR;  Service: Orthopedics   • KNEE SURGERY     • TOTAL KNEE ARTHROPLASTY Right 2019    Procedure: TOTAL KNEE ARTHROPLASTY AND ALL ASSOCIATED PROCEDURES;  Surgeon: Lopez Mejias MD;  Location: [facility] OR;  Service: Orthopedics       PT Ortho     Row Name 19 4832       Right Lower Ext    Rt Knee Extension/Flexion AROM  0-1-102 post stretch  -KM      User Key  (r) = Recorded By, (t) = Taken By, (c) = Cosigned By    Initials Name Provider Type    Mckayla Yu PTA Physical Therapy Assistant                      PT Assessment/Plan     Row Name 19 9295          PT Assessment    Assessment Comments  Pt demonstrates improved ROM passively but limited mesurements with AAROM.   -KM        PT Plan    PT Plan Comments  Continue to push ROM  -KM       User Key  (r) = Recorded By, (t) = Taken By, (c) = Cosigned By    Initials Name Provider  Type    Mckayla Yu PTA Physical Therapy Assistant            OP Exercises     Row Name 11/25/19 0940             Subjective Comments    Subjective Comments  Pt states his knee is doing well and continues to complete HEP daily.   -KM         Exercise 1    Exercise Name 1  Heel Slides  -KM      Time 1  10 min  -KM         Exercise 2    Exercise Name 2  Wall Slides  -KM      Time 2  10 min  -KM         Exercise 3    Exercise Name 3  Bike seat 1  -KM      Time 3  8 min  -KM         Exercise 4    Exercise Name 4  Towel Slides in sitting for knee FLEX  -KM      Cueing 4  Verbal;Tactile  -KM      Time 4  5 min  -KM         Exercise 5    Exercise Name 5  Passive knee FLEX stretch in supine  -KM      Reps 5  10  -KM      Time 5  10 secs  -KM         Exercise 6    Exercise Name 6  Hamstring stretch  -KM      Reps 6  15  -KM      Time 6  10 secs  -KM         Exercise 7    Exercise Name 7  Supine knee EXT on roll  -KM      Time 7  5 min  -KM      Additional Comments  5#  -KM         Exercise 8    Exercise Name 8  Passive knee extension stretch  -KM      Reps 8  10  -KM      Time 8  10 secs  -KM        User Key  (r) = Recorded By, (t) = Taken By, (c) = Cosigned By    Initials Name Provider Type    Mckayla Yu PTA Physical Therapy Assistant                                          Time Calculation:   Start Time: 0940  Stop Time: 1045  Time Calculation (min): 65 min  Therapy Charges for Today     Code Description Service Date Service Provider Modifiers Qty    13704987488 HC PT THER PROC EA 15 MIN 11/25/2019 Mckayla Perdomo PTA GP 1                    Mckayla Perdomo PTA  11/25/2019

## 2019-11-27 ENCOUNTER — HOSPITAL ENCOUNTER (OUTPATIENT)
Dept: PHYSICAL THERAPY | Facility: HOSPITAL | Age: 69
Setting detail: THERAPIES SERIES
Discharge: HOME | End: 2019-11-27

## 2019-11-27 DIAGNOSIS — Z96.651 STATUS POST TOTAL RIGHT KNEE REPLACEMENT: Primary | ICD-10-CM

## 2019-11-27 PROCEDURE — 97110 THERAPEUTIC EXERCISES: CPT

## 2019-11-27 NOTE — THERAPY TREATMENT NOTE
Outpatient Physical Therapy Ortho Treatment Note  FLORI Craven     Patient Name: Jefry Sabillon  : 1950  MRN: [id number]  Today's Date: 2019      Visit Date: 2019    Visit Dx:    ICD-10-CM ICD-9-CM   1. Status post total right knee replacement Z96.651 V43.65       Patient Active Problem List   Diagnosis   • Primary osteoarthritis of knees, bilateral   • Acute dyspnea   • Pericarditis   • Essential hypertension   • Mixed hyperlipidemia   • Primary osteoarthritis of right knee   • Status post surgical manipulation of knee joint        Past Medical History:   Diagnosis Date   • Arthritis    • Asthma    • Cardiac disease    • Coronary artery disease    • Diabetes mellitus (CMS/[facility])     take Metformin   • Heart disease    • Hypertension    • Knee sprain    • Mini stroke (CMS/[facility])    • EVE (obstructive sleep apnea)    • Rotator cuff syndrome         Past Surgical History:   Procedure Laterality Date   • HERNIA REPAIR     • JOINT MANIPULATION Right 2019    Procedure: KNEE MANIPULATION;  Surgeon: Lopez Mejias MD;  [facility];  Service: Orthopedics   • KNEE SURGERY     • TOTAL KNEE ARTHROPLASTY Right 2019    Procedure: TOTAL KNEE ARTHROPLASTY AND ALL ASSOCIATED PROCEDURES;  Surgeon: Lopez Mejias MD;  Location: [facility] OR;  Service: Orthopedics       PT Ortho     Row Name 19 0948       Right Lower Ext    Rt Knee Extension/Flexion AROM  0-1-102 post stretch  -KM      User Key  (r) = Recorded By, (t) = Taken By, (c) = Cosigned By    Initials Name Provider Type    Mckayla Yu PTA Physical Therapy Assistant                      PT Assessment/Plan     Row Name 19 2436          PT Assessment    Assessment Comments  Pt continues to make slow gains with AROM, however passive stretching seems to be better.   -KM        PT Plan    PT Plan Comments  Assess pts response to brace, Continue to push ROM  -KM       User Key  (r) = Recorded By, (t) = Taken By,  (c) = Cosigned By    Initials Name Provider Type    Mckayla Yu PTA Physical Therapy Assistant            OP Exercises     Row Name 11/27/19 0945             Subjective Comments    Subjective Comments  Pt states his knee feels stiff this morning and he will receive his brace Friday. F/ appointment scheduled next Tuesday  -KM         Exercise 1    Exercise Name 1  Heel Slides  -KM      Time 1  10 min  -KM         Exercise 2    Exercise Name 2  Wall Slides  -KM      Time 2  10 min  -KM         Exercise 3    Exercise Name 3  Bike seat 1  -KM      Time 3  8 min  -KM         Exercise 4    Exercise Name 4  Towel Slides in sitting for knee FLEX  -KM      Cueing 4  Verbal;Tactile  -KM      Time 4  5 min  -KM         Exercise 5    Exercise Name 5  Passive knee FLEX stretch in supine  -KM      Reps 5  10  -KM      Time 5  10 secs  -KM         Exercise 6    Exercise Name 6  Hamstring stretch  -KM      Reps 6  15  -KM      Time 6  10 secs  -KM         Exercise 7    Exercise Name 7  Supine knee EXT on roll  -KM      Time 7  5 min  -KM      Additional Comments  5#  -KM         Exercise 8    Exercise Name 8  Passive knee extension stretch  -KM      Reps 8  10  -KM      Time 8  10 secs  -KM        User Key  (r) = Recorded By, (t) = Taken By, (c) = Cosigned By    Initials Name Provider Type    Mckayla Yu PTA Physical Therapy Assistant                                          Time Calculation:   Start Time: 0945  Stop Time: 1050  Time Calculation (min): 65 min  Therapy Charges for Today     Code Description Service Date Service Provider Modifiers Qty    21850894340 HC PT THER PROC EA 15 MIN 11/27/2019 Mckayla Perdomo PTA GP 1                    Mckayla Perdomo PTA  11/27/2019

## 2019-12-03 ENCOUNTER — OFFICE VISIT (OUTPATIENT)
Dept: ORTHOPEDIC SURGERY | Facility: CLINIC | Age: 69
End: 2019-12-03

## 2019-12-03 ENCOUNTER — HOSPITAL ENCOUNTER (OUTPATIENT)
Dept: PHYSICAL THERAPY | Facility: HOSPITAL | Age: 69
Setting detail: THERAPIES SERIES
Discharge: HOME | End: 2019-12-03

## 2019-12-03 VITALS — BODY MASS INDEX: 37.52 KG/M2 | WEIGHT: 277 LBS | HEIGHT: 72 IN

## 2019-12-03 DIAGNOSIS — Z96.651 STATUS POST TOTAL RIGHT KNEE REPLACEMENT: ICD-10-CM

## 2019-12-03 DIAGNOSIS — Z96.651 STATUS POST TOTAL RIGHT KNEE REPLACEMENT: Primary | ICD-10-CM

## 2019-12-03 DIAGNOSIS — Z98.890 STATUS POST SURGICAL MANIPULATION OF KNEE JOINT: Primary | ICD-10-CM

## 2019-12-03 PROCEDURE — 99024 POSTOP FOLLOW-UP VISIT: CPT | Performed by: ORTHOPAEDIC SURGERY

## 2019-12-03 PROCEDURE — 97110 THERAPEUTIC EXERCISES: CPT

## 2019-12-03 PROCEDURE — 97140 MANUAL THERAPY 1/> REGIONS: CPT

## 2019-12-03 NOTE — PROGRESS NOTES
CC: Follow-up status post right knee manipulation under anesthesia with intra-articular injection DOS:11/11/19    Interval History: Patient returns to clinic today stating he is doing well. He will be receiving the dynasplint tomorrow. He denies any residual pain at this time, he denies any associated numbness and tingling.     Exam:  Right Knee-   Incision well-healed  ROM 0-105 degrees  Effusion- minimal   No opening on varus and valgus stress at 0 and 30  Log roll-  negative  Stinchfield-  negative  Positive sensation light tough all distributions symmetric to contralateral side  Brisk cap refill all digits  2+ dorsalis pedis pulse    Impression: Status post right knee manipulation under anesthesia with intra-articular injection    Plan:  1. Patient had all questions answered today and was in agreement with treatment plan.  2. He may return to activities as tolerated.   3. If he begins to experience irritation with the dynaspint we will consider prescribing a prednisone taper.  4. Follow-up in 4 weeks     By signing my name here, I Nathaly Driver, attest that all documentation on 12/03/19 at 7:53 AM has been prepared under the direction and in the presence of Dr. Lopez Mejias.    I, Dr. Lopez Mejias, personally performed the services described in this documentation, as scribed by Nathaly Driver, in my presence, and it is both accurate and complete.

## 2019-12-03 NOTE — THERAPY TREATMENT NOTE
Outpatient Physical Therapy Ortho Treatment Note   Linda Holm     Patient Name: Jefry Sabillon  : 1950  MRN: [id number]  Today's Date: 12/3/2019      Visit Date: 2019    Visit Dx:    ICD-10-CM ICD-9-CM   1. Status post total right knee replacement Z96.651 V43.65       Patient Active Problem List   Diagnosis   • Primary osteoarthritis of knees, bilateral   • Acute dyspnea   • Pericarditis   • Essential hypertension   • Mixed hyperlipidemia   • Primary osteoarthritis of right knee   • Status post surgical manipulation of knee joint        Past Medical History:   Diagnosis Date   • Arthritis    • Asthma    • Cardiac disease    • Coronary artery disease    • Diabetes mellitus (CMS/[facility])     take Metformin   • Heart disease    • Hypertension    • Knee sprain    • Mini stroke (CMS/[facility])    • EVE (obstructive sleep apnea)    • Rotator cuff syndrome         Past Surgical History:   Procedure Laterality Date   • HERNIA REPAIR     • JOINT MANIPULATION Right 2019    Procedure: KNEE MANIPULATION;  Surgeon: Lopez Mejias MD;  Location: [facility] OR;  Service: Orthopedics   • KNEE SURGERY     • TOTAL KNEE ARTHROPLASTY Right 2019    Procedure: TOTAL KNEE ARTHROPLASTY AND ALL ASSOCIATED PROCEDURES;  Surgeon: Lopez Mejias MD;  Location: [facility] OR;  Service: Orthopedics       PT Ortho     Row Name 19 0855       Right Lower Ext    Rt Knee Extension/Flexion AROM  0-106 AAROM post stretch  -KM      User Key  (r) = Recorded By, (t) = Taken By, (c) = Cosigned By    Initials Name Provider Type    Mckayla Yu PTA Physical Therapy Assistant                      PT Assessment/Plan     Row Name 19 0855          PT Assessment    Assessment Comments  Pt demonstrates improved passive motion, however active and AROM continues to be limited. Have decreased PT to 3x/week   -KM        PT Plan    PT Plan Comments  Continue POC per MD  -KM       User Key  (r) = Recorded By, (t) = Taken By,  (c) = Cosigned By    Initials Name Provider Type    Mckayla Yu PTA Physical Therapy Assistant            OP Exercises     Row Name 12/03/19 0855             Subjective Comments    Subjective Comments  Pt states his knee is doing well, he had returned to work last Friday and states everything went well.   -KM         Exercise 1    Exercise Name 1  Heel Slides  -KM      Time 1  10 min  -KM         Exercise 2    Exercise Name 2  Wall Slides  -KM      Time 2  10 min  -KM         Exercise 3    Exercise Name 3  Bike seat 1  -KM      Time 3  8 min  -KM         Exercise 4    Exercise Name 4  Towel Slides in sitting for knee FLEX  -KM      Cueing 4  Verbal;Tactile  -KM      Time 4  5 min  -KM         Exercise 5    Exercise Name 5  Passive knee FLEX stretch in supine  -KM      Reps 5  10  -KM      Time 5  10 secs  -KM         Exercise 6    Exercise Name 6  Hamstring stretch  -KM      Reps 6  15  -KM      Time 6  10 secs  -KM         Exercise 7    Exercise Name 7  Supine knee EXT on roll  -KM      Time 7  5 min  -KM         Exercise 8    Exercise Name 8  Passive knee extension stretch  -KM      Reps 8  10  -KM      Time 8  10 secs  -KM        User Key  (r) = Recorded By, (t) = Taken By, (c) = Cosigned By    Initials Name Provider Type    Mckayla Yu PTA Physical Therapy Assistant                                          Time Calculation:   Start Time: 0855  Stop Time: 0955  Time Calculation (min): 60 min  Therapy Charges for Today     Code Description Service Date Service Provider Modifiers Qty    22423038440 HC PT MANUAL THERAPY EA 15 MIN 12/3/2019 Mckayla Perdomo PTA GP 1    21390332356 HC PT THER PROC EA 15 MIN 12/3/2019 Mckayla Perdomo PTA GP 1                    Mckayla Perdomo PTA  12/3/2019

## 2019-12-04 ENCOUNTER — HOSPITAL ENCOUNTER (OUTPATIENT)
Dept: PHYSICAL THERAPY | Facility: HOSPITAL | Age: 69
Setting detail: THERAPIES SERIES
Discharge: HOME | End: 2019-12-04

## 2019-12-04 DIAGNOSIS — Z96.651 STATUS POST TOTAL RIGHT KNEE REPLACEMENT: Primary | ICD-10-CM

## 2019-12-04 PROCEDURE — 97140 MANUAL THERAPY 1/> REGIONS: CPT

## 2019-12-04 PROCEDURE — 97110 THERAPEUTIC EXERCISES: CPT

## 2019-12-04 RX ORDER — CLINDAMYCIN HYDROCHLORIDE 300 MG/1
CAPSULE ORAL
Qty: 2 CAPSULE | Refills: 0 | Status: SHIPPED | OUTPATIENT
Start: 2019-12-04 | End: 2021-06-28 | Stop reason: HOSPADM

## 2019-12-04 NOTE — THERAPY TREATMENT NOTE
Outpatient Physical Therapy Ortho Treatment Note   Linda Holm     Patient Name: Jefry Sabillon  : 1950  MRN: [id number]  Today's Date: 2019      Visit Date: 2019    Visit Dx:    ICD-10-CM ICD-9-CM   1. Status post total right knee replacement Z96.651 V43.65       Patient Active Problem List   Diagnosis   • Primary osteoarthritis of knees, bilateral   • Acute dyspnea   • Pericarditis   • Essential hypertension   • Mixed hyperlipidemia   • Primary osteoarthritis of right knee   • Status post total right knee replacement        Past Medical History:   Diagnosis Date   • Arthritis    • Asthma    • Cardiac disease    • Coronary artery disease    • Diabetes mellitus (CMS/[facility])     take Metformin   • Heart disease    • Hypertension    • Knee sprain    • Mini stroke (CMS/[facility])    • EVE (obstructive sleep apnea)    • Rotator cuff syndrome         Past Surgical History:   Procedure Laterality Date   • HERNIA REPAIR     • JOINT MANIPULATION Right 2019    Procedure: KNEE MANIPULATION;  Surgeon: Lopez Mejias MD;  Location: [facility] OR;  Service: Orthopedics   • KNEE SURGERY     • TOTAL KNEE ARTHROPLASTY Right 2019    Procedure: TOTAL KNEE ARTHROPLASTY AND ALL ASSOCIATED PROCEDURES;  Surgeon: Lopez Mejias MD;  Location: [facility] OR;  Service: Orthopedics       PT Ortho     Row Name 19 0855       Right Lower Ext    Rt Knee Extension/Flexion AROM  0-106 AAROM post stretch  -KM      User Key  (r) = Recorded By, (t) = Taken By, (c) = Cosigned By    Initials Name Provider Type    Mckayla Yu PTA Physical Therapy Assistant                      PT Assessment/Plan     Row Name 19 0945          PT Assessment    Assessment Comments  Pt demonstrates improved motion passive in flexion and extension. Pt reports he was able to increase motion on the bike  -KM        PT Plan    PT Plan Comments  Check status and response to dynasplint. Continue per POC  -KM       User Key  (r) =  Recorded By, (t) = Taken By, (c) = Cosigned By    Initials Name Provider Type    Mckayla Yu PTA Physical Therapy Assistant            OP Exercises     Row Name 12/04/19 0945             Subjective Comments    Subjective Comments  Pt states his f/u appointment went well and the MD seemed pleased with his progress. He is to receive the dynasplint today.   -KM         Exercise 1    Exercise Name 1  Heel Slides  -KM      Time 1  10 min  -KM         Exercise 2    Exercise Name 2  Wall Slides  -KM      Time 2  10 min  -KM         Exercise 3    Exercise Name 3  Bike seat 1  -KM      Time 3  8 min  -KM         Exercise 4    Exercise Name 4  Towel Slides in sitting for knee FLEX  -KM      Cueing 4  Verbal;Tactile  -KM      Time 4  5 min  -KM         Exercise 5    Exercise Name 5  Passive knee FLEX stretch in supine  -KM      Reps 5  10  -KM      Time 5  10 secs  -KM         Exercise 6    Exercise Name 6  Hamstring stretch  -KM      Reps 6  15  -KM      Time 6  10 secs  -KM         Exercise 7    Exercise Name 7  Supine knee EXT on roll  -KM      Time 7  5 min  -KM         Exercise 8    Exercise Name 8  Passive knee extension stretch  -KM      Reps 8  10  -KM      Time 8  10 secs  -KM        User Key  (r) = Recorded By, (t) = Taken By, (c) = Cosigned By    Initials Name Provider Type    Mckayla Yu PTA Physical Therapy Assistant                                          Time Calculation:   Start Time: 0945  Stop Time: 1040  Time Calculation (min): 55 min  Therapy Charges for Today     Code Description Service Date Service Provider Modifiers Qty    01902595915 HC PT MANUAL THERAPY EA 15 MIN 12/4/2019 Mckayla Perdomo PTA GP 1    26361723921 HC PT THER PROC EA 15 MIN 12/4/2019 Mckayla Perdomo PTA GP 1                    Mckayla Perdomo PTA  12/4/2019

## 2019-12-06 ENCOUNTER — HOSPITAL ENCOUNTER (OUTPATIENT)
Dept: PHYSICAL THERAPY | Facility: HOSPITAL | Age: 69
Setting detail: THERAPIES SERIES
Discharge: HOME | End: 2019-12-06

## 2019-12-06 DIAGNOSIS — Z96.651 STATUS POST TOTAL RIGHT KNEE REPLACEMENT: Primary | ICD-10-CM

## 2019-12-06 PROCEDURE — 97140 MANUAL THERAPY 1/> REGIONS: CPT

## 2019-12-06 PROCEDURE — 97110 THERAPEUTIC EXERCISES: CPT

## 2019-12-06 NOTE — THERAPY TREATMENT NOTE
Outpatient Physical Therapy Ortho Treatment Note   Linda Holm     Patient Name: Jefry Sabillon  : 1950  MRN: [id number]  Today's Date: 2019      Visit Date: 2019    Visit Dx:    ICD-10-CM ICD-9-CM   1. Status post total right knee replacement Z96.651 V43.65       Patient Active Problem List   Diagnosis   • Primary osteoarthritis of knees, bilateral   • Acute dyspnea   • Pericarditis   • Essential hypertension   • Mixed hyperlipidemia   • Primary osteoarthritis of right knee   • Status post total right knee replacement        Past Medical History:   Diagnosis Date   • Arthritis    • Asthma    • Cardiac disease    • Coronary artery disease    • Diabetes mellitus (CMS/[facility])     take Metformin   • Heart disease    • Hypertension    • Knee sprain    • Mini stroke (CMS/[facility])    • EVE (obstructive sleep apnea)    • Rotator cuff syndrome         Past Surgical History:   Procedure Laterality Date   • HERNIA REPAIR     • JOINT MANIPULATION Right 2019    Procedure: KNEE MANIPULATION;  Surgeon: Lopez Mejias MD;  Location: [facility];  Service: Orthopedics   • KNEE SURGERY     • TOTAL KNEE ARTHROPLASTY Right 2019    Procedure: TOTAL KNEE ARTHROPLASTY AND ALL ASSOCIATED PROCEDURES;  Surgeon: Lopez Mejias MD;  Location: [facility];  Service: Orthopedics                       PT Assessment/Plan     Row Name 19 8726          PT Assessment    Assessment Comments  Pt ambulates with improved gait demonstrating increased hip/knee flexion and heel strike on the right. Pt continues to show progressing with passive stretching.   -KM        PT Plan    PT Plan Comments  Pt to continue with HEP and use of dynasplint. Proceed with PT 3x/wekk  -KM       User Key  (r) = Recorded By, (t) = Taken By, (c) = Cosigned By    Initials Name Provider Type    Mckayla Yu PTA Physical Therapy Assistant            OP Exercises     Row Name 19 7245             Subjective Comments     Subjective Comments  Pt states he received his dynasplint and has been wearing it 3x/day for 30 minutes at a time. He states he continues to notice a difference with improved knee flexion. Pt states he was able to ascend the steps without use of the handrails, but still feels hesitant descending.   -KM         Exercise 1    Exercise Name 1  Heel Slides  -KM      Time 1  10 min  -KM         Exercise 2    Exercise Name 2  Wall Slides  -KM      Time 2  10 min  -KM         Exercise 3    Exercise Name 3  Bike seat 1  -KM      Time 3  8 min  -KM         Exercise 4    Exercise Name 4  Towel Slides in sitting for knee FLEX  -KM      Cueing 4  Verbal;Tactile  -KM      Time 4  5 min  -KM         Exercise 5    Exercise Name 5  Passive knee FLEX stretch in supine  -KM      Reps 5  10  -KM      Time 5  10 secs  -KM         Exercise 6    Exercise Name 6  Hamstring stretch  -KM      Reps 6  15  -KM      Time 6  10 secs  -KM         Exercise 7    Exercise Name 7  Supine knee EXT on roll  -KM      Time 7  5 min  -KM         Exercise 8    Exercise Name 8  Passive knee extension stretch  -KM      Reps 8  10  -KM      Time 8  10 secs  -KM        User Key  (r) = Recorded By, (t) = Taken By, (c) = Cosigned By    Initials Name Provider Type    Mckayla Yu PTA Physical Therapy Assistant                                          Time Calculation:   Start Time: 0730  Stop Time: 0837  Time Calculation (min): 67 min  Therapy Charges for Today     Code Description Service Date Service Provider Modifiers Qty    86219010615 HC PT MANUAL THERAPY EA 15 MIN 12/6/2019 Mckayla Perdomo PTA GP 1    25817610339 HC PT THER PROC EA 15 MIN 12/6/2019 Mckayla Perdomo PTA GP 1                    Mckayla Perdomo PTA  12/6/2019

## 2019-12-09 ENCOUNTER — HOSPITAL ENCOUNTER (OUTPATIENT)
Dept: PHYSICAL THERAPY | Facility: HOSPITAL | Age: 69
Setting detail: THERAPIES SERIES
Discharge: HOME | End: 2019-12-09

## 2019-12-09 DIAGNOSIS — Z96.651 STATUS POST TOTAL RIGHT KNEE REPLACEMENT: Primary | ICD-10-CM

## 2019-12-09 PROCEDURE — 97140 MANUAL THERAPY 1/> REGIONS: CPT

## 2019-12-09 NOTE — THERAPY TREATMENT NOTE
Outpatient Physical Therapy Ortho Treatment Note  FLORI Craven     Patient Name: Jefry Sabillon  : 1950  MRN: [id number]  Today's Date: 2019      Visit Date: 2019    Visit Dx:    ICD-10-CM ICD-9-CM   1. Status post total right knee replacement Z96.651 V43.65       Patient Active Problem List   Diagnosis   • Primary osteoarthritis of knees, bilateral   • Acute dyspnea   • Pericarditis   • Essential hypertension   • Mixed hyperlipidemia   • Primary osteoarthritis of right knee   • Status post total right knee replacement        Past Medical History:   Diagnosis Date   • Arthritis    • Asthma    • Cardiac disease    • Coronary artery disease    • Diabetes mellitus (CMS/[facility])     take Metformin   • Heart disease    • Hypertension    • Knee sprain    • Mini stroke (CMS/[facility])    • EVE (obstructive sleep apnea)    • Rotator cuff syndrome         Past Surgical History:   Procedure Laterality Date   • HERNIA REPAIR     • JOINT MANIPULATION Right 2019    Procedure: KNEE MANIPULATION;  Surgeon: Lopez Mejias MD;  Location: [facility] OR;  Service: Orthopedics   • KNEE SURGERY     • TOTAL KNEE ARTHROPLASTY Right 2019    Procedure: TOTAL KNEE ARTHROPLASTY AND ALL ASSOCIATED PROCEDURES;  Surgeon: Lopez Mejias MD;  Location: [facility] OR;  Service: Orthopedics                       PT Assessment/Plan     Row Name 19 6515          PT Assessment    Assessment Comments  Pt demonstrates some tightness with passive stretching in flexion. Pt continues to demonstrates improved overall function.   -KM        PT Plan    PT Plan Comments  Continue per POC  -KM       User Key  (r) = Recorded By, (t) = Taken By, (c) = Cosigned By    Initials Name Provider Type    Mckayla Yu, ANGELITO Physical Therapy Assistant            OP Exercises     Row Name 19 9540             Subjective Comments    Subjective Comments  Pt states his knee feels good and continues to notice improved motion with use  of the [employer].   -KM         Exercise 1    Exercise Name 1  Heel Slides  -KM      Time 1  10 min  -KM         Exercise 2    Exercise Name 2  Wall Slides  -KM      Time 2  10 min  -KM         Exercise 3    Exercise Name 3  Bike seat 1  -KM      Time 3  8 min  -KM         Exercise 4    Exercise Name 4  Towel Slides in sitting for knee FLEX  -KM      Cueing 4  Verbal;Tactile  -KM      Time 4  5 min  -KM         Exercise 5    Exercise Name 5  Passive knee FLEX stretch in supine  -KM      Reps 5  10  -KM      Time 5  10 secs  -KM         Exercise 6    Exercise Name 6  Hamstring stretch  -KM      Reps 6  15  -KM      Time 6  10 secs  -KM         Exercise 7    Exercise Name 7  Supine knee EXT on roll  -KM      Time 7  5 min  -KM      Additional Comments  5#  -KM         Exercise 8    Exercise Name 8  Passive knee extension stretch  -KM      Reps 8  10  -KM      Time 8  10 secs  -KM        User Key  (r) = Recorded By, (t) = Taken By, (c) = Cosigned By    Initials Name Provider Type     Mckayla Perdomo PTA Physical Therapy Assistant                                          Time Calculation:   Start Time: 0955  Stop Time: 1055  Time Calculation (min): 60 min  Therapy Charges for Today     Code Description Service Date Service Provider Modifiers Qty    74239956914 HC PT MANUAL THERAPY EA 15 MIN 12/9/2019 Mckayla Perdomo PTA GP 1                    Mckayla Perdomo PTA  12/9/2019

## 2019-12-11 ENCOUNTER — HOSPITAL ENCOUNTER (OUTPATIENT)
Dept: PHYSICAL THERAPY | Facility: HOSPITAL | Age: 69
Setting detail: THERAPIES SERIES
Discharge: HOME | End: 2019-12-11

## 2019-12-11 DIAGNOSIS — Z96.651 STATUS POST TOTAL RIGHT KNEE REPLACEMENT: Primary | ICD-10-CM

## 2019-12-11 PROCEDURE — 97110 THERAPEUTIC EXERCISES: CPT

## 2019-12-11 PROCEDURE — 97140 MANUAL THERAPY 1/> REGIONS: CPT

## 2019-12-11 NOTE — THERAPY TREATMENT NOTE
Outpatient Physical Therapy Ortho Treatment Note  FLORI Craven     Patient Name: Jefry Sabillon  : 1950  MRN: [id number]  Today's Date: 2019      Visit Date: 2019    Visit Dx:    ICD-10-CM ICD-9-CM   1. Status post total right knee replacement Z96.651 V43.65       Patient Active Problem List   Diagnosis   • Primary osteoarthritis of knees, bilateral   • Acute dyspnea   • Pericarditis   • Essential hypertension   • Mixed hyperlipidemia   • Primary osteoarthritis of right knee   • Status post total right knee replacement        Past Medical History:   Diagnosis Date   • Arthritis    • Asthma    • Cardiac disease    • Coronary artery disease    • Diabetes mellitus (CMS/[facility])     take Metformin   • Heart disease    • Hypertension    • Knee sprain    • Mini stroke (CMS/[facility])    • EVE (obstructive sleep apnea)    • Rotator cuff syndrome         Past Surgical History:   Procedure Laterality Date   • HERNIA REPAIR     • JOINT MANIPULATION Right 2019    Procedure: KNEE MANIPULATION;  Surgeon: Lopez Mejias MD;  Location: [facility] OR;  Service: Orthopedics   • KNEE SURGERY     • TOTAL KNEE ARTHROPLASTY Right 2019    Procedure: TOTAL KNEE ARTHROPLASTY AND ALL ASSOCIATED PROCEDURES;  Surgeon: Lopez Mejias MD;  Location: [facility] OR;  Service: Orthopedics       PT Ortho     Row Name 19 5302       Right Lower Ext    Rt Knee Extension/Flexion AROM  0-110 post stretch  -KM      User Key  (r) = Recorded By, (t) = Taken By, (c) = Cosigned By    Initials Name Provider Type    Mckayla Yu PTA Physical Therapy Assistant                      PT Assessment/Plan     Row Name 19 6098          PT Assessment    Assessment Comments  Pt demonstrates improved flexion ROM passively and actively. Pt measured to 110 degrees flexion post stretch.   -KM        PT Plan    PT Plan Comments  Continue to push ROM  -KM       User Key  (r) = Recorded By, (t) = Taken By, (c) = Cosigned By     Initials Name Provider Type    Mckayla Yu PTA Physical Therapy Assistant            OP Exercises     Row Name 12/11/19 0945             Subjective Comments    Subjective Comments  Pt reports no additional concerns today and his knee is doing well overall.   -KM         Exercise 1    Exercise Name 1  Heel Slides  -KM      Time 1  10 min  -KM         Exercise 2    Exercise Name 2  Wall Slides  -KM      Time 2  10 min  -KM         Exercise 3    Exercise Name 3  Bike seat 1  -KM      Time 3  8 min  -KM         Exercise 4    Exercise Name 4  Towel Slides in sitting for knee FLEX  -KM      Cueing 4  Verbal;Tactile  -KM      Time 4  5 min  -KM         Exercise 5    Exercise Name 5  Passive knee FLEX stretch in supine  -KM      Reps 5  10  -KM      Time 5  10 secs  -KM         Exercise 6    Exercise Name 6  Hamstring stretch  -KM      Reps 6  15  -KM      Time 6  10 secs  -KM         Exercise 7    Exercise Name 7  Supine knee EXT on roll  -KM      Time 7  5 min  -KM         Exercise 8    Exercise Name 8  Passive knee extension stretch  -KM      Reps 8  10  -KM      Time 8  10 secs  -KM        User Key  (r) = Recorded By, (t) = Taken By, (c) = Cosigned By    Initials Name Provider Type    Mckayla Yu PTA Physical Therapy Assistant                                          Time Calculation:   Start Time: 0945  Stop Time: 1045  Time Calculation (min): 60 min  Therapy Charges for Today     Code Description Service Date Service Provider Modifiers Qty    57024919046 HC PT MANUAL THERAPY EA 15 MIN 12/11/2019 Mckayla Perdomo PTA GP 1    26912208289 HC PT THER PROC EA 15 MIN 12/11/2019 Mckayla Perdomo PTA GP 1                    Mckayla Perdomo PTA  12/11/2019

## 2019-12-13 ENCOUNTER — HOSPITAL ENCOUNTER (OUTPATIENT)
Dept: PHYSICAL THERAPY | Facility: HOSPITAL | Age: 69
Setting detail: THERAPIES SERIES
Discharge: HOME | End: 2019-12-13

## 2019-12-13 DIAGNOSIS — Z96.651 STATUS POST TOTAL RIGHT KNEE REPLACEMENT: Primary | ICD-10-CM

## 2019-12-13 PROCEDURE — 97110 THERAPEUTIC EXERCISES: CPT

## 2019-12-13 PROCEDURE — 97140 MANUAL THERAPY 1/> REGIONS: CPT

## 2019-12-13 NOTE — THERAPY TREATMENT NOTE
Outpatient Physical Therapy Ortho Treatment Note   Linda Holm     Patient Name: Jefry Sabillon  : 1950  MRN: [id number]  Today's Date: 2019      Visit Date: 2019    Visit Dx:    ICD-10-CM ICD-9-CM   1. Status post total right knee replacement Z96.651 V43.65       Patient Active Problem List   Diagnosis   • Primary osteoarthritis of knees, bilateral   • Acute dyspnea   • Pericarditis   • Essential hypertension   • Mixed hyperlipidemia   • Primary osteoarthritis of right knee   • Status post total right knee replacement        Past Medical History:   Diagnosis Date   • Arthritis    • Asthma    • Cardiac disease    • Coronary artery disease    • Diabetes mellitus (CMS/[facility])     take Metformin   • Heart disease    • Hypertension    • Knee sprain    • Mini stroke (CMS/[facility])    • EVE (obstructive sleep apnea)    • Rotator cuff syndrome         Past Surgical History:   Procedure Laterality Date   • HERNIA REPAIR     • JOINT MANIPULATION Right 2019    Procedure: KNEE MANIPULATION;  Surgeon: Lopez Mejias MD;  Location: [facility] OR;  Service: Orthopedics   • KNEE SURGERY     • TOTAL KNEE ARTHROPLASTY Right 2019    Procedure: TOTAL KNEE ARTHROPLASTY AND ALL ASSOCIATED PROCEDURES;  Surgeon: Lopez Mejias MD;  Location: [facility] OR;  Service: Orthopedics                       PT Assessment/Plan     Row Name 19 9009          PT Assessment    Assessment Comments  Pt ambulates with improved gait technique with increased (R)hip/knee flexion and heel strike.  Minimal edema noted distally over ant tib. Pt continues to make small gains with passive and active ROM.   -KM        PT Plan    PT Plan Comments  Plan to continue PT 3x/day. Pt to continue with HEP and dynasplint.   -KM       User Key  (r) = Recorded By, (t) = Taken By, (c) = Cosigned By    Initials Name Provider Type    Mckayla Yu PTA Physical Therapy Assistant            OP Exercises     Row Name 19 0922              Subjective Comments    Subjective Comments  Pt states his knee is doing well, states he notices some additional swelling today.   -KM         Exercise 1    Exercise Name 1  Heel Slides  -KM      Time 1  10 min  -KM         Exercise 2    Exercise Name 2  Wall Slides  -KM      Time 2  10 min  -KM         Exercise 3    Exercise Name 3  Bike seat 1  -KM      Time 3  8 min  -KM         Exercise 4    Exercise Name 4  Towel Slides in sitting for knee FLEX  -KM      Cueing 4  Verbal;Tactile  -KM      Time 4  5 min  -KM         Exercise 5    Exercise Name 5  Passive knee FLEX stretch in supine  -KM      Reps 5  10  -KM      Time 5  10 secs  -KM         Exercise 6    Exercise Name 6  Hamstring stretch  -KM      Reps 6  15  -KM      Time 6  10 secs  -KM         Exercise 7    Exercise Name 7  Supine knee EXT on roll  -KM      Time 7  5 min  -KM      Additional Comments  5#  -KM         Exercise 8    Exercise Name 8  Passive knee extension stretch  -KM      Reps 8  10  -KM      Time 8  10 secs  -KM        User Key  (r) = Recorded By, (t) = Taken By, (c) = Cosigned By    Initials Name Provider Type    Mckayla Yu PTA Physical Therapy Assistant                                          Time Calculation:   Start Time: 0720  Stop Time: 0820  Time Calculation (min): 60 min  Therapy Charges for Today     Code Description Service Date Service Provider Modifiers Qty    77219636145 HC PT MANUAL THERAPY EA 15 MIN 12/13/2019 Mckayla Perdomo PTA GP 1    66986019898 HC PT THER PROC EA 15 MIN 12/13/2019 Mckayla Perdomo PTA GP 1                    Mckayla Perdomo PTA  12/13/2019

## 2019-12-16 ENCOUNTER — HOSPITAL ENCOUNTER (OUTPATIENT)
Dept: PHYSICAL THERAPY | Facility: HOSPITAL | Age: 69
Setting detail: THERAPIES SERIES
Discharge: HOME | End: 2019-12-16

## 2019-12-16 DIAGNOSIS — Z96.651 STATUS POST TOTAL RIGHT KNEE REPLACEMENT: Primary | ICD-10-CM

## 2019-12-16 PROCEDURE — 97140 MANUAL THERAPY 1/> REGIONS: CPT

## 2019-12-16 NOTE — THERAPY TREATMENT NOTE
Outpatient Physical Therapy Ortho Treatment Note   Linda Holm     Patient Name: Jefry Sabillon  : 1950  MRN: [id number]  Today's Date: 2019      Visit Date: 2019    Visit Dx:    ICD-10-CM ICD-9-CM   1. Status post total right knee replacement Z96.651 V43.65       Patient Active Problem List   Diagnosis   • Primary osteoarthritis of knees, bilateral   • Acute dyspnea   • Pericarditis   • Essential hypertension   • Mixed hyperlipidemia   • Primary osteoarthritis of right knee   • Status post total right knee replacement        Past Medical History:   Diagnosis Date   • Arthritis    • Asthma    • Cardiac disease    • Coronary artery disease    • Diabetes mellitus (CMS/[facility])     take Metformin   • Heart disease    • Hypertension    • Knee sprain    • Mini stroke (CMS/[facility])    • EVE (obstructive sleep apnea)    • Rotator cuff syndrome         Past Surgical History:   Procedure Laterality Date   • HERNIA REPAIR     • JOINT MANIPULATION Right 2019    Procedure: KNEE MANIPULATION;  Surgeon: Lopez Mejias MD;  Location: [facility];  Service: Orthopedics   • KNEE SURGERY     • TOTAL KNEE ARTHROPLASTY Right 2019    Procedure: TOTAL KNEE ARTHROPLASTY AND ALL ASSOCIATED PROCEDURES;  Surgeon: Lopez Mejias MD;  Location: [facility];  Service: Orthopedics                       PT Assessment/Plan     Row Name 19 1000          PT Assessment    Assessment Comments  Pt continues to do well with current treatment plan and demonstartes improved ROM primarily with passive stretching.   -KM        PT Plan    PT Plan Comments  Continue to push ROM  -KM       User Key  (r) = Recorded By, (t) = Taken By, (c) = Cosigned By    Initials Name Provider Type    Mckayla Yu PTA Physical Therapy Assistant            OP Exercises     Row Name 19 1000             Subjective Comments    Subjective Comments  Pt states his knee continues to do well overall and feels his knee is bending  further. He states he continues to experience the benefits of the dynasplint.   -KM         Exercise 1    Exercise Name 1  Heel Slides  -KM      Time 1  10 min  -KM         Exercise 2    Exercise Name 2  Wall Slides  -KM      Time 2  10 min  -KM         Exercise 3    Exercise Name 3  Bike seat 1  -KM      Time 3  8 min  -KM         Exercise 4    Exercise Name 4  Towel Slides in sitting for knee FLEX  -KM      Cueing 4  Verbal;Tactile  -KM      Time 4  5 min  -KM         Exercise 5    Exercise Name 5  Passive knee FLEX stretch in supine  -KM      Reps 5  10  -KM      Time 5  10 secs  -KM         Exercise 6    Exercise Name 6  Hamstring stretch  -KM      Reps 6  15  -KM      Time 6  10 secs  -KM         Exercise 7    Exercise Name 7  Supine knee EXT on roll  -KM      Time 7  5 min  -KM      Additional Comments  5#  -KM         Exercise 8    Exercise Name 8  Passive knee extension stretch  -KM      Reps 8  10  -KM      Time 8  10 secs  -KM        User Key  (r) = Recorded By, (t) = Taken By, (c) = Cosigned By    Initials Name Provider Type    Mckayla Yu PTA Physical Therapy Assistant                                          Time Calculation:   Start Time: 1000  Stop Time: 1104  Time Calculation (min): 64 min  Therapy Charges for Today     Code Description Service Date Service Provider Modifiers Qty    21947418229 HC PT MANUAL THERAPY EA 15 MIN 12/16/2019 Mckayla Perdomo PTA GP 1                    Mckayla Perdomo PTA  12/16/2019

## 2019-12-18 ENCOUNTER — HOSPITAL ENCOUNTER (OUTPATIENT)
Dept: PHYSICAL THERAPY | Facility: HOSPITAL | Age: 69
Setting detail: THERAPIES SERIES
Discharge: HOME | End: 2019-12-18

## 2019-12-18 DIAGNOSIS — Z96.651 STATUS POST TOTAL RIGHT KNEE REPLACEMENT: Primary | ICD-10-CM

## 2019-12-18 PROCEDURE — 97140 MANUAL THERAPY 1/> REGIONS: CPT

## 2019-12-18 PROCEDURE — 97110 THERAPEUTIC EXERCISES: CPT

## 2019-12-18 NOTE — THERAPY TREATMENT NOTE
Outpatient Physical Therapy Ortho Treatment Note  FLORI Craven     Patient Name: Jefry Sabillon  : 1950  MRN: [id number]  Today's Date: 2019      Visit Date: 2019    Visit Dx:    ICD-10-CM ICD-9-CM   1. Status post total right knee replacement Z96.651 V43.65       Patient Active Problem List   Diagnosis   • Primary osteoarthritis of knees, bilateral   • Acute dyspnea   • Pericarditis   • Essential hypertension   • Mixed hyperlipidemia   • Primary osteoarthritis of right knee   • Status post total right knee replacement        Past Medical History:   Diagnosis Date   • Arthritis    • Asthma    • Cardiac disease    • Coronary artery disease    • Diabetes mellitus (CMS/[facility])     take Metformin   • Heart disease    • Hypertension    • Knee sprain    • Mini stroke (CMS/[facility])    • EVE (obstructive sleep apnea)    • Rotator cuff syndrome         Past Surgical History:   Procedure Laterality Date   • HERNIA REPAIR     • JOINT MANIPULATION Right 2019    Procedure: KNEE MANIPULATION;  Surgeon: Lopez Mejias MD;  Location: [facility] OR;  Service: Orthopedics   • KNEE SURGERY     • TOTAL KNEE ARTHROPLASTY Right 2019    Procedure: TOTAL KNEE ARTHROPLASTY AND ALL ASSOCIATED PROCEDURES;  Surgeon: Lopez Mejias MD;  Location: [facility] OR;  Service: Orthopedics       PT Ortho     Row Name 19 09       Right Lower Ext    Rt Knee Extension/Flexion AROM  0-112 post stretch  -KM      User Key  (r) = Recorded By, (t) = Taken By, (c) = Cosigned By    Initials Name Provider Type    Mckayla Yu PTA Physical Therapy Assistant                      PT Assessment/Plan     Row Name 19 3584          PT Assessment    Assessment Comments  Pt demonstrates improved passive stretching since he has started using the dynasplint. AAROM measured to 112 degress flexion.   -KM        PT Plan    PT Plan Comments  Continue per POC  -KM       User Key  (r) = Recorded By, (t) = Taken By, (c) =  Cosigned By    Initials Name Provider Type    Mckayla Yu PTA Physical Therapy Assistant            OP Exercises     Row Name 12/18/19 0950             Subjective Comments    Subjective Comments  Pt states his knee is doing well this morning with no complaints. States he had increased the resistance on the dynasplint to get more of a stretch.   -KM         Exercise 1    Exercise Name 1  Heel Slides  -KM      Time 1  10 min  -KM         Exercise 2    Exercise Name 2  Wall Slides  -KM      Time 2  10 min  -KM         Exercise 3    Exercise Name 3  Bike seat 1  -KM      Time 3  8 min  -KM         Exercise 4    Exercise Name 4  Towel Slides in sitting for knee FLEX  -KM      Cueing 4  Verbal;Tactile  -KM      Time 4  5 min  -KM         Exercise 5    Exercise Name 5  Passive knee FLEX stretch in supine  -KM      Reps 5  10  -KM      Time 5  10 secs  -KM         Exercise 6    Exercise Name 6  Hamstring stretch  -KM      Reps 6  15  -KM      Time 6  10 secs  -KM         Exercise 7    Exercise Name 7  Supine knee EXT on roll  -KM      Time 7  5 min  -KM         Exercise 8    Exercise Name 8  Passive knee extension stretch  -KM      Reps 8  10  -KM      Time 8  10 secs  -KM        User Key  (r) = Recorded By, (t) = Taken By, (c) = Cosigned By    Initials Name Provider Type    Mckayla Yu PTA Physical Therapy Assistant                                          Time Calculation:   Start Time: 0950  Stop Time: 1047  Time Calculation (min): 57 min  Therapy Charges for Today     Code Description Service Date Service Provider Modifiers Qty    96529393043 HC PT MANUAL THERAPY EA 15 MIN 12/18/2019 Mckayla Perdomo PTA GP 1    59761021988 HC PT THER PROC EA 15 MIN 12/18/2019 Mckayla Perdomo PTA GP 1                    Mckayla Perdomo PTA  12/18/2019

## 2019-12-20 ENCOUNTER — HOSPITAL ENCOUNTER (OUTPATIENT)
Dept: PHYSICAL THERAPY | Facility: HOSPITAL | Age: 69
Setting detail: THERAPIES SERIES
Discharge: HOME | End: 2019-12-20

## 2019-12-20 DIAGNOSIS — Z96.651 STATUS POST TOTAL RIGHT KNEE REPLACEMENT: Primary | ICD-10-CM

## 2019-12-20 PROCEDURE — 97140 MANUAL THERAPY 1/> REGIONS: CPT

## 2019-12-20 NOTE — THERAPY TREATMENT NOTE
Outpatient Physical Therapy Ortho Treatment Note   Linda Holm     Patient Name: Jefry Sabillon  : 1950  MRN: [id number]  Today's Date: 2019      Visit Date: 2019    Visit Dx:    ICD-10-CM ICD-9-CM   1. Status post total right knee replacement Z96.651 V43.65       Patient Active Problem List   Diagnosis   • Primary osteoarthritis of knees, bilateral   • Acute dyspnea   • Pericarditis   • Essential hypertension   • Mixed hyperlipidemia   • Primary osteoarthritis of right knee   • Status post total right knee replacement        Past Medical History:   Diagnosis Date   • Arthritis    • Asthma    • Cardiac disease    • Coronary artery disease    • Diabetes mellitus (CMS/[facility])     take Metformin   • Heart disease    • Hypertension    • Knee sprain    • Mini stroke (CMS/[facility])    • EVE (obstructive sleep apnea)    • Rotator cuff syndrome         Past Surgical History:   Procedure Laterality Date   • HERNIA REPAIR     • JOINT MANIPULATION Right 2019    Procedure: KNEE MANIPULATION;  Surgeon: Lopez Mejias MD;  Location: [facility] OR;  Service: Orthopedics   • KNEE SURGERY     • TOTAL KNEE ARTHROPLASTY Right 2019    Procedure: TOTAL KNEE ARTHROPLASTY AND ALL ASSOCIATED PROCEDURES;  Surgeon: Lopez Mejias MD;  Location: [facility] OR;  Service: Orthopedics                       PT Assessment/Plan     Row Name 19 5579          PT Assessment    Assessment Comments  Pt demonstrates a little more tightness with passive stretching in flexion.    -KM        PT Plan    PT Plan Comments  Continue to push ROM, plan to see pt 2x next week  -KM       User Key  (r) = Recorded By, (t) = Taken By, (c) = Cosigned By    Initials Name Provider Type    Mckayla Yu PTA Physical Therapy Assistant            OP Exercises     Row Name 19 9949             Subjective Comments    Subjective Comments  Pt states his knee continues to feel good.   -KM         Exercise 1    Exercise Name 1   Heel Slides  -KM      Time 1  10 min  -KM         Exercise 2    Exercise Name 2  Wall Slides  -KM      Time 2  10 min  -KM         Exercise 3    Exercise Name 3  Bike seat 1  -KM      Time 3  8 min  -KM         Exercise 4    Exercise Name 4  Towel Slides in sitting for knee FLEX  -KM      Cueing 4  Verbal;Tactile  -KM      Time 4  5 min  -KM         Exercise 5    Exercise Name 5  Passive knee FLEX stretch in supine  -KM      Reps 5  10  -KM      Time 5  10 secs  -KM         Exercise 6    Exercise Name 6  Hamstring stretch  -KM      Reps 6  15  -KM      Time 6  10 secs  -KM         Exercise 7    Exercise Name 7  Supine knee EXT on roll  -KM      Time 7  5 min  -KM      Additional Comments  5#  -KM         Exercise 8    Exercise Name 8  Passive knee extension stretch  -KM      Reps 8  10  -KM      Time 8  10 secs  -KM        User Key  (r) = Recorded By, (t) = Taken By, (c) = Cosigned By    Initials Name Provider Type    Mckayla Yu PTA Physical Therapy Assistant                                          Time Calculation:   Start Time: 0730  Stop Time: 0830  Time Calculation (min): 60 min  Therapy Charges for Today     Code Description Service Date Service Provider Modifiers Qty    29132319472 HC PT MANUAL THERAPY EA 15 MIN 12/20/2019 Mckayla Perdomo PTA GP 1                    Mckayla Perdomo PTA  12/20/2019

## 2019-12-23 ENCOUNTER — HOSPITAL ENCOUNTER (OUTPATIENT)
Dept: PHYSICAL THERAPY | Facility: HOSPITAL | Age: 69
Setting detail: THERAPIES SERIES
Discharge: HOME | End: 2019-12-23

## 2019-12-23 DIAGNOSIS — Z96.651 STATUS POST TOTAL RIGHT KNEE REPLACEMENT: Primary | ICD-10-CM

## 2019-12-23 PROCEDURE — 97110 THERAPEUTIC EXERCISES: CPT

## 2019-12-23 NOTE — THERAPY TREATMENT NOTE
Outpatient Physical Therapy Ortho Treatment Note   Linda Holm     Patient Name: Jefry Sabillon  : 1950  MRN: [id number]  Today's Date: 2019      Visit Date: 2019    Visit Dx:    ICD-10-CM ICD-9-CM   1. Status post total right knee replacement Z96.651 V43.65       Patient Active Problem List   Diagnosis   • Primary osteoarthritis of knees, bilateral   • Acute dyspnea   • Pericarditis   • Essential hypertension   • Mixed hyperlipidemia   • Primary osteoarthritis of right knee   • Status post total right knee replacement        Past Medical History:   Diagnosis Date   • Arthritis    • Asthma    • Cardiac disease    • Coronary artery disease    • Diabetes mellitus (CMS/[facility])     take Metformin   • Heart disease    • Hypertension    • Knee sprain    • Mini stroke (CMS/[facility])    • EVE (obstructive sleep apnea)    • Rotator cuff syndrome         Past Surgical History:   Procedure Laterality Date   • HERNIA REPAIR     • JOINT MANIPULATION Right 2019    Procedure: KNEE MANIPULATION;  Surgeon: Lopez Mejias MD;  Location: [facility] OR;  Service: Orthopedics   • KNEE SURGERY     • TOTAL KNEE ARTHROPLASTY Right 2019    Procedure: TOTAL KNEE ARTHROPLASTY AND ALL ASSOCIATED PROCEDURES;  Surgeon: Lopez Mejias MD;  Location: [facility] OR;  Service: Orthopedics                       PT Assessment/Plan     Row Name 19 5998          PT Assessment    Assessment Comments  Pt demonstrates improved gait and improved knee ROM with sit<>stand transfers. Pt continues to complete treatment plan well, tightness noted with passive stretching in flexion.   -KM        PT Plan    PT Plan Comments  Continue per POC  -KM       User Key  (r) = Recorded By, (t) = Taken By, (c) = Cosigned By    Initials Name Provider Type    Mckayla Yu PTA Physical Therapy Assistant            OP Exercises     Row Name 19 9406             Subjective Comments    Subjective Comments  Pt states his knee is  doing well with no additional complaints today.   -KM         Exercise 1    Exercise Name 1  Heel Slides  -KM      Time 1  10 min  -KM         Exercise 2    Exercise Name 2  Wall Slides  -KM      Time 2  10 min  -KM         Exercise 3    Exercise Name 3  Bike seat 1  -KM      Time 3  8 min  -KM         Exercise 4    Exercise Name 4  Towel Slides in sitting for knee FLEX  -KM      Cueing 4  Verbal;Tactile  -KM      Time 4  5 min  -KM         Exercise 5    Exercise Name 5  Passive knee FLEX stretch in supine  -KM      Reps 5  10  -KM      Time 5  10 secs  -KM         Exercise 6    Exercise Name 6  Hamstring stretch  -KM      Reps 6  15  -KM      Time 6  10 secs  -KM         Exercise 7    Exercise Name 7  Supine knee EXT on roll  -KM      Time 7  5 min  -KM      Additional Comments  5#  -KM         Exercise 8    Exercise Name 8  Passive knee extension stretch  -KM      Reps 8  10  -KM      Time 8  10 secs  -KM        User Key  (r) = Recorded By, (t) = Taken By, (c) = Cosigned By    Initials Name Provider Type     Mckayla Perdomo PTA Physical Therapy Assistant                                          Time Calculation:   Start Time: 0847  Stop Time: 0944  Time Calculation (min): 57 min  Therapy Charges for Today     Code Description Service Date Service Provider Modifiers Qty    17001603627 HC PT THER PROC EA 15 MIN 12/23/2019 Mckayla Perdomo PTA GP 1                    Mckayla Perdomo PTA  12/23/2019

## 2019-12-27 ENCOUNTER — HOSPITAL ENCOUNTER (OUTPATIENT)
Dept: PHYSICAL THERAPY | Facility: HOSPITAL | Age: 69
Setting detail: THERAPIES SERIES
Discharge: HOME | End: 2019-12-27

## 2019-12-27 DIAGNOSIS — Z96.651 STATUS POST TOTAL RIGHT KNEE REPLACEMENT: Primary | ICD-10-CM

## 2019-12-27 PROCEDURE — 97110 THERAPEUTIC EXERCISES: CPT

## 2019-12-27 NOTE — THERAPY TREATMENT NOTE
Outpatient Physical Therapy Ortho Treatment Note   Linda Holm     Patient Name: Jfery Sabillon  : 1950  MRN: [id number]  Today's Date: 2019      Visit Date: 2019    Visit Dx:    ICD-10-CM ICD-9-CM   1. Status post total right knee replacement Z96.651 V43.65       Patient Active Problem List   Diagnosis   • Primary osteoarthritis of knees, bilateral   • Acute dyspnea   • Pericarditis   • Essential hypertension   • Mixed hyperlipidemia   • Primary osteoarthritis of right knee   • Status post total right knee replacement        Past Medical History:   Diagnosis Date   • Arthritis    • Asthma    • Cardiac disease    • Coronary artery disease    • Diabetes mellitus (CMS/[facility])     take Metformin   • Heart disease    • Hypertension    • Knee sprain    • Mini stroke (CMS/[facility])    • EVE (obstructive sleep apnea)    • Rotator cuff syndrome         Past Surgical History:   Procedure Laterality Date   • HERNIA REPAIR     • JOINT MANIPULATION Right 2019    Procedure: KNEE MANIPULATION;  Surgeon: Lopez Mejias MD;  Location: [facility] OR;  Service: Orthopedics   • KNEE SURGERY     • TOTAL KNEE ARTHROPLASTY Right 2019    Procedure: TOTAL KNEE ARTHROPLASTY AND ALL ASSOCIATED PROCEDURES;  Surgeon: Lopez Mejias MD;  Location: [facility] OR;  Service: Orthopedics                       PT Assessment/Plan     Row Name 19 0725          PT Assessment    Assessment Comments  Pt continues to tolerate current treament plan well, increased tightness noted with passive stretching in flexion.   -KM        PT Plan    PT Plan Comments  Pt to complete HEP daily.   -KM       User Key  (r) = Recorded By, (t) = Taken By, (c) = Cosigned By    Initials Name Provider Type    Mckayla Yu PTA Physical Therapy Assistant            OP Exercises     Row Name 19 0725             Subjective Comments    Subjective Comments  Pt states his knee feels pretty good  -KM         Exercise 1    Exercise  Name 1  Heel Slides  -KM      Time 1  10 min  -KM         Exercise 2    Exercise Name 2  Wall Slides  -KM      Time 2  10 min  -KM         Exercise 3    Exercise Name 3  Bike seat 1  -KM      Time 3  8 min  -KM         Exercise 4    Exercise Name 4  Towel Slides in sitting for knee FLEX  -KM      Cueing 4  Verbal;Tactile  -KM      Time 4  5 min  -KM         Exercise 5    Exercise Name 5  Passive knee FLEX stretch in supine  -KM      Reps 5  10  -KM      Time 5  10 secs  -KM         Exercise 6    Exercise Name 6  Hamstring stretch  -KM      Reps 6  15  -KM      Time 6  10 secs  -KM         Exercise 7    Exercise Name 7  Supine knee EXT on roll  -KM      Time 7  5 min  -KM      Additional Comments  5#  -KM         Exercise 8    Exercise Name 8  Passive knee extension stretch  -KM      Reps 8  10  -KM      Time 8  10 secs  -KM        User Key  (r) = Recorded By, (t) = Taken By, (c) = Cosigned By    Initials Name Provider Type    Mckayla Yu PTA Physical Therapy Assistant                                          Time Calculation:   Start Time: 0725  Stop Time: 0818  Time Calculation (min): 53 min  Therapy Charges for Today     Code Description Service Date Service Provider Modifiers Qty    07421832682  PT THER PROC EA 15 MIN 12/27/2019 Mckayla Perdomo PTA GP 1                    Mckayla Perdomo PTA  12/27/2019

## 2019-12-30 ENCOUNTER — HOSPITAL ENCOUNTER (OUTPATIENT)
Dept: PHYSICAL THERAPY | Facility: HOSPITAL | Age: 69
Setting detail: THERAPIES SERIES
Discharge: HOME | End: 2019-12-30

## 2019-12-30 DIAGNOSIS — Z96.651 STATUS POST TOTAL RIGHT KNEE REPLACEMENT: Primary | ICD-10-CM

## 2019-12-30 PROCEDURE — 97110 THERAPEUTIC EXERCISES: CPT

## 2019-12-30 NOTE — THERAPY TREATMENT NOTE
Outpatient Physical Therapy Ortho Treatment Note  FLORI Craven     Patient Name: Jefry Sabillon  : 1950  MRN: [id number]  Today's Date: 2019      Visit Date: 2019    Visit Dx:    ICD-10-CM ICD-9-CM   1. Status post total right knee replacement Z96.651 V43.65       Patient Active Problem List   Diagnosis   • Primary osteoarthritis of knees, bilateral   • Acute dyspnea   • Pericarditis   • Essential hypertension   • Mixed hyperlipidemia   • Primary osteoarthritis of right knee   • Status post total right knee replacement        Past Medical History:   Diagnosis Date   • Arthritis    • Asthma    • Cardiac disease    • Coronary artery disease    • Diabetes mellitus (CMS/[facility])     take Metformin   • Heart disease    • Hypertension    • Knee sprain    • Mini stroke (CMS/[facility])    • EVE (obstructive sleep apnea)    • Rotator cuff syndrome         Past Surgical History:   Procedure Laterality Date   • HERNIA REPAIR     • JOINT MANIPULATION Right 2019    Procedure: KNEE MANIPULATION;  Surgeon: Lopez Mejias MD;  Location: [facility] OR;  Service: Orthopedics   • KNEE SURGERY     • TOTAL KNEE ARTHROPLASTY Right 2019    Procedure: TOTAL KNEE ARTHROPLASTY AND ALL ASSOCIATED PROCEDURES;  Surgeon: Lopez Mejias MD;  Location: [facility] OR;  Service: Orthopedics       PT Ortho     Row Name 19 4531       Right Lower Ext    Rt Knee Extension/Flexion AROM  0-111 measurements taken at end of treatment session.   -KM      User Key  (r) = Recorded By, (t) = Taken By, (c) = Cosigned By    Initials Name Provider Type    Mckayla Yu PTA Physical Therapy Assistant                      PT Assessment/Plan     Row Name 19 1977          PT Assessment    Assessment Comments  Pt demonstrates increased tightness with passive stretching in flexion. Limited gains have been made with AROM. Extension/Flexion measurements were taken a completion of session.   -KM        PT Plan    PT Plan  Comments  Continue POC per MD  -KM       User Key  (r) = Recorded By, (t) = Taken By, (c) = Cosigned By    Initials Name Provider Type    Mckayla Yu PTA Physical Therapy Assistant            OP Exercises     Row Name 12/30/19 0940             Subjective Comments    Subjective Comments  Pt reports  no additional concerns at this time, his knee is doing well and he has a f/u appointment Thursday.   -KM         Exercise 1    Exercise Name 1  Heel Slides  -KM      Time 1  10 min  -KM         Exercise 2    Exercise Name 2  Wall Slides  -KM      Time 2  10 min  -KM         Exercise 3    Exercise Name 3  Bike seat 1  -KM      Time 3  8 min  -KM         Exercise 4    Exercise Name 4  Towel Slides in sitting for knee FLEX  -KM      Cueing 4  Verbal;Tactile  -KM      Time 4  5 min  -KM         Exercise 5    Exercise Name 5  Passive knee FLEX stretch in supine  -KM      Reps 5  10  -KM      Time 5  10 secs  -KM         Exercise 6    Exercise Name 6  Hamstring stretch  -KM      Reps 6  15  -KM      Time 6  10 secs  -KM         Exercise 7    Exercise Name 7  Supine knee EXT on roll  -KM      Time 7  5 min  -KM         Exercise 8    Exercise Name 8  Passive knee extension stretch  -KM      Reps 8  10  -KM      Time 8  10 secs  -KM        User Key  (r) = Recorded By, (t) = Taken By, (c) = Cosigned By    Initials Name Provider Type    Mckayla Yu PTA Physical Therapy Assistant                                          Time Calculation:   Start Time: 0940  Stop Time: 1045  Time Calculation (min): 65 min  Therapy Charges for Today     Code Description Service Date Service Provider Modifiers Qty    74243270350 HC PT THER PROC EA 15 MIN 12/30/2019 Mckayla Perdomo PTA GP 1                    Mckayla Perdomo PTA  12/30/2019

## 2020-01-02 ENCOUNTER — OFFICE VISIT (OUTPATIENT)
Dept: ORTHOPEDIC SURGERY | Facility: CLINIC | Age: 70
End: 2020-01-02

## 2020-01-02 DIAGNOSIS — Z98.890 STATUS POST SURGICAL MANIPULATION OF KNEE JOINT: ICD-10-CM

## 2020-01-02 DIAGNOSIS — Z96.651 STATUS POST TOTAL RIGHT KNEE REPLACEMENT: Primary | ICD-10-CM

## 2020-01-02 PROCEDURE — 99212 OFFICE O/P EST SF 10 MIN: CPT | Performed by: ORTHOPAEDIC SURGERY

## 2020-01-02 NOTE — PROGRESS NOTES
Subjective:     Patient ID: Jefry Sabillon is a 69 y.o. male.    Chief Complaint:  Follow-up status post right knee manipulation under anesthesia with intra-articular injection DOS:11/11/19  Status post total knee arthroplasty, 9/4/2019     History of Present Illness  Jefry Sabillon returns to clinic today for evaluation of his right knee. He continues to work with physical therapy and has noted improvement of his range of motion. He has not worn the tyler-splint for the past week due to pain and discomfort while wearing it. He continues to use a stationary bike. He denies any significant residual pain at this time.   He denies associated numbness or tingling.     Social History     Occupational History   • Not on file   Tobacco Use   • Smoking status: Never Smoker   • Smokeless tobacco: Never Used   • Tobacco comment: caffeine use   Substance and Sexual Activity   • Alcohol use: Yes     Comment: occasional. monthly   • Drug use: No   • Sexual activity: Defer      Past Medical History:   Diagnosis Date   • Arthritis    • Asthma    • Cardiac disease    • Coronary artery disease    • Diabetes mellitus (CMS/[facility])     take Metformin   • Heart disease    • Hypertension    • Knee sprain    • Mini stroke (CMS/[facility])    • EVE (obstructive sleep apnea)    • Rotator cuff syndrome      Past Surgical History:   Procedure Laterality Date   • HERNIA REPAIR  2007   • JOINT MANIPULATION Right 11/11/2019    Procedure: KNEE MANIPULATION;  Surgeon: Lopez Mejias MD;  Location: [facility] OR;  Service: Orthopedics   • KNEE SURGERY  1970   • TOTAL KNEE ARTHROPLASTY Right 9/4/2019    Procedure: TOTAL KNEE ARTHROPLASTY AND ALL ASSOCIATED PROCEDURES;  Surgeon: Lopez Mejias MD;  Location: [facility] OR;  Service: Orthopedics       Family History   Problem Relation Age of Onset   • Hypertension Father    • Heart disease Father    • Stroke Father          Review of Systems   Constitutional: Negative for chills, diaphoresis, fever and  unexpected weight change.   HENT: Negative for hearing loss, nosebleeds, sore throat and tinnitus.    Eyes: Negative for pain and visual disturbance.   Respiratory: Negative for cough, shortness of breath and wheezing.    Cardiovascular: Negative for chest pain and palpitations.   Gastrointestinal: Negative for abdominal pain, diarrhea, nausea and vomiting.   Endocrine: Negative for cold intolerance, heat intolerance and polydipsia.   Genitourinary: Negative for difficulty urinating, dysuria and hematuria.   Musculoskeletal: Negative for arthralgias, joint swelling and myalgias.   Skin: Negative for rash and wound.   Allergic/Immunologic: Negative for environmental allergies.   Neurological: Negative for dizziness, syncope and numbness.   Hematological: Does not bruise/bleed easily.   Psychiatric/Behavioral: Negative for dysphoric mood and sleep disturbance. The patient is not nervous/anxious.            Objective:  There were no vitals filed for this visit.  There were no vitals filed for this visit.  There is no height or weight on file to calculate BMI.  General: No acute distress.  Resp: normal respiratory effort  Skin: no rashes or wounds; normal turgor  Psych: mood and affect appropriate; recent and remote memory intact      Ortho Exam     Right Knee-   Incision well-healed  ROM 0-115 degrees  Mild soft tissue swelling  Effusion- mild   Log roll-  negative  Stinchfield-  negative  Positive sensation light tough all distributions symmetric to contralateral side  Brisk cap refill all digits  2+ dorsalis pedis pulse    Imaging:  none  Assessment:        1. Status post total right knee replacement- 9/4/2019    2. Status post surgical manipulation of knee joint- 11/11/2019           Plan:          1. Discussed treatment options at length with patient at today's visit.   2. Advised patient to continue at-home exercise program for improvement in strength, range of motion and function with daily activities.  He will  contact the vendor for his Dynasplint to look for modifications given some increased pain recently with not as much improvement in regards to his motion.  He did note some good improvement with use of the Dynasplint over the first 1 to 2 weeks of its use.  3. Follow-up in 2 months with repeat x-ray of the right knee.       Jefry Sabillon  was in agreement with plan and had all questions answered.     Orders:  No orders of the defined types were placed in this encounter.      Medications:  No orders of the defined types were placed in this encounter.      Followup:  Return in about 2 months (around 3/2/2020) for xrays needed at follow up.    Jefry was seen today for follow-up.    Diagnoses and all orders for this visit:    Status post total right knee replacement- 9/4/2019    Status post surgical manipulation of knee joint- 11/11/2019      By signing my name here, I Nathaly Driver, attest that all documentation on 01/06/20 at 2:59 PM has been prepared under the direction and in the presence of Dr. Lopez Mejias.    I, Dr. Lopez Mejias, personally performed the services described in this documentation, as scribed by Nathaly Driver, in my presence, and it is both accurate and complete.      Dictated utilizing Dragon dictation

## 2020-01-03 ENCOUNTER — HOSPITAL ENCOUNTER (OUTPATIENT)
Dept: PHYSICAL THERAPY | Facility: HOSPITAL | Age: 70
Setting detail: THERAPIES SERIES
Discharge: HOME | End: 2020-01-03

## 2020-01-03 DIAGNOSIS — Z96.651 STATUS POST TOTAL RIGHT KNEE REPLACEMENT: Primary | ICD-10-CM

## 2020-01-03 PROCEDURE — 97110 THERAPEUTIC EXERCISES: CPT

## 2020-01-03 NOTE — THERAPY TREATMENT NOTE
Outpatient Physical Therapy Ortho Treatment Note   Linda Holm     Patient Name: Jefry Sabillon  : 1950  MRN: [id number]  Today's Date: 1/3/2020      Visit Date: 2020    Visit Dx:    ICD-10-CM ICD-9-CM   1. Status post total right knee replacement Z96.651 V43.65       Patient Active Problem List   Diagnosis   • Primary osteoarthritis of knees, bilateral   • Acute dyspnea   • Pericarditis   • Essential hypertension   • Mixed hyperlipidemia   • Primary osteoarthritis of right knee   • Status post total right knee replacement        Past Medical History:   Diagnosis Date   • Arthritis    • Asthma    • Cardiac disease    • Coronary artery disease    • Diabetes mellitus (CMS/[facility])     take Metformin   • Heart disease    • Hypertension    • Knee sprain    • Mini stroke (CMS/[facility])    • EVE (obstructive sleep apnea)    • Rotator cuff syndrome         Past Surgical History:   Procedure Laterality Date   • HERNIA REPAIR     • JOINT MANIPULATION Right 2019    Procedure: KNEE MANIPULATION;  Surgeon: Lopez Mejias MD;  Location: [facility] OR;  Service: Orthopedics   • KNEE SURGERY     • TOTAL KNEE ARTHROPLASTY Right 2019    Procedure: TOTAL KNEE ARTHROPLASTY AND ALL ASSOCIATED PROCEDURES;  Surgeon: Lopez Mejias MD;  Location: [facility];  Service: Orthopedics                       PT Assessment/Plan     Row Name 20 1984          PT Assessment    Assessment Comments  Slight improvement with passive stretching noted. Improved  flexion AROM noted with sit<>stands to/from chair.   -KM        PT Plan    PT Plan Comments  Plan to decreased PT frequency to 2x/week. Pt to continue with HEP.   -KM       User Key  (r) = Recorded By, (t) = Taken By, (c) = Cosigned By    Initials Name Provider Type    Mckayla Yu PTA Physical Therapy Assistant            OP Exercises     Row Name 20 7296             Subjective Comments    Subjective Comments  Pt states his f/u appointment went  well. He is to discontinue use of dynasplint at this time, continue with HEP and return for f/u in 2 months.   -KM         Exercise 1    Exercise Name 1  Heel Slides  -KM      Time 1  10 min  -KM         Exercise 2    Exercise Name 2  Wall Slides  -KM      Time 2  10 min  -KM         Exercise 3    Exercise Name 3  Bike seat 1  -KM      Time 3  8 min  -KM         Exercise 4    Exercise Name 4  Towel Slides in sitting for knee FLEX  -KM      Cueing 4  Verbal;Tactile  -KM      Time 4  5 min  -KM         Exercise 5    Exercise Name 5  Passive knee FLEX stretch in supine  -KM      Reps 5  10  -KM      Time 5  10 secs  -KM         Exercise 6    Exercise Name 6  Hamstring stretch  -KM      Reps 6  15  -KM      Time 6  10 secs  -KM         Exercise 7    Exercise Name 7  Supine knee EXT on roll  -KM      Time 7  5 min  -KM         Exercise 8    Exercise Name 8  Passive knee extension stretch  -KM      Reps 8  10  -KM      Time 8  10 secs  -KM        User Key  (r) = Recorded By, (t) = Taken By, (c) = Cosigned By    Initials Name Provider Type    Mckayla Yu PTA Physical Therapy Assistant                                          Time Calculation:   Start Time: 0730  Stop Time: 0820  Time Calculation (min): 50 min  Therapy Charges for Today     Code Description Service Date Service Provider Modifiers Qty    17527355555 HC PT THER PROC EA 15 MIN 1/3/2020 Mckayla Perdomo PTA GP 1                    Mckayla Perdomo PTA  1/3/2020

## 2020-01-07 ENCOUNTER — HOSPITAL ENCOUNTER (OUTPATIENT)
Dept: PHYSICAL THERAPY | Facility: HOSPITAL | Age: 70
Setting detail: THERAPIES SERIES
Discharge: HOME | End: 2020-01-07

## 2020-01-07 DIAGNOSIS — Z96.651 STATUS POST TOTAL RIGHT KNEE REPLACEMENT: Primary | ICD-10-CM

## 2020-01-07 PROCEDURE — 97110 THERAPEUTIC EXERCISES: CPT

## 2020-01-07 NOTE — THERAPY TREATMENT NOTE
Outpatient Physical Therapy Ortho Treatment Note   Linda Holm     Patient Name: Jefry Sabillon  : 1950  MRN: [id number]  Today's Date: 2020      Visit Date: 2020    Visit Dx:    ICD-10-CM ICD-9-CM   1. Status post total right knee replacement Z96.651 V43.65       Patient Active Problem List   Diagnosis   • Primary osteoarthritis of knees, bilateral   • Acute dyspnea   • Pericarditis   • Essential hypertension   • Mixed hyperlipidemia   • Primary osteoarthritis of right knee   • Status post total right knee replacement        Past Medical History:   Diagnosis Date   • Arthritis    • Asthma    • Cardiac disease    • Coronary artery disease    • Diabetes mellitus (CMS/[facility])     take Metformin   • Heart disease    • Hypertension    • Knee sprain    • Mini stroke (CMS/[facility])    • EVE (obstructive sleep apnea)    • Rotator cuff syndrome         Past Surgical History:   Procedure Laterality Date   • HERNIA REPAIR     • JOINT MANIPULATION Right 2019    Procedure: KNEE MANIPULATION;  Surgeon: Lopez Mejias MD;  Location: [facility] OR;  Service: Orthopedics   • KNEE SURGERY     • TOTAL KNEE ARTHROPLASTY Right 2019    Procedure: TOTAL KNEE ARTHROPLASTY AND ALL ASSOCIATED PROCEDURES;  Surgeon: Lopez Mejias MD;  Location: [facility] OR;  Service: Orthopedics                       PT Assessment/Plan     Row Name 20 0825          PT Assessment    Assessment Comments  Improved motion with passive stretching noted.   -KM        PT Plan    PT Plan Comments  Continue to push ROM  -KM       User Key  (r) = Recorded By, (t) = Taken By, (c) = Cosigned By    Initials Name Provider Type    Mckayla Yu PTA Physical Therapy Assistant            OP Exercises     Row Name 20 9745             Subjective Comments    Subjective Comments  Pt states his knee is a little sore, states he did additional activities around the house. State he had no issues including pushing off the (R) LE.    -KM         Exercise 1    Exercise Name 1  Heel Slides  -KM      Time 1  10 min  -KM         Exercise 2    Exercise Name 2  Wall Slides  -KM      Time 2  10 min  -KM         Exercise 3    Exercise Name 3  Bike seat 1  -KM      Time 3  8 min  -KM         Exercise 4    Exercise Name 4  Towel Slides in sitting for knee FLEX  -KM      Cueing 4  Verbal;Tactile  -KM      Time 4  5 min  -KM         Exercise 5    Exercise Name 5  Passive knee FLEX stretch in supine  -KM      Reps 5  10  -KM      Time 5  10 secs  -KM         Exercise 6    Exercise Name 6  Hamstring stretch  -KM      Reps 6  15  -KM      Time 6  10 secs  -KM         Exercise 7    Exercise Name 7  Supine knee EXT on roll  -KM      Time 7  5 min  -KM         Exercise 8    Exercise Name 8  Passive knee extension stretch  -KM      Reps 8  10  -KM      Time 8  10 secs  -KM        User Key  (r) = Recorded By, (t) = Taken By, (c) = Cosigned By    Initials Name Provider Type    Mckayla Yu PTA Physical Therapy Assistant                                          Time Calculation:   Start Time: 0850  Stop Time: 0945  Time Calculation (min): 55 min  Therapy Charges for Today     Code Description Service Date Service Provider Modifiers Qty    09962493669  PT THER PROC EA 15 MIN 1/7/2020 Mckayla Perdomo PTA GP 1                    Mckayla Perdomo PTA  1/7/2020

## 2020-01-10 ENCOUNTER — HOSPITAL ENCOUNTER (OUTPATIENT)
Dept: PHYSICAL THERAPY | Facility: HOSPITAL | Age: 70
Setting detail: THERAPIES SERIES
Discharge: HOME | End: 2020-01-10

## 2020-01-10 DIAGNOSIS — Z96.651 STATUS POST TOTAL RIGHT KNEE REPLACEMENT: Primary | ICD-10-CM

## 2020-01-10 PROCEDURE — 97110 THERAPEUTIC EXERCISES: CPT

## 2020-01-10 NOTE — THERAPY TREATMENT NOTE
Outpatient Physical Therapy Ortho Treatment Note   Linda Holm     Patient Name: Jefry Sabillon  : 1950  MRN: [id number]  Today's Date: 1/10/2020      Visit Date: 01/10/2020    Visit Dx:    ICD-10-CM ICD-9-CM   1. Status post total right knee replacement Z96.651 V43.65       Patient Active Problem List   Diagnosis   • Primary osteoarthritis of knees, bilateral   • Acute dyspnea   • Pericarditis   • Essential hypertension   • Mixed hyperlipidemia   • Primary osteoarthritis of right knee   • Status post total right knee replacement        Past Medical History:   Diagnosis Date   • Arthritis    • Asthma    • Cardiac disease    • Coronary artery disease    • Diabetes mellitus (CMS/[facility])     take Metformin   • Heart disease    • Hypertension    • Knee sprain    • Mini stroke (CMS/[facility])    • EVE (obstructive sleep apnea)    • Rotator cuff syndrome         Past Surgical History:   Procedure Laterality Date   • HERNIA REPAIR     • JOINT MANIPULATION Right 2019    Procedure: KNEE MANIPULATION;  Surgeon: Lopez Mejias MD;  Location: [facility] OR;  Service: Orthopedics   • KNEE SURGERY     • TOTAL KNEE ARTHROPLASTY Right 2019    Procedure: TOTAL KNEE ARTHROPLASTY AND ALL ASSOCIATED PROCEDURES;  Surgeon: Lopez Mejias MD;  Location: [facility] OR;  Service: Orthopedics                       PT Assessment/Plan     Row Name 01/10/20 0715          PT Assessment    Assessment Comments  Pt tolerates current treatment plan well with good motion with passive stretching.   -KM        PT Plan    PT Plan Comments  Plan to decrease frequency to 1x/week. Pt to continue with HEP.   -KM       User Key  (r) = Recorded By, (t) = Taken By, (c) = Cosigned By    Initials Name Provider Type    Mckayla Yu PTA Physical Therapy Assistant            OP Exercises     Row Name 01/10/20 0715             Subjective Comments    Subjective Comments  Pt states his knee is doing well, states he is sore all over after  chopping firewood.   -KM         Exercise 1    Exercise Name 1  Heel Slides  -KM      Time 1  10 min  -KM         Exercise 2    Exercise Name 2  Wall Slides  -KM      Time 2  10 min  -KM         Exercise 3    Exercise Name 3  Bike seat 1  -KM      Time 3  8 min  -KM         Exercise 4    Exercise Name 4  Towel Slides in sitting for knee FLEX  -KM      Cueing 4  Verbal;Tactile  -KM      Time 4  5 min  -KM         Exercise 5    Exercise Name 5  Passive knee FLEX stretch in supine  -KM      Reps 5  10  -KM      Time 5  10 secs  -KM         Exercise 6    Exercise Name 6  Hamstring stretch  -KM      Reps 6  15  -KM      Time 6  10 secs  -KM         Exercise 7    Exercise Name 7  Supine knee EXT on roll  -KM      Time 7  5 min  -KM         Exercise 8    Exercise Name 8  Passive knee extension stretch  -KM      Reps 8  10  -KM      Time 8  10 secs  -KM        User Key  (r) = Recorded By, (t) = Taken By, (c) = Cosigned By    Initials Name Provider Type    Mckayla Yu PTA Physical Therapy Assistant                                          Time Calculation:   Start Time: 0715  Stop Time: 0815  Time Calculation (min): 60 min  Therapy Charges for Today     Code Description Service Date Service Provider Modifiers Qty    13941011187 HC PT THER PROC EA 15 MIN 1/10/2020 Mckayla Perdomo PTA GP 1                    Mckayla Perdomo PTA  1/10/2020

## 2020-01-17 ENCOUNTER — HOSPITAL ENCOUNTER (OUTPATIENT)
Dept: PHYSICAL THERAPY | Facility: HOSPITAL | Age: 70
Setting detail: THERAPIES SERIES
Discharge: HOME | End: 2020-01-17

## 2020-01-17 PROCEDURE — 97140 MANUAL THERAPY 1/> REGIONS: CPT

## 2020-01-17 PROCEDURE — 97110 THERAPEUTIC EXERCISES: CPT

## 2020-01-17 NOTE — THERAPY TREATMENT NOTE
Outpatient Physical Therapy Ortho Treatment Note  FLORI Craven     Patient Name: Jefry Sabillon  : 1950  MRN: [id number]  Today's Date: 2020      Visit Date: 2020    Visit Dx:  No diagnosis found.    Patient Active Problem List   Diagnosis   • Primary osteoarthritis of knees, bilateral   • Acute dyspnea   • Pericarditis   • Essential hypertension   • Mixed hyperlipidemia   • Primary osteoarthritis of right knee   • Status post total right knee replacement        Past Medical History:   Diagnosis Date   • Arthritis    • Asthma    • Cardiac disease    • Coronary artery disease    • Diabetes mellitus (CMS/[facility])     take Metformin   • Heart disease    • Hypertension    • Knee sprain    • Mini stroke (CMS/[facility])    • EVE (obstructive sleep apnea)    • Rotator cuff syndrome         Past Surgical History:   Procedure Laterality Date   • HERNIA REPAIR     • JOINT MANIPULATION Right 2019    Procedure: KNEE MANIPULATION;  Surgeon: Lopez Mejias MD;  Location: [facility] OR;  Service: Orthopedics   • KNEE SURGERY     • TOTAL KNEE ARTHROPLASTY Right 2019    Procedure: TOTAL KNEE ARTHROPLASTY AND ALL ASSOCIATED PROCEDURES;  Surgeon: Lopez Mejias MD;  Location: [facility] OR;  Service: Orthopedics       PT Ortho     Row Name 20 0720       Right Lower Ext    Rt Knee Extension/Flexion AROM  0-114 post stretch  -KM      User Key  (r) = Recorded By, (t) = Taken By, (c) = Cosigned By    Initials Name Provider Type    Mckayla Yu, ANGELITO Physical Therapy Assistant                      PT Assessment/Plan     Row Name 20 0720          PT Assessment    Assessment Comments  Pt ambulates with improved gait technique regarding increased knee flexion and demonstrates improved motion on the bike and AA stretches compared to AAROM measured. Pt has f/u appointment scheduled in about 2 months.   -KM        PT Plan    PT Plan Comments  With pts current status, plan to discontinue PT at this  time and pt to continue with HEP. Instructed to pt to call with any decrease in ROM or issues.   -KM       User Key  (r) = Recorded By, (t) = Taken By, (c) = Cosigned By    Initials Name Provider Type    Mckayla Yu PTA Physical Therapy Assistant            OP Exercises     Row Name 01/17/20 0720             Subjective Comments    Subjective Comments  Pt states his feels good and a little looser today. States he has been compliant with HEP  -KM         Exercise 1    Exercise Name 1  Heel Slides  -KM      Time 1  10 min  -KM         Exercise 2    Exercise Name 2  Wall Slides  -KM      Time 2  10 min  -KM         Exercise 3    Exercise Name 3  Bike seat 1  -KM      Time 3  8 min  -KM         Exercise 4    Exercise Name 4  Towel Slides in sitting for knee FLEX  -KM      Cueing 4  Verbal;Tactile  -KM      Time 4  5 min  -KM         Exercise 5    Exercise Name 5  Passive knee FLEX stretch in supine  -KM      Reps 5  10  -KM      Time 5  10 secs  -KM         Exercise 6    Exercise Name 6  Hamstring stretch  -KM      Reps 6  15  -KM      Time 6  10 secs  -KM         Exercise 7    Exercise Name 7  Supine knee EXT on roll  -KM      Time 7  5 min  -KM         Exercise 8    Exercise Name 8  Passive knee extension stretch  -KM      Reps 8  10  -KM      Time 8  10 secs  -KM        User Key  (r) = Recorded By, (t) = Taken By, (c) = Cosigned By    Initials Name Provider Type    Mckayla Yu PTA Physical Therapy Assistant                                          Time Calculation:   Start Time: 0720  Stop Time: 0820  Time Calculation (min): 60 min  Therapy Charges for Today     Code Description Service Date Service Provider Modifiers Qty    09053316328 HC PT MANUAL THERAPY EA 15 MIN 1/17/2020 Mckayla Perdomo PTA GP 1    58180692381 HC PT THER PROC EA 15 MIN 1/17/2020 Mckayla Perdomo PTA GP 1                    Mckayla Perdomo PTA  1/17/2020

## 2020-03-03 ENCOUNTER — OFFICE VISIT (OUTPATIENT)
Dept: ORTHOPEDIC SURGERY | Facility: CLINIC | Age: 70
End: 2020-03-03

## 2020-03-03 DIAGNOSIS — Z96.651 STATUS POST TOTAL RIGHT KNEE REPLACEMENT: Primary | ICD-10-CM

## 2020-03-03 PROCEDURE — 99212 OFFICE O/P EST SF 10 MIN: CPT | Performed by: ORTHOPAEDIC SURGERY

## 2020-03-03 PROCEDURE — 73562 X-RAY EXAM OF KNEE 3: CPT | Performed by: ORTHOPAEDIC SURGERY

## 2020-03-03 NOTE — PROGRESS NOTES
Subjective:     Patient ID: Jefry Sabillon is a 69 y.o. male.    Chief Complaint: Status post right total knee arthroplasty, 9/4/2019     History of Present Illness  Jefry Sabillon returns to clinic today for evaluation of his right knee. He has continued to work on his at-home exercises and stretches and has been doing well. He denies any residual pain. He does note some muscle tightness with the colder weather but states it resolves after taking a few steps. He denies any associated numbness and tingling.      Social History     Occupational History   • Not on file   Tobacco Use   • Smoking status: Never Smoker   • Smokeless tobacco: Never Used   • Tobacco comment: caffeine use   Substance and Sexual Activity   • Alcohol use: Yes     Comment: occasional. monthly   • Drug use: No   • Sexual activity: Defer      Past Medical History:   Diagnosis Date   • Arthritis    • Asthma    • Cardiac disease    • Coronary artery disease    • Diabetes mellitus (CMS/[facility])     take Metformin   • Heart disease    • Hypertension    • Knee sprain    • Mini stroke (CMS/[facility])    • EVE (obstructive sleep apnea)    • Rotator cuff syndrome      Past Surgical History:   Procedure Laterality Date   • HERNIA REPAIR  2007   • JOINT MANIPULATION Right 11/11/2019    Procedure: KNEE MANIPULATION;  Surgeon: Lopez Mejias MD;  Location: [facility];  Service: Orthopedics   • KNEE SURGERY  1970   • TOTAL KNEE ARTHROPLASTY Right 9/4/2019    Procedure: TOTAL KNEE ARTHROPLASTY AND ALL ASSOCIATED PROCEDURES;  Surgeon: Lopez Mejias MD;  Location: [facility] OR;  Service: Orthopedics       Family History   Problem Relation Age of Onset   • Hypertension Father    • Heart disease Father    • Stroke Father          Review of Systems   Constitutional: Negative for chills, diaphoresis, fever and unexpected weight change.   HENT: Negative for hearing loss, nosebleeds, sore throat and tinnitus.    Eyes: Negative for pain and visual disturbance.    Respiratory: Negative for cough, shortness of breath and wheezing.    Cardiovascular: Negative for chest pain and palpitations.   Gastrointestinal: Negative for abdominal pain, diarrhea, nausea and vomiting.   Endocrine: Negative for cold intolerance, heat intolerance and polydipsia.   Genitourinary: Negative for difficulty urinating, dysuria and hematuria.   Musculoskeletal: Positive for arthralgias.   Skin: Negative for rash and wound.   Allergic/Immunologic: Negative for environmental allergies.   Neurological: Negative for dizziness, syncope and numbness.   Hematological: Does not bruise/bleed easily.   Psychiatric/Behavioral: Negative for dysphoric mood and sleep disturbance. The patient is not nervous/anxious.    All other systems reviewed and are negative.          Objective:  There were no vitals filed for this visit.  There were no vitals filed for this visit.  There is no height or weight on file to calculate BMI.  General: No acute distress.  Resp: normal respiratory effort  Skin: no rashes or wounds; normal turgor  Psych: mood and affect appropriate; recent and remote memory intact      Ortho Exam     Right Knee-   Incisions well healed  ROM 0-110 degrees  4+/5 on flexion  4+/5 on extension  Effusion- minimal   No opening on varus and valgus stress at 0 and 30  Positive sensation light tough all distributions symmetric to contralateral side  Brisk cap refill all digits  2+ dorsalis pedis pulse    Imaging:  Right Knee X-Ray  Indication: s/p total knee     AP, Lateral, and [place] views    Findings:  Total knee arthroplasty components are in stable position with acceptable overall alignment, no evidence of periprosthetic fracture, loosening, osteolysis, or reactive bone formation.    Compared to prior postoperative x-rays    Assessment:        1. Status post total right knee replacement- 9/4/2019           Plan:          1. Discussed treatment options at length with patient at today's visit.    2. Discussed need for prophylactic antibiotics with dental/endoscopy procedures.   3. Advised patient to continue at-home exercise program for improvement in strength, range of motion and function with daily activities.  4. Follow-up in 6 months with repeat xray of the right knee.    Jefry Sabillon was in agreement with plan and had all questions answered.     Orders:  Orders Placed This Encounter   Procedures   • XR Knee 3 View Right       Medications:  No orders of the defined types were placed in this encounter.      Followup:  Return in about 6 months (around 9/3/2020) for xrays needed at follow up.    Jefry was seen today for pain.    Diagnoses and all orders for this visit:    Status post total right knee replacement- 9/4/2019  -     XR Knee 3 View Right        By signing my name here, I Nathaly Driver, attest that all documentation on 03/03/20 at 11:17 AM has been prepared under the direction and in the presence of Dr. Lopez Mejias.    I, Dr. Lopez Mejias, personally performed the services described in this documentation, as scribed by Nathaly Driver, in my presence, and it is both accurate and complete.    Dictated utilizing Dragon dictation

## 2020-03-19 ENCOUNTER — APPOINTMENT (OUTPATIENT)
Dept: SLEEP MEDICINE | Facility: HOSPITAL | Age: 70
End: 2020-03-19

## 2020-03-20 ENCOUNTER — APPOINTMENT (OUTPATIENT)
Dept: SLEEP MEDICINE | Facility: HOSPITAL | Age: 70
End: 2020-03-20

## 2020-03-26 ENCOUNTER — APPOINTMENT (OUTPATIENT)
Dept: SLEEP MEDICINE | Facility: HOSPITAL | Age: 70
End: 2020-03-26

## 2020-04-16 ENCOUNTER — APPOINTMENT (OUTPATIENT)
Dept: SLEEP MEDICINE | Facility: HOSPITAL | Age: 70
End: 2020-04-16

## 2020-07-09 ENCOUNTER — APPOINTMENT (OUTPATIENT)
Dept: SLEEP MEDICINE | Facility: HOSPITAL | Age: 70
End: 2020-07-09

## 2020-07-23 ENCOUNTER — OFFICE VISIT (OUTPATIENT)
Dept: SLEEP MEDICINE | Facility: HOSPITAL | Age: 70
End: 2020-07-23

## 2020-07-23 VITALS
HEART RATE: 68 BPM | DIASTOLIC BLOOD PRESSURE: 72 MMHG | BODY MASS INDEX: 37.25 KG/M2 | WEIGHT: 275 LBS | SYSTOLIC BLOOD PRESSURE: 129 MMHG | HEIGHT: 72 IN

## 2020-07-23 DIAGNOSIS — G47.33 OBSTRUCTIVE SLEEP APNEA, ADULT: Primary | ICD-10-CM

## 2020-07-23 PROCEDURE — G0463 HOSPITAL OUTPT CLINIC VISIT: HCPCS

## 2020-07-23 PROCEDURE — 99213 OFFICE O/P EST LOW 20 MIN: CPT | Performed by: FAMILY MEDICINE

## 2020-07-23 NOTE — PROGRESS NOTES
[facility] New Patient/Consultation       Reason for Consultation: History of sleep apnea      Patient Care Team:  Kash Simpson MD as PCP - General (Family Medicine)  Titi Murphy MD as Consulting Physician (Sleep Medicine)      History of present illness:  Thank you for asking me to see your patient.  The patient is a 69 y.o. male with history of sleep apnea presents today to establish care.  CPAP machine does not seem to be recording any data.  We tried giving him a new chip card however data is corrupted.  Unable to fully data.  We have a sleep study copy on file from June 2013.  This study in the lab showed overall AHI of 12 events per hour.  This was a split-night study.  Patient was best observed on CPAP pressure 11 cm H2O.  Machine is broken beyond repair.    Sleep Schedule:  Bed time: Midnight  Sleep latency: 30 minutes to 60 minutes  Wake time: 8:30 AM  Average hours slept: 8  Non-restorative sleep: Yes  Number of naps per day: 0  Rotating shifts?:  Yes Thursday Friday Saturday 7 PM to 7 AM  Nocturia: 1X  Electronics in bedroom: N    Excessive daytime sleepiness or drowsiness:Y  Any accidents at work due to sleepiness in the last 5 years:N  Any difficulty driving due to sleepiness or being drowsy: N  Weight changed in the last 5 years:N    Snoring:N  Witnessed apneas:Y  Have you ever awakened gasping for breath, coughing, choking or respiratory discomfort: N  Morning headaches: Y  Awaken with a sore throat or dry mouth: Y    Any reports of leg jerking at night: N  Urge sensations: N  Does pain disrupt sleep: N  Sweating during sleep: N  Teeth grinding: N    Any sudden episodes of sleep during the day: N  Sleep paralysis/hallucinations: N  Muscle weakness with laughing/anger: N  Nightmares: N  Sleep walking: N    Are you sleepy when you increase your sleep time: N  Do you sleep better away from your own bed: N    ESS: 4    Social History: Direct support; 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; 1  "alcoholic drink per week; 2 caffeinated beverages a day; no drug use    Review of Systems:    A complete review of systems was done and all were negative with the exception of joint pain    Allergies:  Penicillins    Family History: EVE no       Current Outpatient Medications:   •  allopurinol (ZYLOPRIM) 300 MG tablet, 300 mg Daily., Disp: , Rfl:   •  aspirin 81 MG tablet, Take 81 mg by mouth Daily., Disp: , Rfl:   •  cetirizine (zyrTEC) 10 MG tablet, Take 10 mg by mouth Daily., Disp: , Rfl:   •  clindamycin (CLEOCIN) 300 MG capsule, 2 tablets one hour prior to procedure, Disp: 2 capsule, Rfl: 0  •  doxazosin (CARDURA) 4 MG tablet, Take 1 tablet by mouth Every Night. (Patient taking differently: Take 4 mg by mouth 2 (Two) Times a Day. TAKING TWO TIMES DAILY), Disp: , Rfl:   •  fenofibrate (TRICOR) 145 MG tablet, Take  by mouth Daily., Disp: , Rfl:   •  furosemide (LASIX) 20 MG tablet, Take 20 mg by mouth As Needed., Disp: , Rfl:   •  metFORMIN XR (GLUCOPHAGE-XR) 750 MG 24 hr tablet, Take 750 mg by mouth Daily With Breakfast., Disp: , Rfl:   •  metoprolol succinate XL (TOPROL-XL) 25 MG 24 hr tablet, Take 1 tablet by mouth Daily. (Patient taking differently: Take 25 mg by mouth Daily.), Disp: 30 tablet, Rfl: 2  •  pantoprazole (PROTONIX) 40 MG EC tablet, Take 40 mg by mouth Daily., Disp: , Rfl:   •  valsartan-hydrochlorothiazide (DIOVAN-HCT) 320-25 MG per tablet, Take 1 tablet by mouth., Disp: , Rfl:     Vital Signs:    Vitals:    07/23/20 1100   BP: 129/72   Pulse: 68   Weight: 125 kg (275 lb)   Height: 182.9 cm (72\")      Body mass index is 37.3 kg/m².  Neck Circumference: 18 inches      Physical Exam:   General Alert and oriented. No acute distress noted   Pharynx/Throat Class II/III Mallampati airway, large tongue, no evidence of redundant lateral pharyngeal tissue. No oral lesions. No thrush. Moist mucous membranes.   Head Normocephalic. Symmetrical. Atraumatic.    Nose No septal deviation. No drainage   Chest " Wall Normal shape. Symmetric expansion with respiration. No tenderness.   Neck Trachea midline, no thyromegaly or adenopathy    Lungs Clear to auscultation bilaterally. No wheezes. No rhonchi. No rales. Respirations regular, even and unlabored.   Heart Regular rhythm and normal rate. Normal S1 and S2. No murmur   Abdomen Soft, non-tender and non-distended. Normal bowel sounds. No masses.   Extremities Moves all extremities well. No edema   Psychiatric Normal mood and affect.       Impression:  1. Obstructive sleep apnea, adult        Plan:    Good sleep hygiene measures should be maintained.  Weight loss would be beneficial in this patient who is obese with BMI 37.3.    She is broken beyond repair.  We have a sleep study on file.  Will order new machine CPAP pressure setting of 11 cm H2O.  Return to clinic after at least 1 month use of Pap for follow-up.    Thank you for allowing me to participate in your patient's care.    Titi Murphy MD  Sleep Medicine  07/23/20  11:25

## 2020-09-01 NOTE — PROGRESS NOTES
Subjective:     Patient ID: Jefry Sabillon is a 69 y.o. male.    Chief Complaint: Status post right total knee arthroplasty, 9/4/2019     History of Present Illness  Jefry Sabillon returns to clinic today for evaluation of his right knee. He has noted increased swelling and stiffness in the right knee in the past week, which he attributes to standing in one place for a long period of time while edel vegetables. Denies any significant pain, numbness, or tingling in the knee at this time. He states that he is able to play golf and perform other activities without increased pain.     Social History     Occupational History   • Not on file   Tobacco Use   • Smoking status: Never Smoker   • Smokeless tobacco: Never Used   • Tobacco comment: caffeine use   Substance and Sexual Activity   • Alcohol use: Yes     Comment: occasional. monthly   • Drug use: No   • Sexual activity: Defer      Past Medical History:   Diagnosis Date   • Arthritis    • Asthma    • Cardiac disease    • Coronary artery disease    • Diabetes mellitus (CMS/[facility])     take Metformin   • Heart disease    • Hypertension    • Knee sprain    • Mini stroke (CMS/[facility])    • EVE (obstructive sleep apnea)    • Rotator cuff syndrome      Past Surgical History:   Procedure Laterality Date   • HERNIA REPAIR  2007   • JOINT MANIPULATION Right 11/11/2019    Procedure: KNEE MANIPULATION;  Surgeon: Lopez Mejias MD;  Location: [facility] OR;  Service: Orthopedics   • KNEE SURGERY  1970   • TOTAL KNEE ARTHROPLASTY Right 9/4/2019    Procedure: TOTAL KNEE ARTHROPLASTY AND ALL ASSOCIATED PROCEDURES;  Surgeon: Lopez Mejias MD;  Location: [facility] OR;  Service: Orthopedics       Family History   Problem Relation Age of Onset   • Hypertension Father    • Heart disease Father    • Stroke Father          Review of Systems   Constitutional: Negative for chills, diaphoresis, fever and unexpected weight change.   HENT: Negative for hearing loss, nosebleeds, sore throat and  "tinnitus.    Eyes: Negative for pain and visual disturbance.   Respiratory: Negative for cough, shortness of breath and wheezing.    Cardiovascular: Negative for chest pain and palpitations.   Gastrointestinal: Negative for abdominal pain, diarrhea, nausea and vomiting.   Endocrine: Negative for cold intolerance, heat intolerance and polydipsia.   Genitourinary: Negative for difficulty urinating, dysuria and hematuria.   Musculoskeletal: Positive for arthralgias. Negative for joint swelling and myalgias.   Skin: Negative for rash and wound.   Allergic/Immunologic: Negative for environmental allergies.   Neurological: Negative for dizziness, syncope and numbness.   Hematological: Does not bruise/bleed easily.   Psychiatric/Behavioral: Negative for dysphoric mood and sleep disturbance. The patient is not nervous/anxious.            Objective:  Vitals:    09/03/20 0954   Weight: 125 kg (275 lb)   Height: 182.9 cm (72\")         09/03/20  0954   Weight: 125 kg (275 lb)     Body mass index is 37.3 kg/m².    General: No acute distress.  Resp: normal respiratory effort  Skin: no rashes or wounds; normal turgor  Psych: mood and affect appropriate; recent and remote memory intact        Ortho Exam       Right Knee-    ROM 0-100 degrees  4+/5 on flexion  4/5 on extension    Effusion- minimal   No opening on varus and valgus stress at 0 and 30  Midline incision well healed     Positive sensation light tough all distributions symmetric to contralateral side  Brisk cap refill all digits  2+ dorsalis pedis pulse      Imaging:  Right Knee X-Ray  Indication: s/p total knee     AP, Lateral, and [place] views    Findings:  Total knee arthroplasty components are in stable position with acceptable overall alignment, no evidence of periprosthetic fracture, loosening, osteolysis, or reactive bone formation.    Compared to prior postoperative x-rays    Assessment:        1. Status post total right knee replacement- 9/4/2019       "     Plan:          1. Discussed treatment options at length with patient at today's visit.  2. Follow-up with me in 1 year for repeat x-rays of the right knee.      Jefry Sabillon was in agreement with plan and had all questions answered.     Orders:  Orders Placed This Encounter   Procedures   • XR Knee 3 View Right       Medications:  No orders of the defined types were placed in this encounter.      Followup:  Return in about 1 year (around 9/3/2021) for x-rays needed at follow-up.    Jefry was seen today for follow-up.    Diagnoses and all orders for this visit:    Status post total right knee replacement- 9/4/2019  -     XR Knee 3 View Right           By signing my name here, I Alejandra Waters attest that all documentation on 09/03/20 at 10:32 has been prepared under the direction and in the presence of Dr. Lopez Mejias MD.    I, Dr. Lopez Mejias, personally performed the services described in this documentation, as scribed by Alejandra Waters, in my presence, and it is both accurate and complete.        Dictated utilizing Dragon dictation

## 2020-09-03 ENCOUNTER — OFFICE VISIT (OUTPATIENT)
Dept: ORTHOPEDIC SURGERY | Facility: CLINIC | Age: 70
End: 2020-09-03

## 2020-09-03 VITALS — HEIGHT: 72 IN | BODY MASS INDEX: 37.25 KG/M2 | WEIGHT: 275 LBS

## 2020-09-03 DIAGNOSIS — Z96.651 STATUS POST TOTAL RIGHT KNEE REPLACEMENT: Primary | ICD-10-CM

## 2020-09-03 PROCEDURE — 73562 X-RAY EXAM OF KNEE 3: CPT | Performed by: ORTHOPAEDIC SURGERY

## 2020-09-03 PROCEDURE — 99212 OFFICE O/P EST SF 10 MIN: CPT | Performed by: ORTHOPAEDIC SURGERY

## 2020-09-03 RX ORDER — METOPROLOL SUCCINATE 50 MG/1
50 TABLET, EXTENDED RELEASE ORAL DAILY
COMMUNITY
Start: 2020-06-11

## 2020-09-03 ASSESSMENT — KOOS JR
KOOS JR SCORE: 70.704
KOOS JR SCORE: 6

## 2020-09-17 ENCOUNTER — OFFICE VISIT (OUTPATIENT)
Dept: SLEEP MEDICINE | Facility: HOSPITAL | Age: 70
End: 2020-09-17

## 2020-09-17 VITALS
HEIGHT: 72 IN | HEART RATE: 61 BPM | BODY MASS INDEX: 36.98 KG/M2 | DIASTOLIC BLOOD PRESSURE: 78 MMHG | WEIGHT: 273 LBS | SYSTOLIC BLOOD PRESSURE: 129 MMHG

## 2020-09-17 DIAGNOSIS — G47.33 OBSTRUCTIVE SLEEP APNEA, ADULT: Primary | ICD-10-CM

## 2020-09-17 PROCEDURE — G0463 HOSPITAL OUTPT CLINIC VISIT: HCPCS

## 2020-09-17 PROCEDURE — 99213 OFFICE O/P EST LOW 20 MIN: CPT | Performed by: FAMILY MEDICINE

## 2020-09-17 NOTE — PROGRESS NOTES
Follow Up Sleep Disorders Center Note     Chief Complaint:  EVE     Primary Care Physician: Kash Simpson MD    Jefry Sabillon is a 70 y.o.male  was last seen at [facility] sleep lab: 7/23/2020.  History of sleep apnea; machine was broken beyond repair.  We had a sleep study on file from June 2013 which showed overall AHI of 12 events per hour.  At last visit new machine with pressure setting of 11 cm H2O was ordered.  Patient presents today for one-month follow-up.  Doing well today.  No problems with mask or machine.  No hypersomnia or nonrestorative sleep.    Results Review:  DME is [facility].  Downloads between 8/18/2020-9/16/2020.  Average usage is 6 hours 37 minutes.  Average AHI is 2.1.  Average AutoCPAP pressure is 11 cm H2O.    Current Medications:    Current Outpatient Medications:   •  allopurinol (ZYLOPRIM) 300 MG tablet, 300 mg Daily., Disp: , Rfl:   •  aspirin 81 MG tablet, Take 81 mg by mouth Daily., Disp: , Rfl:   •  cetirizine (zyrTEC) 10 MG tablet, Take 10 mg by mouth Daily., Disp: , Rfl:   •  clindamycin (CLEOCIN) 300 MG capsule, 2 tablets one hour prior to procedure, Disp: 2 capsule, Rfl: 0  •  doxazosin (CARDURA) 4 MG tablet, Take 1 tablet by mouth Every Night. (Patient taking differently: Take 4 mg by mouth 2 (Two) Times a Day. TAKING TWO TIMES DAILY), Disp: , Rfl:   •  fenofibrate (TRICOR) 145 MG tablet, Take  by mouth Daily., Disp: , Rfl:   •  furosemide (LASIX) 20 MG tablet, Take 20 mg by mouth As Needed., Disp: , Rfl:   •  metFORMIN (GLUCOPHAGE) 500 MG tablet, 2 (Two) Times a Day With Meals., Disp: , Rfl:   •  metoprolol succinate XL (TOPROL-XL) 50 MG 24 hr tablet, , Disp: , Rfl:   •  pantoprazole (PROTONIX) 40 MG EC tablet, Take 40 mg by mouth Daily., Disp: , Rfl:   •  valsartan-hydrochlorothiazide (DIOVAN-HCT) 320-25 MG per tablet, Take 1 tablet by mouth., Disp: , Rfl:    also entered in Sleep Questionnaire    Patient  has a past medical history of Arthritis, Asthma, Cardiac disease,  "Coronary artery disease, Diabetes mellitus (CMS/[facility]), Heart disease, Hypertension, Knee sprain, Mini stroke (CMS/H[facility]), EVE (obstructive sleep apnea), and Rotator cuff syndrome.    Social History:    Social History     Socioeconomic History   • Marital status:      Spouse name: Not on file   • Number of children: Not on file   • Years of education: Not on file   • Highest education level: Not on file   Tobacco Use   • Smoking status: Never Smoker   • Smokeless tobacco: Never Used   • Tobacco comment: caffeine use   Substance and Sexual Activity   • Alcohol use: Yes     Comment: occasional. monthly   • Drug use: No   • Sexual activity: Defer       Allergies:  Penicillins    Review of Systems:    A complete review of systems was done and all were negative with the exception of all negative    Vital Signs:    Vitals:    09/17/20 0900   BP: 129/78   Pulse: 61   Weight: 124 kg (273 lb)   Height: 182.9 cm (72\")     Body mass index is 37.03 kg/m².    Vital Signs /78   Pulse 61   Ht 182.9 cm (72\")   Wt 124 kg (273 lb)   BMI 37.03 kg/m²  Body mass index is 37.03 kg/m².    General Alert and oriented. No acute distress noted   Pharynx/Throat Class II/III Mallampati airway, large tongue, no evidence of redundant lateral pharyngeal tissue. No oral lesions. No thrush. Moist mucous membranes.   Head Normocephalic. Symmetrical. Atraumatic.    Nose No septal deviation. No drainage   Chest Wall Normal shape. Symmetric expansion with respiration. No tenderness.   Neck Trachea midline, no thyromegaly or adenopathy    Lungs Clear to auscultation bilaterally. No wheezes. No rhonchi. No rales. Respirations regular, even and unlabored.   Heart Regular rhythm and normal rate. Normal S1 and S2. No murmur   Abdomen Soft, non-tender and non-distended. Normal bowel sounds. No masses.   Extremities Moves all extremities well. No edema   Psychiatric Normal mood and affect.     Impression:  1. Obstructive sleep apnea, adult  "       Obstructive sleep apnea adequately treated with CPAP 11 cm H2O with good compliance and usage and no complaints of hypersomnolence.  Return to clinic in 6 months for follow-up or sooner if needed.    Patient uses the CPAP device and benefits from its use in terms of reduction of hypersomnia and snoring.Weight loss will be strongly beneficial to reduce the severity of sleep-disordered breathing.  Caution during activities that require prolonged concentration is strongly advised if sleepiness returns. Changing of PAP supplies regularly is important for effective use. Patient needs to change cushion on the mask or plugs on nasal pillows along with disposable filters once every month and change mask frame, tubing, headgear and Velcro straps every 6 months at the minimum.    Titi Murphy MD  Sleep Medicine  09/17/20  10:09 EDT

## 2021-03-23 ENCOUNTER — OFFICE VISIT (OUTPATIENT)
Dept: SLEEP MEDICINE | Facility: HOSPITAL | Age: 71
End: 2021-03-23

## 2021-03-23 VITALS
SYSTOLIC BLOOD PRESSURE: 135 MMHG | BODY MASS INDEX: 38.74 KG/M2 | WEIGHT: 286 LBS | DIASTOLIC BLOOD PRESSURE: 81 MMHG | HEIGHT: 72 IN | HEART RATE: 62 BPM

## 2021-03-23 DIAGNOSIS — G47.33 OBSTRUCTIVE SLEEP APNEA: Primary | ICD-10-CM

## 2021-03-23 DIAGNOSIS — I10 ESSENTIAL HYPERTENSION: ICD-10-CM

## 2021-03-23 PROCEDURE — G0463 HOSPITAL OUTPT CLINIC VISIT: HCPCS

## 2021-03-23 NOTE — PROGRESS NOTES
PULMONARY  CONSULT NOTE      PATIENT IDENTIFICATION:  Name: Jefry Sabillon  Age: 70 y.o.  Sex: male  :  1950  MRN: [id number]    DATE OF CONSULTATION:  3/23/2021                     CHIEF COMPLAINT: Obstructive sleep apnea    History of Present Illness:   Jefry Sabillon is a 70 y.o. male Pt on CPAP feeling better more energy especially the night he use it more than 4 hours, no sleepiness no fatigue no tiredness, no mask irritation no dryness, compliance report reviewed with pt AHI< 5 with good usage.       Review of Systems:   Constitutional: negative   Eyes: negative   ENT/oropharynx: negative   Cardiovascular: negative   Respiratory: negative   Gastrointestinal: negative   Genitourinary: negative   Neurological: negative   Musculoskeletal: negative   Integument/breast: negative   Endocrine: negative   Allergic/Immunologic: negative     Past Medical History:  Past Medical History:   Diagnosis Date   • Arthritis    • Asthma    • Cardiac disease    • Coronary artery disease    • Diabetes mellitus (CMS/[facility])     take Metformin   • Heart disease    • Hypertension    • Knee sprain    • Mini stroke (CMS/[facility])    • EVE (obstructive sleep apnea)    • Rotator cuff syndrome        Past Surgical History:  Past Surgical History:   Procedure Laterality Date   • HERNIA REPAIR     • JOINT MANIPULATION Right 2019    Procedure: KNEE MANIPULATION;  Surgeon: Lopez Mejias MD;  Location: [facility] OR;  Service: Orthopedics   • KNEE SURGERY     • TOTAL KNEE ARTHROPLASTY Right 2019    Procedure: TOTAL KNEE ARTHROPLASTY AND ALL ASSOCIATED PROCEDURES;  Surgeon: Lopez Mejias MD;  Location: [facility] OR;  Service: Orthopedics        Family History:  Family History   Problem Relation Age of Onset   • Hypertension Father    • Heart disease Father    • Stroke Father         Social History:   Social History     Tobacco Use   • Smoking status: Never Smoker   • Smokeless tobacco: Never Used   • Tobacco comment:  caffeine use   Substance Use Topics   • Alcohol use: Yes     Comment: occasional. monthly        Allergies:  Allergies   Allergen Reactions   • Penicillins Dermatitis       Home Meds:  (Not in a hospital admission)      Objective:    Vitals Ranges:   Heart Rate:  [62] 62  BP: (135)/(81) 135/81  Body mass index is 38.79 kg/m².     Exam:  General Appearance:  WDWN    HEENT:   without obvious abnormality,  Conjunctiva/corneas clear,  Normal external ear canals, no drainage    Clear orsalmucosa,  Mallampati score 3    Neck:  Supple, symmetrical, trachea midline. No JVD.  Lungs:   Bilateral basal rhonchi bilaterally, respirations unlabored symmetrical wall movement.    Chest wall:  No tenderness or deformity.    Heart:  Regular rate and rhythm, S1 and S2 normal.  Extremities: Trace edema no clubbing or Cyanosis        Data Review:  All labs (24hrs): No results found for this or any previous visit (from the past 24 hour(s)).     Imaging:  XR Knee 3 View Right  Ordering physician's impression is located in the Encounter Note dated 09/03/20.        ASSESSMENT:  Diagnoses and all orders for this visit:    Obstructive sleep apnea    Essential hypertension        PLAN:    This patient with obstructive sleep apnea, compliance is improved. Encourage to use it more frequent, I re-emphasized on pt the long and short term benefit of treating EVE.     Treating EVE will improve BP control    Follow-up 1 year    Kylie Carmen MD. D, ABSM.  3/23/2021  14:13 EDT

## 2021-06-24 ENCOUNTER — HOSPITAL ENCOUNTER (INPATIENT)
Facility: HOSPITAL | Age: 71
LOS: 4 days | Discharge: HOME | End: 2021-06-28
Attending: EMERGENCY MEDICINE | Admitting: FAMILY MEDICINE

## 2021-06-24 ENCOUNTER — APPOINTMENT (OUTPATIENT)
Dept: CT IMAGING | Facility: HOSPITAL | Age: 71
End: 2021-06-24

## 2021-06-24 DIAGNOSIS — K81.0 ACUTE CHOLECYSTITIS: Primary | ICD-10-CM

## 2021-06-24 LAB
ALBUMIN SERPL-MCNC: 4.6 G/DL (ref 3.5–5.2)
ALBUMIN/GLOB SERPL: 1.4 G/DL
ALP SERPL-CCNC: 68 U/L (ref 39–117)
ALT SERPL W P-5'-P-CCNC: 30 U/L (ref 1–41)
ANION GAP SERPL CALCULATED.3IONS-SCNC: 11.9 MMOL/L (ref 5–15)
AST SERPL-CCNC: 19 U/L (ref 1–40)
BASOPHILS # BLD AUTO: 0.01 10*3/MM3 (ref 0–0.2)
BASOPHILS NFR BLD AUTO: 0.1 % (ref 0–1.5)
BILIRUB SERPL-MCNC: 0.9 MG/DL (ref 0–1.2)
BILIRUB UR QL STRIP: NEGATIVE
BUN SERPL-MCNC: 14 MG/DL (ref 8–23)
BUN/CREAT SERPL: 11.7 (ref 7–25)
CALCIUM SPEC-SCNC: 10.1 MG/DL (ref 8.6–10.5)
CHLORIDE SERPL-SCNC: 96 MMOL/L (ref 98–107)
CLARITY UR: CLEAR
CO2 SERPL-SCNC: 28.1 MMOL/L (ref 22–29)
COLOR UR: YELLOW
CREAT SERPL-MCNC: 1.2 MG/DL (ref 0.76–1.27)
DEPRECATED RDW RBC AUTO: 50.4 FL (ref 37–54)
EOSINOPHIL # BLD AUTO: 0.02 10*3/MM3 (ref 0–0.4)
EOSINOPHIL NFR BLD AUTO: 0.2 % (ref 0.3–6.2)
ERYTHROCYTE [DISTWIDTH] IN BLOOD BY AUTOMATED COUNT: 15.8 % (ref 12.3–15.4)
GFR SERPL CREATININE-BSD FRML MDRD: 60 ML/MIN/1.73
GLOBULIN UR ELPH-MCNC: 3.3 GM/DL
GLUCOSE SERPL-MCNC: 121 MG/DL (ref 65–99)
GLUCOSE UR STRIP-MCNC: NEGATIVE MG/DL
HCT VFR BLD AUTO: 42.4 % (ref 37.5–51)
HGB BLD-MCNC: 14.1 G/DL (ref 13–17.7)
HGB UR QL STRIP.AUTO: NEGATIVE
IMM GRANULOCYTES # BLD AUTO: 0.04 10*3/MM3 (ref 0–0.05)
IMM GRANULOCYTES NFR BLD AUTO: 0.3 % (ref 0–0.5)
KETONES UR QL STRIP: NEGATIVE
LEUKOCYTE ESTERASE UR QL STRIP.AUTO: NEGATIVE
LIPASE SERPL-CCNC: 29 U/L (ref 13–60)
LYMPHOCYTES # BLD AUTO: 1.03 10*3/MM3 (ref 0.7–3.1)
LYMPHOCYTES NFR BLD AUTO: 8.7 % (ref 19.6–45.3)
MCH RBC QN AUTO: 29.1 PG (ref 26.6–33)
MCHC RBC AUTO-ENTMCNC: 33.3 G/DL (ref 31.5–35.7)
MCV RBC AUTO: 87.4 FL (ref 79–97)
MONOCYTES # BLD AUTO: 0.94 10*3/MM3 (ref 0.1–0.9)
MONOCYTES NFR BLD AUTO: 7.9 % (ref 5–12)
NEUTROPHILS NFR BLD AUTO: 82.8 % (ref 42.7–76)
NEUTROPHILS NFR BLD AUTO: 9.79 10*3/MM3 (ref 1.7–7)
NITRITE UR QL STRIP: NEGATIVE
NRBC BLD AUTO-RTO: 0 /100 WBC (ref 0–0.2)
PH UR STRIP.AUTO: 7 [PH] (ref 4.5–8)
PLATELET # BLD AUTO: 259 10*3/MM3 (ref 140–450)
PMV BLD AUTO: 10.1 FL (ref 6–12)
POTASSIUM SERPL-SCNC: 3.8 MMOL/L (ref 3.5–5.2)
PROT SERPL-MCNC: 7.9 G/DL (ref 6–8.5)
PROT UR QL STRIP: NEGATIVE
RBC # BLD AUTO: 4.85 10*6/MM3 (ref 4.14–5.8)
SARS-COV-2 RNA PNL SPEC NAA+PROBE: NOT DETECTED
SODIUM SERPL-SCNC: 136 MMOL/L (ref 136–145)
SP GR UR STRIP: 1.02 (ref 1–1.03)
UROBILINOGEN UR QL STRIP: NORMAL
WBC # BLD AUTO: 11.83 10*3/MM3 (ref 3.4–10.8)

## 2021-06-24 PROCEDURE — 87635 SARS-COV-2 COVID-19 AMP PRB: CPT | Performed by: PHYSICIAN ASSISTANT

## 2021-06-24 PROCEDURE — 74177 CT ABD & PELVIS W/CONTRAST: CPT

## 2021-06-24 PROCEDURE — 85025 COMPLETE CBC W/AUTO DIFF WBC: CPT | Performed by: PHYSICIAN ASSISTANT

## 2021-06-24 PROCEDURE — 83690 ASSAY OF LIPASE: CPT | Performed by: PHYSICIAN ASSISTANT

## 2021-06-24 PROCEDURE — 81003 URINALYSIS AUTO W/O SCOPE: CPT | Performed by: PHYSICIAN ASSISTANT

## 2021-06-24 PROCEDURE — 80053 COMPREHEN METABOLIC PANEL: CPT | Performed by: PHYSICIAN ASSISTANT

## 2021-06-24 PROCEDURE — 0 IOPAMIDOL PER 1 ML: Performed by: EMERGENCY MEDICINE

## 2021-06-24 PROCEDURE — 99284 EMERGENCY DEPT VISIT MOD MDM: CPT

## 2021-06-24 RX ORDER — ONDANSETRON 4 MG/1
4 TABLET, FILM COATED ORAL EVERY 6 HOURS PRN
Status: DISCONTINUED | OUTPATIENT
Start: 2021-06-24 | End: 2021-06-28 | Stop reason: HOSPADM

## 2021-06-24 RX ORDER — SODIUM CHLORIDE 0.9 % (FLUSH) 0.9 %
1-10 SYRINGE (ML) INJECTION AS NEEDED
Status: DISCONTINUED | OUTPATIENT
Start: 2021-06-24 | End: 2021-06-28 | Stop reason: HOSPADM

## 2021-06-24 RX ORDER — SODIUM CHLORIDE, SODIUM LACTATE, POTASSIUM CHLORIDE, CALCIUM CHLORIDE 600; 310; 30; 20 MG/100ML; MG/100ML; MG/100ML; MG/100ML
125 INJECTION, SOLUTION INTRAVENOUS CONTINUOUS
Status: DISCONTINUED | OUTPATIENT
Start: 2021-06-24 | End: 2021-06-26

## 2021-06-24 RX ORDER — TERAZOSIN 5 MG/1
5 CAPSULE ORAL NIGHTLY
Status: DISCONTINUED | OUTPATIENT
Start: 2021-06-24 | End: 2021-06-26

## 2021-06-24 RX ORDER — ONDANSETRON 2 MG/ML
4 INJECTION INTRAMUSCULAR; INTRAVENOUS EVERY 6 HOURS PRN
Status: DISCONTINUED | OUTPATIENT
Start: 2021-06-24 | End: 2021-06-28 | Stop reason: HOSPADM

## 2021-06-24 RX ORDER — SODIUM CHLORIDE 0.9 % (FLUSH) 0.9 %
10 SYRINGE (ML) INJECTION AS NEEDED
Status: DISCONTINUED | OUTPATIENT
Start: 2021-06-24 | End: 2021-06-28 | Stop reason: HOSPADM

## 2021-06-24 RX ORDER — SODIUM CHLORIDE 9 MG/ML
40 INJECTION, SOLUTION INTRAVENOUS AS NEEDED
Status: DISCONTINUED | OUTPATIENT
Start: 2021-06-24 | End: 2021-06-28 | Stop reason: HOSPADM

## 2021-06-24 RX ORDER — ACETAMINOPHEN 650 MG/1
650 SUPPOSITORY RECTAL EVERY 4 HOURS PRN
Status: DISCONTINUED | OUTPATIENT
Start: 2021-06-24 | End: 2021-06-28 | Stop reason: HOSPADM

## 2021-06-24 RX ORDER — PANTOPRAZOLE SODIUM 40 MG/10ML
40 INJECTION, POWDER, LYOPHILIZED, FOR SOLUTION INTRAVENOUS
Status: DISCONTINUED | OUTPATIENT
Start: 2021-06-24 | End: 2021-06-27

## 2021-06-24 RX ORDER — HYDRALAZINE HYDROCHLORIDE 20 MG/ML
10 INJECTION INTRAMUSCULAR; INTRAVENOUS EVERY 4 HOURS PRN
Status: DISCONTINUED | OUTPATIENT
Start: 2021-06-24 | End: 2021-06-28 | Stop reason: HOSPADM

## 2021-06-24 RX ORDER — DEXTROSE MONOHYDRATE 25 G/50ML
25 INJECTION, SOLUTION INTRAVENOUS
Status: DISCONTINUED | OUTPATIENT
Start: 2021-06-24 | End: 2021-06-28 | Stop reason: HOSPADM

## 2021-06-24 RX ORDER — SODIUM CHLORIDE 0.9 % (FLUSH) 0.9 %
10 SYRINGE (ML) INJECTION EVERY 12 HOURS SCHEDULED
Status: DISCONTINUED | OUTPATIENT
Start: 2021-06-24 | End: 2021-06-28 | Stop reason: HOSPADM

## 2021-06-24 RX ORDER — NICOTINE POLACRILEX 4 MG
15 LOZENGE BUCCAL
Status: DISCONTINUED | OUTPATIENT
Start: 2021-06-24 | End: 2021-06-28 | Stop reason: HOSPADM

## 2021-06-24 RX ORDER — PANTOPRAZOLE SODIUM 40 MG/10ML
40 INJECTION, POWDER, LYOPHILIZED, FOR SOLUTION INTRAVENOUS EVERY 24 HOURS
Status: DISCONTINUED | OUTPATIENT
Start: 2021-06-24 | End: 2021-06-24 | Stop reason: SDUPTHER

## 2021-06-24 RX ORDER — CEFTRIAXONE 2 G/50ML
2 INJECTION, SOLUTION INTRAVENOUS ONCE
Status: COMPLETED | OUTPATIENT
Start: 2021-06-24 | End: 2021-06-25

## 2021-06-24 RX ORDER — CEFTRIAXONE 1 G/50ML
1 INJECTION, SOLUTION INTRAVENOUS EVERY 24 HOURS
Status: DISCONTINUED | OUTPATIENT
Start: 2021-06-24 | End: 2021-06-24

## 2021-06-24 RX ORDER — PANTOPRAZOLE SODIUM 40 MG/10ML
40 INJECTION, POWDER, LYOPHILIZED, FOR SOLUTION INTRAVENOUS
Status: DISCONTINUED | OUTPATIENT
Start: 2021-06-25 | End: 2021-06-24

## 2021-06-24 RX ORDER — METOPROLOL SUCCINATE 25 MG/1
50 TABLET, EXTENDED RELEASE ORAL
Status: DISCONTINUED | OUTPATIENT
Start: 2021-06-25 | End: 2021-06-24

## 2021-06-24 RX ORDER — ACETAMINOPHEN 160 MG/5ML
650 SOLUTION ORAL EVERY 4 HOURS PRN
Status: DISCONTINUED | OUTPATIENT
Start: 2021-06-24 | End: 2021-06-28 | Stop reason: HOSPADM

## 2021-06-24 RX ORDER — ACETAMINOPHEN 325 MG/1
650 TABLET ORAL EVERY 4 HOURS PRN
Status: DISCONTINUED | OUTPATIENT
Start: 2021-06-24 | End: 2021-06-28 | Stop reason: HOSPADM

## 2021-06-24 RX ADMIN — SODIUM CHLORIDE 1000 ML: 9 INJECTION, SOLUTION INTRAVENOUS at 20:41

## 2021-06-24 RX ADMIN — IOPAMIDOL 100 ML: 755 INJECTION, SOLUTION INTRAVENOUS at 21:28

## 2021-06-25 ENCOUNTER — APPOINTMENT (OUTPATIENT)
Dept: ULTRASOUND IMAGING | Facility: HOSPITAL | Age: 71
End: 2021-06-25

## 2021-06-25 ENCOUNTER — ANESTHESIA EVENT (OUTPATIENT)
Dept: PERIOP | Facility: HOSPITAL | Age: 71
End: 2021-06-25

## 2021-06-25 ENCOUNTER — ANESTHESIA (OUTPATIENT)
Dept: PERIOP | Facility: HOSPITAL | Age: 71
End: 2021-06-25

## 2021-06-25 ENCOUNTER — APPOINTMENT (OUTPATIENT)
Dept: GENERAL RADIOLOGY | Facility: HOSPITAL | Age: 71
End: 2021-06-25

## 2021-06-25 LAB
ALBUMIN SERPL-MCNC: 4.3 G/DL (ref 3.5–5.2)
ALBUMIN/GLOB SERPL: 1.7 G/DL
ALP SERPL-CCNC: 65 U/L (ref 39–117)
ALT SERPL W P-5'-P-CCNC: 27 U/L (ref 1–41)
ANION GAP SERPL CALCULATED.3IONS-SCNC: 12.8 MMOL/L (ref 5–15)
AST SERPL-CCNC: 21 U/L (ref 1–40)
BILIRUB SERPL-MCNC: 1.1 MG/DL (ref 0–1.2)
BUN SERPL-MCNC: 11 MG/DL (ref 8–23)
BUN/CREAT SERPL: 9.2 (ref 7–25)
CALCIUM SPEC-SCNC: 9.4 MG/DL (ref 8.6–10.5)
CHLORIDE SERPL-SCNC: 98 MMOL/L (ref 98–107)
CO2 SERPL-SCNC: 23.2 MMOL/L (ref 22–29)
CREAT SERPL-MCNC: 1.19 MG/DL (ref 0.76–1.27)
DEPRECATED RDW RBC AUTO: 50.2 FL (ref 37–54)
ERYTHROCYTE [DISTWIDTH] IN BLOOD BY AUTOMATED COUNT: 15.9 % (ref 12.3–15.4)
GFR SERPL CREATININE-BSD FRML MDRD: 61 ML/MIN/1.73
GLOBULIN UR ELPH-MCNC: 2.5 GM/DL
GLUCOSE BLDC GLUCOMTR-MCNC: 115 MG/DL (ref 70–130)
GLUCOSE BLDC GLUCOMTR-MCNC: 120 MG/DL (ref 70–130)
GLUCOSE BLDC GLUCOMTR-MCNC: 136 MG/DL (ref 70–130)
GLUCOSE SERPL-MCNC: 132 MG/DL (ref 65–99)
HCT VFR BLD AUTO: 37 % (ref 37.5–51)
HGB BLD-MCNC: 12.5 G/DL (ref 13–17.7)
MCH RBC QN AUTO: 29.2 PG (ref 26.6–33)
MCHC RBC AUTO-ENTMCNC: 33.8 G/DL (ref 31.5–35.7)
MCV RBC AUTO: 86.4 FL (ref 79–97)
PLATELET # BLD AUTO: 219 10*3/MM3 (ref 140–450)
PMV BLD AUTO: 9.9 FL (ref 6–12)
POTASSIUM SERPL-SCNC: 3.6 MMOL/L (ref 3.5–5.2)
PROT SERPL-MCNC: 6.8 G/DL (ref 6–8.5)
RBC # BLD AUTO: 4.28 10*6/MM3 (ref 4.14–5.8)
SODIUM SERPL-SCNC: 134 MMOL/L (ref 136–145)
WBC # BLD AUTO: 14.37 10*3/MM3 (ref 3.4–10.8)

## 2021-06-25 PROCEDURE — 25010000002 PROPOFOL 10 MG/ML EMULSION: Performed by: NURSE ANESTHETIST, CERTIFIED REGISTERED

## 2021-06-25 PROCEDURE — 25010000002 SUCCINYLCHOLINE PER 20 MG: Performed by: NURSE ANESTHETIST, CERTIFIED REGISTERED

## 2021-06-25 PROCEDURE — 94799 UNLISTED PULMONARY SVC/PX: CPT

## 2021-06-25 PROCEDURE — 0FT44ZZ RESECTION OF GALLBLADDER, PERCUTANEOUS ENDOSCOPIC APPROACH: ICD-10-PCS | Performed by: SURGERY

## 2021-06-25 PROCEDURE — BF101ZZ FLUOROSCOPY OF BILE DUCTS USING LOW OSMOLAR CONTRAST: ICD-10-PCS | Performed by: SURGERY

## 2021-06-25 PROCEDURE — 25010000002 KETOROLAC TROMETHAMINE PER 15 MG: Performed by: NURSE ANESTHETIST, CERTIFIED REGISTERED

## 2021-06-25 PROCEDURE — 47563 LAPARO CHOLECYSTECTOMY/GRAPH: CPT | Performed by: SPECIALIST/TECHNOLOGIST, OTHER

## 2021-06-25 PROCEDURE — 99253 IP/OBS CNSLTJ NEW/EST LOW 45: CPT | Performed by: SURGERY

## 2021-06-25 PROCEDURE — 76705 ECHO EXAM OF ABDOMEN: CPT

## 2021-06-25 PROCEDURE — 85027 COMPLETE CBC AUTOMATED: CPT | Performed by: FAMILY MEDICINE

## 2021-06-25 PROCEDURE — 47563 LAPARO CHOLECYSTECTOMY/GRAPH: CPT | Performed by: SURGERY

## 2021-06-25 PROCEDURE — 88304 TISSUE EXAM BY PATHOLOGIST: CPT | Performed by: SURGERY

## 2021-06-25 PROCEDURE — 25010000002 FENTANYL CITRATE (PF) 50 MCG/ML SOLUTION: Performed by: NURSE ANESTHETIST, CERTIFIED REGISTERED

## 2021-06-25 PROCEDURE — 80053 COMPREHEN METABOLIC PANEL: CPT | Performed by: FAMILY MEDICINE

## 2021-06-25 PROCEDURE — C1889 IMPLANT/INSERT DEVICE, NOC: HCPCS | Performed by: SURGERY

## 2021-06-25 PROCEDURE — 25010000002 CEFTRIAXONE SODIUM-DEXTROSE 1-3.74 GM-%(50ML) RECONSTITUTED SOLUTION: Performed by: SURGERY

## 2021-06-25 PROCEDURE — 74300 X-RAY BILE DUCTS/PANCREAS: CPT

## 2021-06-25 PROCEDURE — 25010000002 NEOSTIGMINE 10 MG/10ML SOLUTION: Performed by: NURSE ANESTHETIST, CERTIFIED REGISTERED

## 2021-06-25 PROCEDURE — 82962 GLUCOSE BLOOD TEST: CPT

## 2021-06-25 PROCEDURE — 25010000002 CEFTRIAXONE SODIUM-DEXTROSE 2-2.22 GM-%(50ML) RECONSTITUTED SOLUTION: Performed by: PHYSICIAN ASSISTANT

## 2021-06-25 PROCEDURE — 25010000002 ONDANSETRON PER 1 MG: Performed by: NURSE ANESTHETIST, CERTIFIED REGISTERED

## 2021-06-25 PROCEDURE — 25010000002 DEXAMETHASONE PER 1 MG: Performed by: NURSE ANESTHETIST, CERTIFIED REGISTERED

## 2021-06-25 PROCEDURE — 25010000002 IOPAMIDOL 61 % SOLUTION: Performed by: SURGERY

## 2021-06-25 DEVICE — FLOSEAL HEMOSTATIC MATRIX, 5ML
Type: IMPLANTABLE DEVICE | Site: ABDOMEN | Status: FUNCTIONAL
Brand: FLOSEAL HEMOSTATIC MATRIX

## 2021-06-25 DEVICE — CLIP LIGAT VASC HORIZON TI MD/LG GRN 6CT: Type: IMPLANTABLE DEVICE | Site: ABDOMEN | Status: FUNCTIONAL

## 2021-06-25 RX ORDER — DEXAMETHASONE SODIUM PHOSPHATE 4 MG/ML
8 INJECTION, SOLUTION INTRA-ARTICULAR; INTRALESIONAL; INTRAMUSCULAR; INTRAVENOUS; SOFT TISSUE ONCE AS NEEDED
Status: COMPLETED | OUTPATIENT
Start: 2021-06-25 | End: 2021-06-25

## 2021-06-25 RX ORDER — IPRATROPIUM BROMIDE AND ALBUTEROL SULFATE 2.5; .5 MG/3ML; MG/3ML
3 SOLUTION RESPIRATORY (INHALATION) ONCE
Status: COMPLETED | OUTPATIENT
Start: 2021-06-25 | End: 2021-06-25

## 2021-06-25 RX ORDER — NEOSTIGMINE METHYLSULFATE 1 MG/ML
INJECTION, SOLUTION INTRAVENOUS AS NEEDED
Status: DISCONTINUED | OUTPATIENT
Start: 2021-06-25 | End: 2021-06-25 | Stop reason: SURG

## 2021-06-25 RX ORDER — ONDANSETRON 2 MG/ML
4 INJECTION INTRAMUSCULAR; INTRAVENOUS ONCE AS NEEDED
Status: DISCONTINUED | OUTPATIENT
Start: 2021-06-25 | End: 2021-06-25 | Stop reason: HOSPADM

## 2021-06-25 RX ORDER — ONDANSETRON 2 MG/ML
4 INJECTION INTRAMUSCULAR; INTRAVENOUS ONCE AS NEEDED
Status: COMPLETED | OUTPATIENT
Start: 2021-06-25 | End: 2021-06-25

## 2021-06-25 RX ORDER — ROCURONIUM BROMIDE 10 MG/ML
INJECTION, SOLUTION INTRAVENOUS AS NEEDED
Status: DISCONTINUED | OUTPATIENT
Start: 2021-06-25 | End: 2021-06-25 | Stop reason: SURG

## 2021-06-25 RX ORDER — KETOROLAC TROMETHAMINE 30 MG/ML
INJECTION, SOLUTION INTRAMUSCULAR; INTRAVENOUS AS NEEDED
Status: DISCONTINUED | OUTPATIENT
Start: 2021-06-25 | End: 2021-06-25 | Stop reason: SURG

## 2021-06-25 RX ORDER — HYDROCODONE BITARTRATE AND ACETAMINOPHEN 5; 325 MG/1; MG/1
1 TABLET ORAL EVERY 4 HOURS PRN
Status: DISCONTINUED | OUTPATIENT
Start: 2021-06-25 | End: 2021-06-28 | Stop reason: HOSPADM

## 2021-06-25 RX ORDER — NALOXONE HCL 0.4 MG/ML
0.1 VIAL (ML) INJECTION
Status: DISCONTINUED | OUTPATIENT
Start: 2021-06-25 | End: 2021-06-28 | Stop reason: HOSPADM

## 2021-06-25 RX ORDER — HYDROMORPHONE HYDROCHLORIDE 1 MG/ML
0.5 INJECTION, SOLUTION INTRAMUSCULAR; INTRAVENOUS; SUBCUTANEOUS
Status: DISCONTINUED | OUTPATIENT
Start: 2021-06-25 | End: 2021-06-25 | Stop reason: HOSPADM

## 2021-06-25 RX ORDER — CEFTRIAXONE 1 G/50ML
1 INJECTION, SOLUTION INTRAVENOUS EVERY 24 HOURS
Status: DISCONTINUED | OUTPATIENT
Start: 2021-06-25 | End: 2021-06-25

## 2021-06-25 RX ORDER — HYDROMORPHONE HYDROCHLORIDE 1 MG/ML
1 INJECTION, SOLUTION INTRAMUSCULAR; INTRAVENOUS; SUBCUTANEOUS
Status: DISCONTINUED | OUTPATIENT
Start: 2021-06-25 | End: 2021-06-25 | Stop reason: HOSPADM

## 2021-06-25 RX ORDER — OXYCODONE AND ACETAMINOPHEN 7.5; 325 MG/1; MG/1
1 TABLET ORAL ONCE AS NEEDED
Status: DISCONTINUED | OUTPATIENT
Start: 2021-06-25 | End: 2021-06-25 | Stop reason: HOSPADM

## 2021-06-25 RX ORDER — MAGNESIUM HYDROXIDE 1200 MG/15ML
LIQUID ORAL AS NEEDED
Status: DISCONTINUED | OUTPATIENT
Start: 2021-06-25 | End: 2021-06-25 | Stop reason: HOSPADM

## 2021-06-25 RX ORDER — HYDROMORPHONE HCL 110MG/55ML
0.5 PATIENT CONTROLLED ANALGESIA SYRINGE INTRAVENOUS
Status: DISCONTINUED | OUTPATIENT
Start: 2021-06-25 | End: 2021-06-27

## 2021-06-25 RX ORDER — ACETAMINOPHEN 650 MG/1
650 SUPPOSITORY RECTAL ONCE AS NEEDED
Status: DISCONTINUED | OUTPATIENT
Start: 2021-06-25 | End: 2021-06-25 | Stop reason: HOSPADM

## 2021-06-25 RX ORDER — ACETAMINOPHEN 650 MG/1
650 SUPPOSITORY RECTAL EVERY 4 HOURS PRN
Status: DISCONTINUED | OUTPATIENT
Start: 2021-06-25 | End: 2021-06-28 | Stop reason: HOSPADM

## 2021-06-25 RX ORDER — ONDANSETRON 4 MG/1
4 TABLET, FILM COATED ORAL 4 TIMES DAILY PRN
Status: DISCONTINUED | OUTPATIENT
Start: 2021-06-25 | End: 2021-06-25 | Stop reason: SDUPTHER

## 2021-06-25 RX ORDER — SODIUM CHLORIDE 0.9 % (FLUSH) 0.9 %
10 SYRINGE (ML) INJECTION EVERY 12 HOURS SCHEDULED
Status: DISCONTINUED | OUTPATIENT
Start: 2021-06-25 | End: 2021-06-25 | Stop reason: HOSPADM

## 2021-06-25 RX ORDER — FENTANYL CITRATE 50 UG/ML
INJECTION, SOLUTION INTRAMUSCULAR; INTRAVENOUS AS NEEDED
Status: DISCONTINUED | OUTPATIENT
Start: 2021-06-25 | End: 2021-06-25 | Stop reason: SURG

## 2021-06-25 RX ORDER — HYDROCODONE BITARTRATE AND ACETAMINOPHEN 7.5; 325 MG/1; MG/1
1 TABLET ORAL EVERY 4 HOURS PRN
Status: DISCONTINUED | OUTPATIENT
Start: 2021-06-25 | End: 2021-06-28 | Stop reason: HOSPADM

## 2021-06-25 RX ORDER — LIDOCAINE HYDROCHLORIDE 20 MG/ML
INJECTION, SOLUTION INFILTRATION; PERINEURAL AS NEEDED
Status: DISCONTINUED | OUTPATIENT
Start: 2021-06-25 | End: 2021-06-25 | Stop reason: SURG

## 2021-06-25 RX ORDER — MEPERIDINE HYDROCHLORIDE 25 MG/ML
12.5 INJECTION INTRAMUSCULAR; INTRAVENOUS; SUBCUTANEOUS
Status: DISCONTINUED | OUTPATIENT
Start: 2021-06-25 | End: 2021-06-25 | Stop reason: HOSPADM

## 2021-06-25 RX ORDER — PROPOFOL 10 MG/ML
VIAL (ML) INTRAVENOUS AS NEEDED
Status: DISCONTINUED | OUTPATIENT
Start: 2021-06-25 | End: 2021-06-25 | Stop reason: SURG

## 2021-06-25 RX ORDER — MIDAZOLAM HYDROCHLORIDE 2 MG/2ML
0.5 INJECTION, SOLUTION INTRAMUSCULAR; INTRAVENOUS
Status: DISCONTINUED | OUTPATIENT
Start: 2021-06-25 | End: 2021-06-25 | Stop reason: HOSPADM

## 2021-06-25 RX ORDER — SODIUM CHLORIDE 9 MG/ML
40 INJECTION, SOLUTION INTRAVENOUS AS NEEDED
Status: DISCONTINUED | OUTPATIENT
Start: 2021-06-25 | End: 2021-06-25 | Stop reason: HOSPADM

## 2021-06-25 RX ORDER — BUPIVACAINE HYDROCHLORIDE 2.5 MG/ML
INJECTION, SOLUTION EPIDURAL; INFILTRATION; INTRACAUDAL AS NEEDED
Status: DISCONTINUED | OUTPATIENT
Start: 2021-06-25 | End: 2021-06-25 | Stop reason: HOSPADM

## 2021-06-25 RX ORDER — FAMOTIDINE 10 MG/ML
20 INJECTION, SOLUTION INTRAVENOUS 2 TIMES DAILY
Status: DISCONTINUED | OUTPATIENT
Start: 2021-06-25 | End: 2021-06-27

## 2021-06-25 RX ORDER — ONDANSETRON 2 MG/ML
4 INJECTION INTRAMUSCULAR; INTRAVENOUS 4 TIMES DAILY PRN
Status: DISCONTINUED | OUTPATIENT
Start: 2021-06-25 | End: 2021-06-25 | Stop reason: SDUPTHER

## 2021-06-25 RX ORDER — KETAMINE HYDROCHLORIDE 100 MG/ML
INJECTION INTRAMUSCULAR; INTRAVENOUS AS NEEDED
Status: DISCONTINUED | OUTPATIENT
Start: 2021-06-25 | End: 2021-06-25 | Stop reason: SURG

## 2021-06-25 RX ORDER — SODIUM CHLORIDE 0.9 % (FLUSH) 0.9 %
10 SYRINGE (ML) INJECTION AS NEEDED
Status: DISCONTINUED | OUTPATIENT
Start: 2021-06-25 | End: 2021-06-25 | Stop reason: HOSPADM

## 2021-06-25 RX ORDER — SODIUM CHLORIDE, SODIUM LACTATE, POTASSIUM CHLORIDE, CALCIUM CHLORIDE 600; 310; 30; 20 MG/100ML; MG/100ML; MG/100ML; MG/100ML
9 INJECTION, SOLUTION INTRAVENOUS CONTINUOUS PRN
Status: DISCONTINUED | OUTPATIENT
Start: 2021-06-25 | End: 2021-06-27

## 2021-06-25 RX ORDER — SUCCINYLCHOLINE CHLORIDE 20 MG/ML
INJECTION INTRAMUSCULAR; INTRAVENOUS AS NEEDED
Status: DISCONTINUED | OUTPATIENT
Start: 2021-06-25 | End: 2021-06-25 | Stop reason: SURG

## 2021-06-25 RX ORDER — FAMOTIDINE 10 MG/ML
20 INJECTION, SOLUTION INTRAVENOUS
Status: COMPLETED | OUTPATIENT
Start: 2021-06-25 | End: 2021-06-25

## 2021-06-25 RX ORDER — DIPHENHYDRAMINE HYDROCHLORIDE 50 MG/ML
12.5 INJECTION INTRAMUSCULAR; INTRAVENOUS
Status: DISCONTINUED | OUTPATIENT
Start: 2021-06-25 | End: 2021-06-25 | Stop reason: HOSPADM

## 2021-06-25 RX ORDER — CEFTRIAXONE 1 G/50ML
1 INJECTION, SOLUTION INTRAVENOUS EVERY 24 HOURS
Status: DISCONTINUED | OUTPATIENT
Start: 2021-06-25 | End: 2021-06-27

## 2021-06-25 RX ORDER — LIDOCAINE HYDROCHLORIDE 10 MG/ML
0.5 INJECTION, SOLUTION EPIDURAL; INFILTRATION; INTRACAUDAL; PERINEURAL ONCE AS NEEDED
Status: DISCONTINUED | OUTPATIENT
Start: 2021-06-25 | End: 2021-06-25 | Stop reason: HOSPADM

## 2021-06-25 RX ORDER — ACETAMINOPHEN 325 MG/1
650 TABLET ORAL ONCE AS NEEDED
Status: DISCONTINUED | OUTPATIENT
Start: 2021-06-25 | End: 2021-06-25 | Stop reason: HOSPADM

## 2021-06-25 RX ORDER — CLINDAMYCIN PHOSPHATE 900 MG/50ML
900 INJECTION INTRAVENOUS EVERY 8 HOURS
Status: DISCONTINUED | OUTPATIENT
Start: 2021-06-25 | End: 2021-06-25 | Stop reason: HOSPADM

## 2021-06-25 RX ORDER — ACETAMINOPHEN 325 MG/1
650 TABLET ORAL EVERY 4 HOURS PRN
Status: DISCONTINUED | OUTPATIENT
Start: 2021-06-25 | End: 2021-06-28 | Stop reason: HOSPADM

## 2021-06-25 RX ORDER — GLYCOPYRROLATE 0.2 MG/ML
INJECTION INTRAMUSCULAR; INTRAVENOUS AS NEEDED
Status: DISCONTINUED | OUTPATIENT
Start: 2021-06-25 | End: 2021-06-25 | Stop reason: SURG

## 2021-06-25 RX ADMIN — DEXAMETHASONE SODIUM PHOSPHATE 8 MG: 4 INJECTION, SOLUTION INTRAMUSCULAR; INTRAVENOUS at 16:22

## 2021-06-25 RX ADMIN — CEFTRIAXONE 1 G: 1 INJECTION, SOLUTION INTRAVENOUS at 22:46

## 2021-06-25 RX ADMIN — ONDANSETRON 4 MG: 2 INJECTION INTRAMUSCULAR; INTRAVENOUS at 16:23

## 2021-06-25 RX ADMIN — FENTANYL CITRATE 25 MCG: 50 INJECTION INTRAMUSCULAR; INTRAVENOUS at 18:15

## 2021-06-25 RX ADMIN — SODIUM CHLORIDE, PRESERVATIVE FREE 10 ML: 5 INJECTION INTRAVENOUS at 21:21

## 2021-06-25 RX ADMIN — ACETAMINOPHEN 650 MG: 325 TABLET, FILM COATED ORAL at 06:19

## 2021-06-25 RX ADMIN — NEOSTIGMINE METHYLSULFATE 3 MG: 1 INJECTION INTRAVENOUS at 18:33

## 2021-06-25 RX ADMIN — PROPOFOL 150 MG: 10 INJECTION, EMULSION INTRAVENOUS at 17:21

## 2021-06-25 RX ADMIN — ROCURONIUM BROMIDE 28 MG: 10 INJECTION INTRAVENOUS at 17:27

## 2021-06-25 RX ADMIN — GLYCOPYRROLATE 0.4 MG: 0.2 INJECTION INTRAMUSCULAR; INTRAVENOUS at 18:33

## 2021-06-25 RX ADMIN — GLYCOPYRROLATE 0.1 MG: 0.2 INJECTION INTRAMUSCULAR; INTRAVENOUS at 17:18

## 2021-06-25 RX ADMIN — IPRATROPIUM BROMIDE AND ALBUTEROL SULFATE 3 ML: .5; 3 SOLUTION RESPIRATORY (INHALATION) at 16:19

## 2021-06-25 RX ADMIN — KETOROLAC TROMETHAMINE 30 MG: 30 INJECTION, SOLUTION INTRAMUSCULAR; INTRAVENOUS at 17:52

## 2021-06-25 RX ADMIN — KETAMINE HYDROCHLORIDE 10 MG: 100 INJECTION INTRAMUSCULAR; INTRAVENOUS at 17:37

## 2021-06-25 RX ADMIN — SODIUM CHLORIDE, PRESERVATIVE FREE 10 ML: 5 INJECTION INTRAVENOUS at 09:06

## 2021-06-25 RX ADMIN — FAMOTIDINE 20 MG: 10 INJECTION INTRAVENOUS at 21:19

## 2021-06-25 RX ADMIN — SODIUM CHLORIDE, POTASSIUM CHLORIDE, SODIUM LACTATE AND CALCIUM CHLORIDE: 600; 310; 30; 20 INJECTION, SOLUTION INTRAVENOUS at 17:14

## 2021-06-25 RX ADMIN — CEFTRIAXONE 2 G: 2 INJECTION, SOLUTION INTRAVENOUS at 00:11

## 2021-06-25 RX ADMIN — TERAZOSIN HYDROCHLORIDE 5 MG: 5 CAPSULE ORAL at 00:07

## 2021-06-25 RX ADMIN — PANTOPRAZOLE SODIUM 40 MG: 40 INJECTION, POWDER, FOR SOLUTION INTRAVENOUS at 00:07

## 2021-06-25 RX ADMIN — LIDOCAINE HYDROCHLORIDE 60 MG: 20 INJECTION, SOLUTION INFILTRATION; PERINEURAL at 17:18

## 2021-06-25 RX ADMIN — CLINDAMYCIN IN 5 PERCENT DEXTROSE 900 MG: 18 INJECTION, SOLUTION INTRAVENOUS at 17:18

## 2021-06-25 RX ADMIN — SODIUM CHLORIDE, POTASSIUM CHLORIDE, SODIUM LACTATE AND CALCIUM CHLORIDE 9 ML/HR: 600; 310; 30; 20 INJECTION, SOLUTION INTRAVENOUS at 16:26

## 2021-06-25 RX ADMIN — ROCURONIUM BROMIDE 10 MG: 10 INJECTION INTRAVENOUS at 18:15

## 2021-06-25 RX ADMIN — KETAMINE HYDROCHLORIDE 20 MG: 100 INJECTION INTRAMUSCULAR; INTRAVENOUS at 17:17

## 2021-06-25 RX ADMIN — PANTOPRAZOLE SODIUM 40 MG: 40 INJECTION, POWDER, FOR SOLUTION INTRAVENOUS at 06:19

## 2021-06-25 RX ADMIN — ROCURONIUM BROMIDE 2 MG: 10 INJECTION INTRAVENOUS at 17:18

## 2021-06-25 RX ADMIN — SUCCINYLCHOLINE CHLORIDE 120 MG: 20 INJECTION, SOLUTION INTRAMUSCULAR; INTRAVENOUS at 17:21

## 2021-06-25 RX ADMIN — SODIUM CHLORIDE, PRESERVATIVE FREE 10 ML: 5 INJECTION INTRAVENOUS at 00:11

## 2021-06-25 RX ADMIN — FAMOTIDINE 20 MG: 10 INJECTION, SOLUTION INTRAVENOUS at 16:23

## 2021-06-25 RX ADMIN — FENTANYL CITRATE 25 MCG: 50 INJECTION INTRAMUSCULAR; INTRAVENOUS at 17:32

## 2021-06-25 RX ADMIN — SODIUM CHLORIDE, POTASSIUM CHLORIDE, SODIUM LACTATE AND CALCIUM CHLORIDE 125 ML/HR: 600; 310; 30; 20 INJECTION, SOLUTION INTRAVENOUS at 00:10

## 2021-06-25 NOTE — ANESTHESIA PREPROCEDURE EVALUATION
" Anesthesia Evaluation     Patient summary reviewed and Nursing notes reviewed   no history of anesthetic complications:  NPO Solid Status: > 8 hours  NPO Liquid Status: > 8 hours           Airway   Mallampati: II  TM distance: >3 FB  Neck ROM: full  No difficulty expected  Dental          Pulmonary     breath sounds clear to auscultation  (+) sleep apnea on CPAP, decreased breath sounds,   Asthma: no inhaler use for 1 year.  Cardiovascular - normal exam    ECG reviewed  Beta blocker given within 24 hours of surgery and Beta blocker not taken-may be given intraoperatively  Rhythm: regular  Rate: normal    (+) hypertension well controlled 2 medications or greater,   CAD: denies.    ROS comment: 3/16/18  Stress Echo: Interpretation Summary    · Left ventricular wall thickness is consistent with mild concentric hypertrophy.  · Left ventricular systolic function is hyperdynamic (EF > 70).  · Left ventricular diastolic dysfunction (grade II) consistent with pseudonormalization.        Neuro/Psych  CVA: \"mini stroke\" 1996 --lethargy resolved after \"a couple of hours\"  GI/Hepatic/Renal/Endo    (+) obesity,   diabetes mellitus type 2 well controlled,     Musculoskeletal     (+) back pain,   Abdominal   (+) obese,    Substance History - negative use     OB/GYN          Other   arthritis,                    Anesthesia Plan    ASA 3 - emergent     general     intravenous induction     Anesthetic plan, all risks, benefits, and alternatives have been provided, discussed and informed consent has been obtained with: patient.  Use of blood products discussed with consented to blood products.     "

## 2021-06-25 NOTE — ANESTHESIA PROCEDURE NOTES
Airway  Urgency: elective    Date/Time: 6/25/2021 5:22 PM  Airway not difficult    General Information and Staff    Patient location during procedure: OR  CRNA: Christoph Wise CRNA    Indications and Patient Condition  Indications for airway management: airway protection    Preoxygenated: yes  MILS maintained throughout  Mask difficulty assessment: 1 - vent by mask    Final Airway Details  Final airway type: endotracheal airway      Successful airway: ETT  Cuffed: yes   Successful intubation technique: direct laryngoscopy  Endotracheal tube insertion site: oral  Blade: Tonny  Blade size: 4  ETT size (mm): 7.5  Cormack-Lehane Classification: grade IIa - partial view of glottis  Placement verified by: chest auscultation and capnometry   Measured from: lips  ETT/EBT  to lips (cm): 23  Number of attempts at approach: 1  Assessment: lips, teeth, and gum same as pre-op and atraumatic intubation    Additional Comments  To OR, monitors and O2 on. Smooth IV ind. - intubated x 1 attempt through clear cords + BBS. Tape OU. Pressure points checked and padded

## 2021-06-25 NOTE — ANESTHESIA POSTPROCEDURE EVALUATION
Patient: Jefry Sabillon    Procedure Summary     Date: 06/25/21 Room / Location: [facility] OR 1 /  [facility] OR    Anesthesia Start: 1714 Anesthesia Stop: 1854    Procedure: CHOLECYSTECTOMY LAPAROSCOPIC INTRAOPERATIVE CHOLANGIOGRAM (N/A Abdomen) Diagnosis:       Acute cholecystitis      (Acute cholecystitis [K81.0])    Surgeons: Chikis Wing MD Provider: Christoph Wise CRNA    Anesthesia Type: general ASA Status: 3 - Emergent          Anesthesia Type: general    Vitals  Vitals Value Taken Time   BP 92/53 06/25/21 1931   Temp 99.1 °F (37.3 °C) 06/25/21 1931   Pulse 68 06/25/21 1936   Resp 18 06/25/21 1931   SpO2 93 % 06/25/21 1936   Vitals shown include unvalidated device data.        Post Anesthesia Care and Evaluation    Patient location during evaluation: bedside  Patient participation: complete - patient participated  Level of consciousness: awake and alert  Pain score: 0  Pain management: adequate  Airway patency: patent  Anesthetic complications: No anesthetic complications    Cardiovascular status: acceptable  Respiratory status: acceptable  Hydration status: acceptable

## 2021-06-26 LAB
ALBUMIN SERPL-MCNC: 3.6 G/DL (ref 3.5–5.2)
ALBUMIN/GLOB SERPL: 1.3 G/DL
ALP SERPL-CCNC: 74 U/L (ref 39–117)
ALT SERPL W P-5'-P-CCNC: 66 U/L (ref 1–41)
ANION GAP SERPL CALCULATED.3IONS-SCNC: 10.2 MMOL/L (ref 5–15)
AST SERPL-CCNC: 59 U/L (ref 1–40)
BASOPHILS # BLD AUTO: 0.01 10*3/MM3 (ref 0–0.2)
BASOPHILS NFR BLD AUTO: 0.1 % (ref 0–1.5)
BILIRUB SERPL-MCNC: 1 MG/DL (ref 0–1.2)
BUN SERPL-MCNC: 22 MG/DL (ref 8–23)
BUN/CREAT SERPL: 16.4 (ref 7–25)
CALCIUM SPEC-SCNC: 9 MG/DL (ref 8.6–10.5)
CHLORIDE SERPL-SCNC: 101 MMOL/L (ref 98–107)
CO2 SERPL-SCNC: 24.8 MMOL/L (ref 22–29)
CREAT SERPL-MCNC: 1.34 MG/DL (ref 0.76–1.27)
DEPRECATED RDW RBC AUTO: 52.3 FL (ref 37–54)
EOSINOPHIL # BLD AUTO: 0 10*3/MM3 (ref 0–0.4)
EOSINOPHIL NFR BLD AUTO: 0 % (ref 0.3–6.2)
ERYTHROCYTE [DISTWIDTH] IN BLOOD BY AUTOMATED COUNT: 15.9 % (ref 12.3–15.4)
GFR SERPL CREATININE-BSD FRML MDRD: 53 ML/MIN/1.73
GLOBULIN UR ELPH-MCNC: 2.8 GM/DL
GLUCOSE BLDC GLUCOMTR-MCNC: 131 MG/DL (ref 70–130)
GLUCOSE BLDC GLUCOMTR-MCNC: 131 MG/DL (ref 70–130)
GLUCOSE BLDC GLUCOMTR-MCNC: 143 MG/DL (ref 70–130)
GLUCOSE BLDC GLUCOMTR-MCNC: 165 MG/DL (ref 70–130)
GLUCOSE SERPL-MCNC: 164 MG/DL (ref 65–99)
HCT VFR BLD AUTO: 34.7 % (ref 37.5–51)
HGB BLD-MCNC: 11.4 G/DL (ref 13–17.7)
IMM GRANULOCYTES # BLD AUTO: 0.04 10*3/MM3 (ref 0–0.05)
IMM GRANULOCYTES NFR BLD AUTO: 0.3 % (ref 0–0.5)
LYMPHOCYTES # BLD AUTO: 0.55 10*3/MM3 (ref 0.7–3.1)
LYMPHOCYTES NFR BLD AUTO: 4.8 % (ref 19.6–45.3)
MCH RBC QN AUTO: 29.1 PG (ref 26.6–33)
MCHC RBC AUTO-ENTMCNC: 32.9 G/DL (ref 31.5–35.7)
MCV RBC AUTO: 88.5 FL (ref 79–97)
MONOCYTES # BLD AUTO: 0.93 10*3/MM3 (ref 0.1–0.9)
MONOCYTES NFR BLD AUTO: 8.1 % (ref 5–12)
NEUTROPHILS NFR BLD AUTO: 86.7 % (ref 42.7–76)
NEUTROPHILS NFR BLD AUTO: 9.93 10*3/MM3 (ref 1.7–7)
PLATELET # BLD AUTO: 181 10*3/MM3 (ref 140–450)
PMV BLD AUTO: 10.2 FL (ref 6–12)
POTASSIUM SERPL-SCNC: 4.3 MMOL/L (ref 3.5–5.2)
PROT SERPL-MCNC: 6.4 G/DL (ref 6–8.5)
RBC # BLD AUTO: 3.92 10*6/MM3 (ref 4.14–5.8)
SODIUM SERPL-SCNC: 136 MMOL/L (ref 136–145)
WBC # BLD AUTO: 11.46 10*3/MM3 (ref 3.4–10.8)

## 2021-06-26 PROCEDURE — 94799 UNLISTED PULMONARY SVC/PX: CPT

## 2021-06-26 PROCEDURE — 82962 GLUCOSE BLOOD TEST: CPT

## 2021-06-26 PROCEDURE — 80053 COMPREHEN METABOLIC PANEL: CPT | Performed by: SURGERY

## 2021-06-26 PROCEDURE — 85025 COMPLETE CBC W/AUTO DIFF WBC: CPT | Performed by: SURGERY

## 2021-06-26 PROCEDURE — 25010000002 CEFTRIAXONE SODIUM-DEXTROSE 1-3.74 GM-%(50ML) RECONSTITUTED SOLUTION: Performed by: SURGERY

## 2021-06-26 PROCEDURE — 99024 POSTOP FOLLOW-UP VISIT: CPT | Performed by: SURGERY

## 2021-06-26 RX ORDER — SODIUM CHLORIDE, SODIUM LACTATE, POTASSIUM CHLORIDE, CALCIUM CHLORIDE 600; 310; 30; 20 MG/100ML; MG/100ML; MG/100ML; MG/100ML
100 INJECTION, SOLUTION INTRAVENOUS CONTINUOUS
Status: DISCONTINUED | OUTPATIENT
Start: 2021-06-26 | End: 2021-06-27

## 2021-06-26 RX ADMIN — SODIUM CHLORIDE, POTASSIUM CHLORIDE, SODIUM LACTATE AND CALCIUM CHLORIDE 100 ML/HR: 600; 310; 30; 20 INJECTION, SOLUTION INTRAVENOUS at 21:25

## 2021-06-26 RX ADMIN — FAMOTIDINE 20 MG: 10 INJECTION INTRAVENOUS at 20:51

## 2021-06-26 RX ADMIN — PANTOPRAZOLE SODIUM 40 MG: 40 INJECTION, POWDER, FOR SOLUTION INTRAVENOUS at 05:34

## 2021-06-26 RX ADMIN — CEFTRIAXONE 1 G: 1 INJECTION, SOLUTION INTRAVENOUS at 23:53

## 2021-06-26 RX ADMIN — SODIUM CHLORIDE, POTASSIUM CHLORIDE, SODIUM LACTATE AND CALCIUM CHLORIDE 125 ML/HR: 600; 310; 30; 20 INJECTION, SOLUTION INTRAVENOUS at 11:18

## 2021-06-26 RX ADMIN — SODIUM CHLORIDE, POTASSIUM CHLORIDE, SODIUM LACTATE AND CALCIUM CHLORIDE 125 ML/HR: 600; 310; 30; 20 INJECTION, SOLUTION INTRAVENOUS at 01:06

## 2021-06-26 RX ADMIN — FAMOTIDINE 20 MG: 10 INJECTION INTRAVENOUS at 08:56

## 2021-06-27 LAB
ALBUMIN SERPL-MCNC: 3.4 G/DL (ref 3.5–5.2)
ALBUMIN/GLOB SERPL: 1.2 G/DL
ALP SERPL-CCNC: 88 U/L (ref 39–117)
ALT SERPL W P-5'-P-CCNC: 65 U/L (ref 1–41)
ANION GAP SERPL CALCULATED.3IONS-SCNC: 9.7 MMOL/L (ref 5–15)
AST SERPL-CCNC: 44 U/L (ref 1–40)
BILIRUB SERPL-MCNC: 0.7 MG/DL (ref 0–1.2)
BUN SERPL-MCNC: 22 MG/DL (ref 8–23)
BUN/CREAT SERPL: 20.8 (ref 7–25)
CALCIUM SPEC-SCNC: 9.1 MG/DL (ref 8.6–10.5)
CHLORIDE SERPL-SCNC: 101 MMOL/L (ref 98–107)
CO2 SERPL-SCNC: 24.3 MMOL/L (ref 22–29)
CREAT SERPL-MCNC: 1.06 MG/DL (ref 0.76–1.27)
DEPRECATED RDW RBC AUTO: 54.4 FL (ref 37–54)
ERYTHROCYTE [DISTWIDTH] IN BLOOD BY AUTOMATED COUNT: 16.2 % (ref 12.3–15.4)
GFR SERPL CREATININE-BSD FRML MDRD: 69 ML/MIN/1.73
GLOBULIN UR ELPH-MCNC: 2.9 GM/DL
GLUCOSE BLDC GLUCOMTR-MCNC: 104 MG/DL (ref 70–130)
GLUCOSE BLDC GLUCOMTR-MCNC: 104 MG/DL (ref 70–130)
GLUCOSE BLDC GLUCOMTR-MCNC: 91 MG/DL (ref 70–130)
GLUCOSE SERPL-MCNC: 103 MG/DL (ref 65–99)
HCT VFR BLD AUTO: 33.6 % (ref 37.5–51)
HGB BLD-MCNC: 11 G/DL (ref 13–17.7)
MCH RBC QN AUTO: 29.6 PG (ref 26.6–33)
MCHC RBC AUTO-ENTMCNC: 32.7 G/DL (ref 31.5–35.7)
MCV RBC AUTO: 90.3 FL (ref 79–97)
PLATELET # BLD AUTO: 196 10*3/MM3 (ref 140–450)
PMV BLD AUTO: 10.6 FL (ref 6–12)
POTASSIUM SERPL-SCNC: 4 MMOL/L (ref 3.5–5.2)
PROT SERPL-MCNC: 6.3 G/DL (ref 6–8.5)
RBC # BLD AUTO: 3.72 10*6/MM3 (ref 4.14–5.8)
SODIUM SERPL-SCNC: 135 MMOL/L (ref 136–145)
WBC # BLD AUTO: 10.98 10*3/MM3 (ref 3.4–10.8)

## 2021-06-27 PROCEDURE — 94799 UNLISTED PULMONARY SVC/PX: CPT

## 2021-06-27 PROCEDURE — 85027 COMPLETE CBC AUTOMATED: CPT | Performed by: SURGERY

## 2021-06-27 PROCEDURE — 99024 POSTOP FOLLOW-UP VISIT: CPT | Performed by: SURGERY

## 2021-06-27 PROCEDURE — 82962 GLUCOSE BLOOD TEST: CPT

## 2021-06-27 PROCEDURE — 80053 COMPREHEN METABOLIC PANEL: CPT | Performed by: SURGERY

## 2021-06-27 RX ORDER — CEFUROXIME AXETIL 250 MG/1
500 TABLET ORAL EVERY 12 HOURS SCHEDULED
Status: DISCONTINUED | OUTPATIENT
Start: 2021-06-27 | End: 2021-06-28 | Stop reason: HOSPADM

## 2021-06-27 RX ORDER — PANTOPRAZOLE SODIUM 40 MG/1
40 TABLET, DELAYED RELEASE ORAL
Status: DISCONTINUED | OUTPATIENT
Start: 2021-06-28 | End: 2021-06-28 | Stop reason: HOSPADM

## 2021-06-27 RX ADMIN — SODIUM CHLORIDE, POTASSIUM CHLORIDE, SODIUM LACTATE AND CALCIUM CHLORIDE 100 ML/HR: 600; 310; 30; 20 INJECTION, SOLUTION INTRAVENOUS at 10:27

## 2021-06-27 RX ADMIN — CEFUROXIME AXETIL 500 MG: 250 TABLET, FILM COATED ORAL at 12:08

## 2021-06-27 RX ADMIN — SODIUM CHLORIDE, PRESERVATIVE FREE 10 ML: 5 INJECTION INTRAVENOUS at 21:24

## 2021-06-27 RX ADMIN — CEFUROXIME AXETIL 500 MG: 250 TABLET, FILM COATED ORAL at 21:23

## 2021-06-27 RX ADMIN — PANTOPRAZOLE SODIUM 40 MG: 40 INJECTION, POWDER, FOR SOLUTION INTRAVENOUS at 06:27

## 2021-06-27 RX ADMIN — ACETAMINOPHEN 650 MG: 325 TABLET, FILM COATED ORAL at 16:26

## 2021-06-27 RX ADMIN — FAMOTIDINE 20 MG: 10 INJECTION INTRAVENOUS at 10:26

## 2021-06-28 VITALS
WEIGHT: 274.38 LBS | RESPIRATION RATE: 20 BRPM | BODY MASS INDEX: 37.16 KG/M2 | OXYGEN SATURATION: 98 % | HEIGHT: 72 IN | DIASTOLIC BLOOD PRESSURE: 71 MMHG | HEART RATE: 70 BPM | SYSTOLIC BLOOD PRESSURE: 120 MMHG | TEMPERATURE: 98 F

## 2021-06-28 PROBLEM — R73.01 IFG (IMPAIRED FASTING GLUCOSE): Status: ACTIVE | Noted: 2021-06-28

## 2021-06-28 PROBLEM — R06.00 ACUTE DYSPNEA: Status: RESOLVED | Noted: 2018-03-15 | Resolved: 2021-06-28

## 2021-06-28 PROBLEM — I31.9 PERICARDITIS: Status: RESOLVED | Noted: 2018-05-14 | Resolved: 2021-06-28

## 2021-06-28 PROBLEM — M17.11 PRIMARY OSTEOARTHRITIS OF RIGHT KNEE: Status: RESOLVED | Noted: 2019-07-08 | Resolved: 2021-06-28

## 2021-06-28 LAB
CYTO UR: NORMAL
GLUCOSE BLDC GLUCOMTR-MCNC: 116 MG/DL (ref 70–130)
LAB AP CASE REPORT: NORMAL
PATH REPORT.FINAL DX SPEC: NORMAL
PATH REPORT.GROSS SPEC: NORMAL

## 2021-06-28 PROCEDURE — 82962 GLUCOSE BLOOD TEST: CPT

## 2021-06-28 RX ORDER — CEFUROXIME AXETIL 500 MG/1
500 TABLET ORAL EVERY 12 HOURS SCHEDULED
Qty: 8 TABLET | Refills: 0 | Status: SHIPPED | OUTPATIENT
Start: 2021-06-28 | End: 2021-07-02

## 2021-06-28 RX ADMIN — PANTOPRAZOLE SODIUM 40 MG: 40 TABLET, DELAYED RELEASE ORAL at 06:36

## 2021-06-29 ENCOUNTER — READMISSION MANAGEMENT (OUTPATIENT)
Dept: CALL CENTER | Facility: HOSPITAL | Age: 71
End: 2021-06-29

## 2021-06-29 NOTE — OUTREACH NOTE
Prep Survey      Responses   [facility] patient discharged from?  [place]   Is LACE score < 7 ?  No   Emergency Room discharge w/ pulse ox?  No   Eligibility  Readm Mgmt   Discharge diagnosis  CHOLECYSTECTOMY LAPAROSCOPIC INTRAOPERATIVE CHOLANGIOGRAM   Does the patient have one of the following disease processes/diagnoses(primary or secondary)?  General Surgery   Does the patient have Home health ordered?  No   Is there a DME ordered?  No   Prep survey completed?  Yes          Anastasia Bhatt RN

## 2021-07-02 ENCOUNTER — READMISSION MANAGEMENT (OUTPATIENT)
Dept: CALL CENTER | Facility: HOSPITAL | Age: 71
End: 2021-07-02

## 2021-07-02 NOTE — OUTREACH NOTE
General Surgery Week 1 Survey      Responses   [facility] patient discharged from?  [place]   Does the patient have one of the following disease processes/diagnoses(primary or secondary)?  General Surgery   Week 1 attempt successful?  No   Unsuccessful attempts  Attempt 1          Quita Bhatt RN  
independent

## 2021-07-03 ENCOUNTER — READMISSION MANAGEMENT (OUTPATIENT)
Dept: CALL CENTER | Facility: HOSPITAL | Age: 71
End: 2021-07-03

## 2021-07-03 NOTE — OUTREACH NOTE
General Surgery Week 1 Survey      Responses   [facility] patient discharged from?  [place]   Does the patient have one of the following disease processes/diagnoses(primary or secondary)?  General Surgery   Week 1 attempt successful?  Yes   Call start time  1657   Call end time  1700   Discharge diagnosis  CHOLECYSTECTOMY LAPAROSCOPIC INTRAOPERATIVE CHOLANGIOGRAM   Is patient permission given to speak with other caregiver?  No   Meds reviewed with patient/caregiver?  Yes   Does the patient have all medications related to this admission filled (includes all antibiotics, pain medications, etc.)  Yes   Is the patient taking all medications as directed (includes completed medication regime)?  Yes   Does the patient have a follow up appointment scheduled with their surgeon?  Yes   Has the patient kept scheduled appointments due by today?  Yes [Has seen PCP since discharge. ]   Has home health visited the patient within 72 hours of discharge?  N/A   Psychosocial issues?  No   Did the patient receive a copy of their discharge instructions?  Yes   Nursing interventions  Reviewed instructions with patient   What is the patient's perception of their health status since discharge?  Improving   Nursing interventions  Nurse provided patient education   Is the patient /caregiver able to teach back basic post-op care?  Lifting as instructed by MD in discharge instructions   Is the patient/caregiver able to teach back signs and symptoms of incisional infection?  Increased redness, swelling or pain at the incisonal site, Increased drainage or bleeding, Incisional warmth, Pus or odor from incision, Fever   Is the patient/caregiver able to teach back steps to recovery at home?  Set small, achievable goals for return to baseline health, Rest and rebuild strength, gradually increase activity   If the patient is a current smoker, are they able to teach back resources for cessation?  Not a smoker   Is the patient/caregiver able to  teach back the hierarchy of who to call/visit for symptoms/problems? PCP, Specialist, Home health nurse, Urgent Care, ED, 911  Yes   Week 1 call completed?  Yes   Revoked  No further contact(revokes)-requires comment   Is the patient interested in additional calls from an ambulatory ?  NOTE:  applies to high risk patients requiring additional follow-up.  No   Graduated/Revoked comments  Patient states that he is doing well. Appts in place. Denies any questions or needs today. No further calls.           Letty Batista RN

## 2021-07-21 ENCOUNTER — OFFICE VISIT (OUTPATIENT)
Dept: SURGERY | Facility: CLINIC | Age: 71
End: 2021-07-21

## 2021-07-21 VITALS
HEIGHT: 72 IN | OXYGEN SATURATION: 97 % | DIASTOLIC BLOOD PRESSURE: 78 MMHG | HEART RATE: 86 BPM | SYSTOLIC BLOOD PRESSURE: 126 MMHG | TEMPERATURE: 97.5 F | RESPIRATION RATE: 18 BRPM | WEIGHT: 273.2 LBS | BODY MASS INDEX: 37 KG/M2

## 2021-07-21 DIAGNOSIS — Z09 FOLLOW UP: Primary | ICD-10-CM

## 2021-07-21 PROCEDURE — 99024 POSTOP FOLLOW-UP VISIT: CPT | Performed by: SURGERY

## 2021-07-21 RX ORDER — DOXAZOSIN MESYLATE 4 MG/1
TABLET ORAL
COMMUNITY
Start: 2021-05-14

## 2021-07-21 NOTE — PROGRESS NOTES
"Chief complaint:    Chief Complaint   Patient presents with   • Post-op     lap jorden       History of Present Illness    This is Jefry Sabillon 70 y.o. status post laparoscopic cholecystectomy and is doing very well.  Patient denies fever, chills, nausea or vomiting.  Patient's pain is well-controlled.      The following portions of the patient's history were reviewed and updated as appropriate: allergies, current medications, past family history, past medical history, past social history, past surgical history and problem list.    Vitals:    07/21/21 1034   Resp: 18   Weight: 124 kg (273 lb 3.2 oz)   Height: 182.9 cm (72.01\")       Physical Exam  Gen.: Patient is alert and oriented ×3, no acute distress  HEENT: Normal cephalic, atraumatic, moist mucous membranes, anicteric  Incision is well-healed without evidence of infection, herniation or seroma.    Assessment/plan:    S/P laparoscopic cholecystectomy  I have instructed the patient not lift greater than 10 pounds for total of 6 weeks from the time of surgery.   I have instructed the patient follow-up as needed.    Chikis Wing MD  General, Minimally Invasive and Endoscopic Surgery  [facility]    [address] [facility] [address] [facility] [address] [facility] [address] [facility] [address]    [address]  [address] [address]    P: [phone]  F: [phone]    Cc:  Kash Simpson MD  "

## 2021-09-02 ENCOUNTER — OFFICE VISIT (OUTPATIENT)
Dept: ORTHOPEDIC SURGERY | Facility: CLINIC | Age: 71
End: 2021-09-02

## 2021-09-02 VITALS — HEIGHT: 73 IN | WEIGHT: 273 LBS | BODY MASS INDEX: 36.18 KG/M2

## 2021-09-02 DIAGNOSIS — Z96.651 STATUS POST TOTAL RIGHT KNEE REPLACEMENT: Primary | ICD-10-CM

## 2021-09-02 PROCEDURE — 99212 OFFICE O/P EST SF 10 MIN: CPT | Performed by: ORTHOPAEDIC SURGERY

## 2021-09-02 PROCEDURE — 73562 X-RAY EXAM OF KNEE 3: CPT | Performed by: ORTHOPAEDIC SURGERY

## 2021-09-02 RX ORDER — VALSARTAN AND HYDROCHLOROTHIAZIDE 320; 25 MG/1; MG/1
TABLET, FILM COATED ORAL
COMMUNITY
Start: 2021-08-12

## 2021-09-02 ASSESSMENT — KOOS JR
KOOS JR SCORE: 4
KOOS JR SCORE: 76.332

## 2021-09-02 NOTE — PROGRESS NOTES
"Subjective:     Patient ID: Jefry Sabillon is a 70 y.o. male.    Chief Complaint:  Status post right total knee arthroplasty, 9/4/2019  History of Present Illness  Jefry aSbillon returns to clinic today for evaluation of status post right knee arthroplasty. The patient reports he is doing well overall, but feels he does not have the range of motion he would like. He reports that when he placed a roll of paper towels between his knees and squeezed he got mild manipulation release. The patient reports pain is worsened with driving his \"gator\" around due to how the seated position is and the gas pedal sits high.     He notes his hip pain has improved, but his sciatic nerves flares up occasionally. The patient states the paid will last approximately 1 week, than will subside.      Social History     Occupational History   • Not on file   Tobacco Use   • Smoking status: Never Smoker   • Smokeless tobacco: Never Used   • Tobacco comment: caffeine use   Vaping Use   • Vaping Use: Never used   Substance and Sexual Activity   • Alcohol use: Never   • Drug use: No   • Sexual activity: Yes      Past Medical History:   Diagnosis Date   • Arthritis    • Asthma    • Cardiac disease    • Coronary artery disease    • Diabetes mellitus (CMS/[facility])     take Metformin   • Heart disease    • Hypertension    • IFG (impaired fasting glucose) 6/28/2021   • Knee sprain    • Mini stroke (CMS/[facility])    • EVE (obstructive sleep apnea)    • Rotator cuff syndrome      Past Surgical History:   Procedure Laterality Date   • CHOLECYSTECTOMY WITH INTRAOPERATIVE CHOLANGIOGRAM N/A 6/25/2021    Procedure: CHOLECYSTECTOMY LAPAROSCOPIC INTRAOPERATIVE CHOLANGIOGRAM;  Surgeon: Chikis Wing MD;  Location:  [facility] OR;  Service: General;  Laterality: N/A;   • HERNIA REPAIR  2007   • JOINT MANIPULATION Right 11/11/2019    Procedure: KNEE MANIPULATION;  Surgeon: Lopez Mejias MD;  Location:  [facility] OR;  Service: Orthopedics   • KNEE SURGERY  1970   • " "TOTAL KNEE ARTHROPLASTY Right 9/4/2019    Procedure: TOTAL KNEE ARTHROPLASTY AND ALL ASSOCIATED PROCEDURES;  Surgeon: Lopez Mejias MD;  Location: [facility];  Service: Orthopedics       Family History   Problem Relation Age of Onset   • Hypertension Father    • Heart disease Father    • Stroke Father          Review of Systems   Constitutional: Negative for chills, diaphoresis, fever and unexpected weight change.   HENT: Negative for hearing loss, nosebleeds, sore throat and tinnitus.    Eyes: Negative for pain and visual disturbance.   Respiratory: Negative for cough, shortness of breath and wheezing.    Cardiovascular: Negative for chest pain and palpitations.   Gastrointestinal: Negative for abdominal pain, diarrhea, nausea and vomiting.   Endocrine: Negative for cold intolerance, heat intolerance and polydipsia.   Genitourinary: Negative for difficulty urinating, dysuria and hematuria.   Musculoskeletal: Positive for arthralgias and myalgias. Negative for joint swelling.   Skin: Negative for rash and wound.   Allergic/Immunologic: Negative for environmental allergies.   Neurological: Negative for dizziness, syncope and numbness.   Hematological: Does not bruise/bleed easily.   Psychiatric/Behavioral: Negative for dysphoric mood and sleep disturbance. The patient is not nervous/anxious.            Objective:  Vitals:    09/02/21 1005   Weight: 124 kg (273 lb)   Height: 185.4 cm (73.01\")         09/02/21  1005   Weight: 124 kg (273 lb)     Body mass index is 36.01 kg/m².  General: No acute distress.  Resp: normal respiratory effort  Skin: no rashes or wounds; normal turgor  Psych: mood and affect appropriate; recent and remote memory intact          Ortho Exam     Right Knee-  Midline incisions well healed.   ROM 0 to 95  degrees  4+/5 on flexion  4+/5 on extension  Effusion- mild  Stable to varus and valgus at 0 and 30.   Imaging:  Right Knee X-Ray  Indication: s/p total knee     AP, Lateral, and Sunrise " views    Findings:  Total knee arthroplasty components are in stable position with acceptable overall alignment, no evidence of periprosthetic fracture, loosening, osteolysis, or reactive bone formation.    Compared to prior postoperative x-rays    Assessment:        1. Status post total right knee replacement           Plan:          1. Discussed treatment options at length with patient at today's visit.  2. I am pleases with the patient's progress and he is very happy with his outcome following surgery and his rehabilitation program.  3. Follow up: as-needed if any reoccurrence of pain or inflammation.       Jefry Sabillon was in agreement with plan and had all questions answered.     Orders:  Orders Placed This Encounter   Procedures   • XR Knee 3 View Right       Medications:  No orders of the defined types were placed in this encounter.      Followup:  Return if symptoms worsen or fail to improve.    Diagnoses and all orders for this visit:    1. Status post total right knee replacement (Primary)  -     XR Knee 3 View Right          Dictated utilizing Dragon dictation     Scribed for Lopez Mejias MD by Taylor Kovacs.  9/6/2021  12:06 EDT

## 2021-10-27 ENCOUNTER — HOSPITAL ENCOUNTER (OUTPATIENT)
Dept: GENERAL RADIOLOGY | Facility: HOSPITAL | Age: 71
Discharge: HOME | End: 2021-10-27
Admitting: NURSE PRACTITIONER

## 2021-10-27 ENCOUNTER — TRANSCRIBE ORDERS (OUTPATIENT)
Dept: ADMINISTRATIVE | Facility: HOSPITAL | Age: 71
End: 2021-10-27

## 2021-10-27 DIAGNOSIS — M54.41 ACUTE BACK PAIN WITH SCIATICA, RIGHT: ICD-10-CM

## 2021-10-27 DIAGNOSIS — M54.41 ACUTE BACK PAIN WITH SCIATICA, RIGHT: Primary | ICD-10-CM

## 2021-10-27 PROCEDURE — 72100 X-RAY EXAM L-S SPINE 2/3 VWS: CPT

## 2022-03-15 ENCOUNTER — APPOINTMENT (OUTPATIENT)
Dept: SLEEP MEDICINE | Facility: HOSPITAL | Age: 72
End: 2022-03-15

## 2023-04-19 ENCOUNTER — HOSPITAL ENCOUNTER (OUTPATIENT)
Facility: HOSPITAL | Age: 73
Discharge: HOME | End: 2023-04-20
Attending: FAMILY MEDICINE | Admitting: FAMILY MEDICINE
Payer: MEDICARE

## 2023-04-19 ENCOUNTER — APPOINTMENT (OUTPATIENT)
Dept: CARDIOLOGY | Facility: HOSPITAL | Age: 73
End: 2023-04-19
Payer: MEDICARE

## 2023-04-19 PROBLEM — I48.91 ATRIAL FIBRILLATION: Status: ACTIVE | Noted: 2023-04-19

## 2023-04-19 LAB
ALBUMIN SERPL-MCNC: 4.5 G/DL (ref 3.5–5.2)
ALBUMIN/GLOB SERPL: 1.9 G/DL
ALP SERPL-CCNC: 72 U/L (ref 39–117)
ALT SERPL W P-5'-P-CCNC: 49 U/L (ref 1–41)
ANION GAP SERPL CALCULATED.3IONS-SCNC: 12.2 MMOL/L (ref 5–15)
AST SERPL-CCNC: 40 U/L (ref 1–40)
BILIRUB SERPL-MCNC: 0.5 MG/DL (ref 0–1.2)
BUN SERPL-MCNC: 20 MG/DL (ref 8–23)
BUN/CREAT SERPL: 17.7 (ref 7–25)
CALCIUM SPEC-SCNC: 9.9 MG/DL (ref 8.6–10.5)
CHLORIDE SERPL-SCNC: 100 MMOL/L (ref 98–107)
CO2 SERPL-SCNC: 24.8 MMOL/L (ref 22–29)
CREAT SERPL-MCNC: 1.13 MG/DL (ref 0.76–1.27)
DEPRECATED RDW RBC AUTO: 49.3 FL (ref 37–54)
EGFRCR SERPLBLD CKD-EPI 2021: 69.9 ML/MIN/1.73
ERYTHROCYTE [DISTWIDTH] IN BLOOD BY AUTOMATED COUNT: 14.8 % (ref 12.3–15.4)
GLOBULIN UR ELPH-MCNC: 2.4 GM/DL
GLUCOSE SERPL-MCNC: 112 MG/DL (ref 65–99)
HCT VFR BLD AUTO: 41.6 % (ref 37.5–51)
HGB BLD-MCNC: 13.7 G/DL (ref 13–17.7)
MAGNESIUM SERPL-MCNC: 1.7 MG/DL (ref 1.6–2.4)
MCH RBC QN AUTO: 29.2 PG (ref 26.6–33)
MCHC RBC AUTO-ENTMCNC: 32.9 G/DL (ref 31.5–35.7)
MCV RBC AUTO: 88.7 FL (ref 79–97)
NT-PROBNP SERPL-MCNC: 137.1 PG/ML (ref 0–900)
PLATELET # BLD AUTO: 210 10*3/MM3 (ref 140–450)
PMV BLD AUTO: 9.6 FL (ref 6–12)
POTASSIUM SERPL-SCNC: 4.2 MMOL/L (ref 3.5–5.2)
PROT SERPL-MCNC: 6.9 G/DL (ref 6–8.5)
QT INTERVAL: 417 MS
RBC # BLD AUTO: 4.69 10*6/MM3 (ref 4.14–5.8)
SODIUM SERPL-SCNC: 137 MMOL/L (ref 136–145)
TSH SERPL DL<=0.05 MIU/L-ACNC: 2.64 UIU/ML (ref 0.27–4.2)
WBC NRBC COR # BLD: 5.58 10*3/MM3 (ref 3.4–10.8)

## 2023-04-19 PROCEDURE — 94761 N-INVAS EAR/PLS OXIMETRY MLT: CPT

## 2023-04-19 PROCEDURE — 25510000001 PERFLUTREN (DEFINITY) 8.476 MG IN SODIUM CHLORIDE (PF) 0.9 % 10 ML INJECTION: Performed by: FAMILY MEDICINE

## 2023-04-19 PROCEDURE — 80061 LIPID PANEL: CPT | Performed by: INTERNAL MEDICINE

## 2023-04-19 PROCEDURE — 80053 COMPREHEN METABOLIC PANEL: CPT | Performed by: FAMILY MEDICINE

## 2023-04-19 PROCEDURE — 96374 THER/PROPH/DIAG INJ IV PUSH: CPT

## 2023-04-19 PROCEDURE — 93306 TTE W/DOPPLER COMPLETE: CPT

## 2023-04-19 PROCEDURE — 83735 ASSAY OF MAGNESIUM: CPT | Performed by: FAMILY MEDICINE

## 2023-04-19 PROCEDURE — G0378 HOSPITAL OBSERVATION PER HR: HCPCS

## 2023-04-19 PROCEDURE — 96376 TX/PRO/DX INJ SAME DRUG ADON: CPT

## 2023-04-19 PROCEDURE — 93306 TTE W/DOPPLER COMPLETE: CPT | Performed by: INTERNAL MEDICINE

## 2023-04-19 PROCEDURE — 96375 TX/PRO/DX INJ NEW DRUG ADDON: CPT

## 2023-04-19 PROCEDURE — 83880 ASSAY OF NATRIURETIC PEPTIDE: CPT | Performed by: FAMILY MEDICINE

## 2023-04-19 PROCEDURE — 93010 ELECTROCARDIOGRAM REPORT: CPT | Performed by: INTERNAL MEDICINE

## 2023-04-19 PROCEDURE — 93005 ELECTROCARDIOGRAM TRACING: CPT | Performed by: FAMILY MEDICINE

## 2023-04-19 PROCEDURE — 84443 ASSAY THYROID STIM HORMONE: CPT | Performed by: FAMILY MEDICINE

## 2023-04-19 PROCEDURE — 84484 ASSAY OF TROPONIN QUANT: CPT | Performed by: FAMILY MEDICINE

## 2023-04-19 PROCEDURE — 85027 COMPLETE CBC AUTOMATED: CPT | Performed by: FAMILY MEDICINE

## 2023-04-19 PROCEDURE — 25010000002 DIGOXIN PER 500 MCG: Performed by: FAMILY MEDICINE

## 2023-04-19 RX ORDER — METOPROLOL SUCCINATE 50 MG/1
50 TABLET, EXTENDED RELEASE ORAL DAILY
Status: DISCONTINUED | OUTPATIENT
Start: 2023-04-19 | End: 2023-04-19

## 2023-04-19 RX ORDER — SODIUM CHLORIDE 0.9 % (FLUSH) 0.9 %
10 SYRINGE (ML) INJECTION EVERY 12 HOURS SCHEDULED
Status: DISCONTINUED | OUTPATIENT
Start: 2023-04-19 | End: 2023-04-20 | Stop reason: HOSPADM

## 2023-04-19 RX ORDER — DIGOXIN 0.25 MG/ML
250 INJECTION INTRAMUSCULAR; INTRAVENOUS ONCE
Status: COMPLETED | OUTPATIENT
Start: 2023-04-19 | End: 2023-04-19

## 2023-04-19 RX ORDER — METOPROLOL SUCCINATE 50 MG/1
100 TABLET, EXTENDED RELEASE ORAL DAILY
Status: DISCONTINUED | OUTPATIENT
Start: 2023-04-20 | End: 2023-04-20

## 2023-04-19 RX ORDER — FENOFIBRATE 145 MG/1
145 TABLET, COATED ORAL DAILY
Status: DISCONTINUED | OUTPATIENT
Start: 2023-04-20 | End: 2023-04-20 | Stop reason: HOSPADM

## 2023-04-19 RX ORDER — VALSARTAN 80 MG/1
320 TABLET ORAL
Status: DISCONTINUED | OUTPATIENT
Start: 2023-04-20 | End: 2023-04-20

## 2023-04-19 RX ORDER — ONDANSETRON 4 MG/1
4 TABLET, FILM COATED ORAL EVERY 6 HOURS PRN
Status: DISCONTINUED | OUTPATIENT
Start: 2023-04-19 | End: 2023-04-20 | Stop reason: HOSPADM

## 2023-04-19 RX ORDER — BISACODYL 5 MG/1
5 TABLET, DELAYED RELEASE ORAL DAILY PRN
Status: DISCONTINUED | OUTPATIENT
Start: 2023-04-19 | End: 2023-04-20 | Stop reason: HOSPADM

## 2023-04-19 RX ORDER — POLYETHYLENE GLYCOL 3350 17 G/17G
17 POWDER, FOR SOLUTION ORAL DAILY PRN
Status: DISCONTINUED | OUTPATIENT
Start: 2023-04-19 | End: 2023-04-20 | Stop reason: HOSPADM

## 2023-04-19 RX ORDER — ACETAMINOPHEN 650 MG/1
650 SUPPOSITORY RECTAL EVERY 4 HOURS PRN
Status: DISCONTINUED | OUTPATIENT
Start: 2023-04-19 | End: 2023-04-20 | Stop reason: HOSPADM

## 2023-04-19 RX ORDER — PANTOPRAZOLE SODIUM 40 MG/1
40 TABLET, DELAYED RELEASE ORAL DAILY
Status: DISCONTINUED | OUTPATIENT
Start: 2023-04-19 | End: 2023-04-20 | Stop reason: HOSPADM

## 2023-04-19 RX ORDER — ALLOPURINOL 300 MG/1
300 TABLET ORAL DAILY
Status: DISCONTINUED | OUTPATIENT
Start: 2023-04-19 | End: 2023-04-20 | Stop reason: HOSPADM

## 2023-04-19 RX ORDER — HYDROCHLOROTHIAZIDE 25 MG/1
25 TABLET ORAL
Status: DISCONTINUED | OUTPATIENT
Start: 2023-04-20 | End: 2023-04-20

## 2023-04-19 RX ORDER — FUROSEMIDE 20 MG/1
20 TABLET ORAL DAILY
Status: DISCONTINUED | OUTPATIENT
Start: 2023-04-19 | End: 2023-04-19

## 2023-04-19 RX ORDER — ACETAMINOPHEN 325 MG/1
650 TABLET ORAL EVERY 4 HOURS PRN
Status: DISCONTINUED | OUTPATIENT
Start: 2023-04-19 | End: 2023-04-20 | Stop reason: HOSPADM

## 2023-04-19 RX ORDER — CETIRIZINE HYDROCHLORIDE 10 MG/1
10 TABLET ORAL DAILY
Status: DISCONTINUED | OUTPATIENT
Start: 2023-04-20 | End: 2023-04-20 | Stop reason: HOSPADM

## 2023-04-19 RX ORDER — BISACODYL 10 MG
10 SUPPOSITORY, RECTAL RECTAL DAILY PRN
Status: DISCONTINUED | OUTPATIENT
Start: 2023-04-19 | End: 2023-04-20 | Stop reason: HOSPADM

## 2023-04-19 RX ORDER — ONDANSETRON 2 MG/ML
4 INJECTION INTRAMUSCULAR; INTRAVENOUS EVERY 6 HOURS PRN
Status: DISCONTINUED | OUTPATIENT
Start: 2023-04-19 | End: 2023-04-20 | Stop reason: HOSPADM

## 2023-04-19 RX ORDER — SODIUM CHLORIDE 9 MG/ML
40 INJECTION, SOLUTION INTRAVENOUS AS NEEDED
Status: DISCONTINUED | OUTPATIENT
Start: 2023-04-19 | End: 2023-04-20 | Stop reason: HOSPADM

## 2023-04-19 RX ORDER — AMOXICILLIN 250 MG
2 CAPSULE ORAL 2 TIMES DAILY
Status: DISCONTINUED | OUTPATIENT
Start: 2023-04-19 | End: 2023-04-20 | Stop reason: HOSPADM

## 2023-04-19 RX ORDER — SODIUM CHLORIDE 0.9 % (FLUSH) 0.9 %
10 SYRINGE (ML) INJECTION AS NEEDED
Status: DISCONTINUED | OUTPATIENT
Start: 2023-04-19 | End: 2023-04-20 | Stop reason: HOSPADM

## 2023-04-19 RX ADMIN — METFORMIN HYDROCHLORIDE 500 MG: 500 TABLET, FILM COATED ORAL at 17:08

## 2023-04-19 RX ADMIN — Medication 10 ML: at 21:18

## 2023-04-19 RX ADMIN — SODIUM CHLORIDE 2 ML: 9 INJECTION INTRAMUSCULAR; INTRAVENOUS; SUBCUTANEOUS at 11:17

## 2023-04-19 RX ADMIN — DIGOXIN 250 MCG: 0.25 INJECTION INTRAMUSCULAR; INTRAVENOUS at 10:51

## 2023-04-19 RX ADMIN — APIXABAN 5 MG: 2.5 TABLET, FILM COATED ORAL at 10:37

## 2023-04-19 RX ADMIN — DIGOXIN 250 MCG: 0.25 INJECTION INTRAMUSCULAR; INTRAVENOUS at 13:04

## 2023-04-19 RX ADMIN — FUROSEMIDE 20 MG: 20 TABLET ORAL at 11:35

## 2023-04-19 RX ADMIN — APIXABAN 5 MG: 2.5 TABLET, FILM COATED ORAL at 21:17

## 2023-04-19 RX ADMIN — METOPROLOL TARTRATE 5 MG: 5 INJECTION INTRAVENOUS at 10:51

## 2023-04-19 RX ADMIN — Medication 10 ML: at 10:35

## 2023-04-19 NOTE — H&P
HISTORY AND PHYSICAL      Patient Care Team:  Kash Simpson MD as PCP - General (Family Medicine)    CHIEF COMPLAINT: New onset atrial flutter    HISTORY OF PRESENT ILLNESS:    72-year-old morbidly obese white male history of hypertension hyperlipidemia BPH impaired fasting glucose who presented to my office on 4/18/2022 for routine office visit.  During that exam was found to be new onset atrial flutter with with mild tachycardia in the low 100s.  Patient was already on a beta-blocker and this was increased to 100 mg from 50 and was started on Eliquis.  He called me this morning said his heart rate was in the 130s and therefore is being admitted for further evaluation treatment.  Patient has had extreme fatigue for the last 4 to 6 weeks and some exertional dyspnea.  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  No chest pain no palpitations    Past Medical History:   Diagnosis Date   • Arthritis    • Asthma    • Cardiac disease    • Coronary artery disease    • Diabetes mellitus     take Metformin   • Heart disease    • Hypertension    • IFG (impaired fasting glucose) 6/28/2021   • Knee sprain    • Mini stroke    • EVE (obstructive sleep apnea)    • Rotator cuff syndrome      Past Surgical History:   Procedure Laterality Date   • CHOLECYSTECTOMY WITH INTRAOPERATIVE CHOLANGIOGRAM N/A 6/25/2021    Procedure: CHOLECYSTECTOMY LAPAROSCOPIC INTRAOPERATIVE CHOLANGIOGRAM;  Surgeon: Chikis Wing MD;  Location: [facility] OR;  Service: General;  Laterality: N/A;   • HERNIA REPAIR  2007   • JOINT MANIPULATION Right 11/11/2019    Procedure: KNEE MANIPULATION;  Surgeon: Lopez Mejias MD;  Location: [facility] OR;  Service: Orthopedics   • KNEE SURGERY  1970   • TOTAL KNEE ARTHROPLASTY Right 9/4/2019    Procedure: TOTAL KNEE ARTHROPLASTY AND ALL ASSOCIATED PROCEDURES;  Surgeon: Lopez Mejias MD;  Location: [facility] OR;  Service:  Orthopedics     Family History   Problem Relation Age of Onset   • Hypertension Father    • Heart disease Father    • Stroke Father      Social History     Tobacco Use   • Smoking status: Never   • Smokeless tobacco: Never   • Tobacco comments:     caffeine use   Vaping Use   • Vaping Use: Never used   Substance Use Topics   • Alcohol use: Never   • Drug use: No     Medications Prior to Admission   Medication Sig Dispense Refill Last Dose   • allopurinol (ZYLOPRIM) 300 MG tablet 1 tablet Every Night.   4/18/2023   • aspirin 81 MG tablet Take 1 tablet by mouth Daily.   4/19/2023   • cetirizine (zyrTEC) 10 MG tablet Take 1 tablet by mouth Daily.   4/19/2023   • doxazosin (CARDURA) 4 MG tablet Take 1 tablet by mouth 2 (Two) Times a Day.   4/19/2023   • fenofibrate (TRICOR) 145 MG tablet Take  by mouth Daily.   4/19/2023   • ipratropium-albuterol (COMBIVENT RESPIMAT)  MCG/ACT inhaler Inhale 1 puff 4 (Four) Times a Day As Needed for Wheezing.   Past Month   • metFORMIN (GLUCOPHAGE) 500 MG tablet Take 1 tablet by mouth 2 (Two) Times a Day With Meals.   4/19/2023   • metoprolol succinate XL (TOPROL-XL) 50 MG 24 hr tablet Take 2 tablets by mouth Daily.   4/19/2023   • pantoprazole (PROTONIX) 40 MG EC tablet Take 1 tablet by mouth Daily.   4/19/2023   • valsartan-hydrochlorothiazide (DIOVAN-HCT) 320-25 MG per tablet Take 1 tablet by mouth Daily.   4/19/2023   • furosemide (LASIX) 20 MG tablet Take 1 tablet by mouth Daily As Needed.   More than a month     Allergies:  Penicillins     Review of Systems   Constitutional: Positive for fatigue. Negative for activity change and appetite change.   HENT: Negative for congestion.    Respiratory: Positive for shortness of breath (Exertional shortness of breath). Negative for cough, chest tightness and wheezing.    Cardiovascular: Positive for leg swelling (Intermittent). Negative for chest pain and palpitations.   Gastrointestinal: Negative for abdominal distention, abdominal  "pain, diarrhea, nausea and vomiting.   Endocrine: Negative for polyphagia and polyuria.   Genitourinary: Negative for frequency.   Skin: Negative for rash.   Neurological: Negative for dizziness and light-headedness.   Hematological: Does not bruise/bleed easily.   Psychiatric/Behavioral: Negative for agitation and behavioral problems.       Vital Signs  Temp:  [97.7 °F (36.5 °C)] 97.7 °F (36.5 °C)  Heart Rate:  [] 82  Resp:  [16] 16  BP: (107-124)/(83-92) 116/85  Oxygen Therapy  SpO2: 92 %  Pulse Oximetry Type: Continuous  Device (Oxygen Therapy): room air}  Body mass index is 37.3 kg/m².  Flowsheet Rows    Flowsheet Row First Filed Value   Admission Height 72 cm (28.35\") Documented at 04/19/2023 1013   Admission Weight 125 kg (275 lb 9.6 oz) Documented at 04/19/2023 1040                 Physical Exam  Vitals and nursing note reviewed.   Constitutional:       Appearance: He is well-developed. He is morbidly obese.   HENT:      Head: Normocephalic.   Eyes:      Conjunctiva/sclera: Conjunctivae normal.   Neck:      Thyroid: No thyromegaly.      Vascular: No JVD.   Cardiovascular:      Rate and Rhythm: Tachycardia present. Rhythm regularly irregular.      Heart sounds: Normal heart sounds. No murmur heard.  Pulmonary:      Effort: Pulmonary effort is normal. No respiratory distress.      Breath sounds: Normal breath sounds. No wheezing or rales.   Abdominal:      General: Bowel sounds are normal. There is no distension.      Palpations: Abdomen is soft.      Tenderness: There is no abdominal tenderness. There is no guarding.   Skin:     General: Skin is warm and dry.      Findings: No rash.   Neurological:      General: No focal deficit present.      Mental Status: He is alert and oriented to person, place, and time. Mental status is at baseline.      Cranial Nerves: Cranial nerves 2-12 are intact.      Motor: Motor function is intact.          Debilities/Disabilities Identified: None    Emotional Behavior: " Appropriate    Result Review        I have personally reviewed the results from the time of this admission to 4/19/2023 12:53 EDT and agree with these findings:  [x]  Laboratory  []  Microbiology  []  Radiology  [x]  EKG/Telemetry   []  Cardiology/Vascular   []  Pathology  []  Old records  []  Other:          Results Review:    I reviewed the patient's new clinical results.  Lab Results (most recent)     Procedure Component Value Units Date/Time    BNP [[id number]]  (Normal) Collected: 04/19/23 1033    Specimen: Blood Updated: 04/19/23 1143     proBNP 137.1 pg/mL     Narrative:      Among patients with dyspnea, NT-proBNP is highly sensitive for the detection of acute congestive heart failure. In addition NT-proBNP of <300 pg/ml effectively rules out acute congestive heart failure with 99% negative predictive value.      TSH [[id number]]  (Normal) Collected: 04/19/23 1033    Specimen: Blood Updated: 04/19/23 1110     TSH 2.640 uIU/mL     Comprehensive Metabolic Panel [[id number]]  (Abnormal) Collected: 04/19/23 1033    Specimen: Blood Updated: 04/19/23 1100     Glucose 112 mg/dL      BUN 20 mg/dL      Creatinine 1.13 mg/dL      Sodium 137 mmol/L      Potassium 4.2 mmol/L      Chloride 100 mmol/L      CO2 24.8 mmol/L      Calcium 9.9 mg/dL      Total Protein 6.9 g/dL      Albumin 4.5 g/dL      ALT (SGPT) 49 U/L      AST (SGOT) 40 U/L      Alkaline Phosphatase 72 U/L      Total Bilirubin 0.5 mg/dL      Globulin 2.4 gm/dL      A/G Ratio 1.9 g/dL      BUN/Creatinine Ratio 17.7     Anion Gap 12.2 mmol/L      eGFR 69.9 mL/min/1.73     Narrative:      GFR Normal >60  Chronic Kidney Disease <60  Kidney Failure <15      Magnesium [[id number]]  (Normal) Collected: 04/19/23 1033    Specimen: Blood Updated: 04/19/23 1100     Magnesium 1.7 mg/dL     CBC (No Diff) [[id number]]  (Normal) Collected: 04/19/23 1033    Specimen: Blood Updated: 04/19/23 1039     WBC 5.58 10*3/mm3      RBC 4.69 10*6/mm3      Hemoglobin 13.7 g/dL       Hematocrit 41.6 %      MCV 88.7 fL      MCH 29.2 pg      MCHC 32.9 g/dL      RDW 14.8 %      RDW-SD 49.3 fl      MPV 9.6 fL      Platelets 210 10*3/mm3           Imaging Results (Most Recent)     None        reviewed    ECG/EMG Results (most recent)     Procedure Component Value Units Date/Time    ECG 12 Lead Rhythm Change [[id number]] Collected: 04/19/23 1113     Updated: 04/19/23 1114     QT Interval 417 ms     Narrative:      HEART RATE= 84  bpm  RR Interval= 717  ms  MS Interval=   ms  P Horizontal Axis=   deg  P Front Axis=   deg  QRSD Interval= 146  ms  QT Interval= 417  ms  QRS Axis= -62  deg  T Wave Axis= 27  deg  - ABNORMAL ECG -  Atrial flutter with predominant 3:1 AV block  Right bundle branch block  Inferior infarct, old  Electronically Signed By:   Date and Time of Study: 2023-04-19 11:13:07    Adult Transthoracic Echo Complete w/ Color, Spectral and Contrast if Necessary Per Protocol [[id number]] Resulted: 04/19/23 1200     Updated: 04/19/23 1208     Target HR (85%) 128 bpm      Max. Pred. HR (100%) 150 bpm      EF(MOD-bp) 62.3 %      LVIDd 5.0 cm      LVIDs 3.7 cm      IVSd 1.20 cm      LVPWd 1.20 cm      FS 25.2 %      IVS/LVPW 1.00 cm      ESV(cubed) 52.3 ml      LV Sys Vol (BSA corrected) 21.0 cm2      EDV(cubed) 125.0 ml      LV Arment[place] Vol (BSA corrected) 59.6 cm2      LVOT area 3.6 cm2      LV mass(C)d 233.7 grams      LVOT diam 2.15 cm      EDV(MOD-sp2) 122.0 ml      EDV(MOD-sp4) 145.0 ml      ESV(MOD-sp2) 48.0 ml      ESV(MOD-sp4) 51.0 ml      SV(MOD-sp2) 74.0 ml      SV(MOD-sp4) 94.0 ml      SI(MOD-sp2) 30.4 ml/m2      SI(MOD-sp4) 38.7 ml/m2      EF(MOD-sp2) 60.7 %      EF(MOD-sp4) 64.8 %      MV E max papa 112.1 cm/sec      MV dec time 0.18 msec      LA ESV Index (BP) 26.7 ml/m2      Med Peak E' Papa 8.9 cm/sec      Lat Peak E' Papa 9.9 cm/sec      Avg E/e' ratio 11.93     SV(LVOT) 78.8 ml      SV(RVOT) 57.5 ml      Qp/Qs 0.73     RV S' 17.3 cm/sec      LV V1 max 122.0 cm/sec      LV V1 max PG 6.0  mmHg      LV V1 mean PG 3.0 mmHg      LV V1 VTI 21.7 cm      Ao pk don 141.0 cm/sec      Ao max PG 8.0 mmHg      Ao mean PG 4.0 mmHg      Ao V2 VTI 21.9 cm      PAMELA(I,D) 3.6 cm2      MV max PG 6.9 mmHg      MV mean PG 3.6 mmHg      MV V2 VTI 22.4 cm      MV P1/2t 57.4 msec      MVA(P1/2t) 3.8 cm2      MVA(VTI) 3.5 cm2      MV dec slope 695.8 cm/sec2      MR max don 319.2 cm/sec      MR max PG 40.8 mmHg      TR max don 247.0 cm/sec      TR max PG 24.4 mmHg      RVSP(TR) 27.4 mmHg      RAP systole 3.0 mmHg      RVOT diam 2.12 cm      RV V1 max PG 3.0 mmHg      RV V1 max 86.5 cm/sec      RV V1 VTI 16.3 cm      PA V2 max 99.5 cm/sec      Ao root diam 3.6 cm      ACS 1.73 cm      Sinus 3.1 cm      Dimensionless Index 1.00 (DI)     Narrative:      •  Left ventricular systolic function is normal. Calculated left   ventricular EF = 62.3%  •  Left ventricular diastolic function was normal.  •  Mild mitral valve regurgitation is present.          reviewed    Assessment & Plan       .  New onset atrial flutter with rapid ventricular response patient was given 1 dose of p.o. digoxin and IV metoprolol his heart rate has improved.  He is already taken 100 mg Toprol-XL this morning we will resume in a.m. we will hold his p.o. alpha-blocker as his blood pressure is on the low side.  His exertional dyspnea and fatigue is likely due to his a flutter we will get a stress test.  His [id number] is 2 anticoagulation will be initiated.  He had been prescribed Eliquis but had not picked up his prescription because it needed prior authorization    .  Hypertension well controlled nothing acute continue with home medications adjustments as above    .  Hyperlipidemia stable nothing acute continue home statin    .  Impaired fasting glucose stable continue metformin      .Allergic rhinitis with reactive airways disease stable    .  Morbid obesity          I discussed the patients findings and my recommendations with patient     Kash  MD Calvin  04/19/23  12:53 EDT    Total critical care time was 35  minutes, excluding any separately billable procedure time     Much of this encounter note is an electronic transcription/translation of spoken language to printed text. The electronic translation of spoken language may permit erroneous, or at times, nonsensical words or phrases to be inadvertently transcribed; Although I have reviewed the note for such errors, some may still exist.    Note Disclaimer: At [facility], we believe that sharing information builds trust and better relationships. You are receiving this note because you recently visited [facility]. It is possible you will see health information before a provider has talked with you about it. This kind of information can be easy to misunderstand. To help you fully understand what it means for your health, we urge you to discuss this note with your provider.

## 2023-04-19 NOTE — PLAN OF CARE
Goal Outcome Evaluation:  Plan of Care Reviewed With: patient, spouse           Outcome Evaluation: Pt came to ICU as a direct admit.  RA.  95%  Afib, HR 80-90's.  Started on Eliquis.  UOP more than adequate this shift.

## 2023-04-20 ENCOUNTER — APPOINTMENT (OUTPATIENT)
Dept: CARDIOLOGY | Facility: HOSPITAL | Age: 73
End: 2023-04-20
Payer: MEDICARE

## 2023-04-20 VITALS
SYSTOLIC BLOOD PRESSURE: 106 MMHG | RESPIRATION RATE: 18 BRPM | HEART RATE: 74 BPM | HEIGHT: 72 IN | WEIGHT: 275 LBS | TEMPERATURE: 97.8 F | DIASTOLIC BLOOD PRESSURE: 85 MMHG | OXYGEN SATURATION: 93 % | BODY MASS INDEX: 37.25 KG/M2

## 2023-04-20 PROBLEM — I48.92 ATRIAL FLUTTER: Status: ACTIVE | Noted: 2023-04-20

## 2023-04-20 PROBLEM — I48.91 ATRIAL FIBRILLATION: Status: RESOLVED | Noted: 2023-04-19 | Resolved: 2023-04-20

## 2023-04-20 LAB
AORTIC DIMENSIONLESS INDEX: 1 (DI)
BH CV ECHO MEAS - ACS: 1.73 CM
BH CV ECHO MEAS - AO MAX PG: 8 MMHG
BH CV ECHO MEAS - AO MEAN PG: 4 MMHG
BH CV ECHO MEAS - AO ROOT DIAM: 3.6 CM
BH CV ECHO MEAS - AO V2 MAX: 141 CM/SEC
BH CV ECHO MEAS - AO V2 VTI: 21.9 CM
BH CV ECHO MEAS - AVA(I,D): 3.6 CM2
BH CV ECHO MEAS - EDV(CUBED): 125 ML
BH CV ECHO MEAS - EDV(MOD-SP2): 122 ML
BH CV ECHO MEAS - EDV(MOD-SP4): 145 ML
BH CV ECHO MEAS - EF(MOD-BP): 62.3 %
BH CV ECHO MEAS - EF(MOD-SP2): 60.7 %
BH CV ECHO MEAS - EF(MOD-SP4): 64.8 %
BH CV ECHO MEAS - ESV(CUBED): 52.3 ML
BH CV ECHO MEAS - ESV(MOD-SP2): 48 ML
BH CV ECHO MEAS - ESV(MOD-SP4): 51 ML
BH CV ECHO MEAS - FS: 25.2 %
BH CV ECHO MEAS - IVS/LVPW: 1 CM
BH CV ECHO MEAS - IVSD: 1.2 CM
BH CV ECHO MEAS - LAT PEAK E' VEL: 9.9 CM/SEC
BH CV ECHO MEAS - LV DIASTOLIC VOL/BSA (35-75): 59.6 CM2
BH CV ECHO MEAS - LV MASS(C)D: 233.7 GRAMS
BH CV ECHO MEAS - LV MAX PG: 6 MMHG
BH CV ECHO MEAS - LV MEAN PG: 3 MMHG
BH CV ECHO MEAS - LV SYSTOLIC VOL/BSA (12-30): 21 CM2
BH CV ECHO MEAS - LV V1 MAX: 122 CM/SEC
BH CV ECHO MEAS - LV V1 VTI: 21.7 CM
BH CV ECHO MEAS - LVIDD: 5 CM
BH CV ECHO MEAS - LVIDS: 3.7 CM
BH CV ECHO MEAS - LVOT AREA: 3.6 CM2
BH CV ECHO MEAS - LVOT DIAM: 2.15 CM
BH CV ECHO MEAS - LVPWD: 1.2 CM
BH CV ECHO MEAS - MED PEAK E' VEL: 8.9 CM/SEC
BH CV ECHO MEAS - MR MAX PG: 40.8 MMHG
BH CV ECHO MEAS - MR MAX VEL: 319.2 CM/SEC
BH CV ECHO MEAS - MV DEC SLOPE: 695.8 CM/SEC2
BH CV ECHO MEAS - MV DEC TIME: 0.18 MSEC
BH CV ECHO MEAS - MV E MAX VEL: 112.1 CM/SEC
BH CV ECHO MEAS - MV MAX PG: 6.9 MMHG
BH CV ECHO MEAS - MV MEAN PG: 3.6 MMHG
BH CV ECHO MEAS - MV P1/2T: 57.4 MSEC
BH CV ECHO MEAS - MV V2 VTI: 22.4 CM
BH CV ECHO MEAS - MVA(P1/2T): 3.8 CM2
BH CV ECHO MEAS - MVA(VTI): 3.5 CM2
BH CV ECHO MEAS - PA V2 MAX: 99.5 CM/SEC
BH CV ECHO MEAS - QP/QS: 0.73
BH CV ECHO MEAS - RAP SYSTOLE: 3 MMHG
BH CV ECHO MEAS - RV MAX PG: 3 MMHG
BH CV ECHO MEAS - RV V1 MAX: 86.5 CM/SEC
BH CV ECHO MEAS - RV V1 VTI: 16.3 CM
BH CV ECHO MEAS - RVOT DIAM: 2.12 CM
BH CV ECHO MEAS - RVSP: 27.4 MMHG
BH CV ECHO MEAS - SI(MOD-SP2): 30.4 ML/M2
BH CV ECHO MEAS - SI(MOD-SP4): 38.7 ML/M2
BH CV ECHO MEAS - SV(LVOT): 78.8 ML
BH CV ECHO MEAS - SV(MOD-SP2): 74 ML
BH CV ECHO MEAS - SV(MOD-SP4): 94 ML
BH CV ECHO MEAS - SV(RVOT): 57.5 ML
BH CV ECHO MEAS - TR MAX PG: 24.4 MMHG
BH CV ECHO MEAS - TR MAX VEL: 247 CM/SEC
BH CV ECHO MEASUREMENTS AVERAGE E/E' RATIO: 11.93
BH CV XLRA - TDI S': 17.3 CM/SEC
CHOLEST SERPL-MCNC: 165 MG/DL (ref 0–200)
HDLC SERPL-MCNC: 40 MG/DL (ref 40–60)
LDLC SERPL CALC-MCNC: 91 MG/DL (ref 0–100)
LDLC/HDLC SERPL: 2.14 {RATIO}
LEFT ATRIUM VOLUME INDEX: 26.7 ML/M2
MAXIMAL PREDICTED HEART RATE: 150 BPM
SINUS: 3.1 CM
STRESS TARGET HR: 128 BPM
TRIGL SERPL-MCNC: 197 MG/DL (ref 0–150)
TROPONIN T SERPL HS-MCNC: 16 NG/L
VLDLC SERPL-MCNC: 34 MG/DL (ref 5–40)

## 2023-04-20 PROCEDURE — G0378 HOSPITAL OBSERVATION PER HR: HCPCS

## 2023-04-20 PROCEDURE — 93225 XTRNL ECG REC<48 HRS REC: CPT

## 2023-04-20 PROCEDURE — 99222 1ST HOSP IP/OBS MODERATE 55: CPT | Performed by: INTERNAL MEDICINE

## 2023-04-20 PROCEDURE — 93226 XTRNL ECG REC<48 HR SCAN A/R: CPT

## 2023-04-20 RX ORDER — DILTIAZEM HYDROCHLORIDE 120 MG/1
120 CAPSULE, COATED, EXTENDED RELEASE ORAL
Qty: 30 CAPSULE | Refills: 0 | Status: SHIPPED | OUTPATIENT
Start: 2023-04-21

## 2023-04-20 RX ORDER — ATORVASTATIN CALCIUM 20 MG/1
20 TABLET, FILM COATED ORAL NIGHTLY
Status: DISCONTINUED | OUTPATIENT
Start: 2023-04-20 | End: 2023-04-20 | Stop reason: HOSPADM

## 2023-04-20 RX ORDER — ATORVASTATIN CALCIUM 20 MG/1
20 TABLET, FILM COATED ORAL NIGHTLY
Qty: 90 TABLET | Refills: 0 | Status: SHIPPED | OUTPATIENT
Start: 2023-04-20

## 2023-04-20 RX ORDER — METOPROLOL TARTRATE 50 MG/1
100 TABLET, FILM COATED ORAL EVERY 12 HOURS SCHEDULED
Status: DISCONTINUED | OUTPATIENT
Start: 2023-04-20 | End: 2023-04-20

## 2023-04-20 RX ORDER — DILTIAZEM HYDROCHLORIDE 120 MG/1
120 CAPSULE, COATED, EXTENDED RELEASE ORAL
Qty: 30 CAPSULE | Refills: 0 | Status: SHIPPED | OUTPATIENT
Start: 2023-04-21 | End: 2023-04-20 | Stop reason: SDUPTHER

## 2023-04-20 RX ORDER — DILTIAZEM HYDROCHLORIDE 120 MG/1
120 CAPSULE, COATED, EXTENDED RELEASE ORAL
Status: DISCONTINUED | OUTPATIENT
Start: 2023-04-20 | End: 2023-04-20 | Stop reason: HOSPADM

## 2023-04-20 RX ORDER — ATORVASTATIN CALCIUM 20 MG/1
20 TABLET, FILM COATED ORAL NIGHTLY
Qty: 90 TABLET | Refills: 0 | Status: SHIPPED | OUTPATIENT
Start: 2023-04-20 | End: 2023-04-20 | Stop reason: SDUPTHER

## 2023-04-20 RX ORDER — METOPROLOL TARTRATE 50 MG/1
50 TABLET, FILM COATED ORAL EVERY 12 HOURS SCHEDULED
Status: DISCONTINUED | OUTPATIENT
Start: 2023-04-20 | End: 2023-04-20

## 2023-04-20 RX ADMIN — PANTOPRAZOLE SODIUM 40 MG: 40 TABLET, DELAYED RELEASE ORAL at 08:52

## 2023-04-20 RX ADMIN — METOPROLOL TARTRATE 100 MG: 50 TABLET, FILM COATED ORAL at 08:51

## 2023-04-20 RX ADMIN — Medication 10 ML: at 08:53

## 2023-04-20 RX ADMIN — DILTIAZEM HYDROCHLORIDE 120 MG: 120 CAPSULE, COATED, EXTENDED RELEASE ORAL at 08:51

## 2023-04-20 RX ADMIN — ALLOPURINOL 300 MG: 300 TABLET ORAL at 08:51

## 2023-04-20 RX ADMIN — CETIRIZINE HYDROCHLORIDE 10 MG: 10 TABLET, FILM COATED ORAL at 08:51

## 2023-04-20 RX ADMIN — METFORMIN HYDROCHLORIDE 500 MG: 500 TABLET, FILM COATED ORAL at 08:52

## 2023-04-20 RX ADMIN — FENOFIBRATE 145 MG: 145 TABLET ORAL at 08:51

## 2023-04-20 NOTE — CONSULTS
Date of Hospital Visit: 2023  Date of consult:   Encounter Provider: Fernando uRffin MD  Place of Service: [facility] CARDIOLOGY  Patient Name: Jefry Sabillon  :1950  Referral Provider: Cortez Simpson*    Chief complaint: fatigue/palpitation    Reason for consult: Atrial flutter with RVR    History of Present Illness    Mr. Araujo is a 73 yo man with admitted with diagnosis of atrial flutter with rapid ventricular response.  He was noted to be in atrial flutter with RVR during routine visit with Dr Simpson on 2023 and started on Eliquis and dose of beta-blocker increased.  He called back yesterday with elevated heart rate.  He reports having intermittent palpitations for several weeks but not usually bad and last only for 5 minutes.  He reports feeling increasingly fatigued for the past 1-2 months.  He denies any rest or exertional chest discomfort, orthopnea, PND, extremity swelling, presyncope or syncope.    Rate control regimen started since after admission and he feels better this morning.    No significant acute events overnight        Past Medical History:   Diagnosis Date   • Arthritis    • Asthma    • Atrial flutter 2023   • Cardiac disease    • Coronary artery disease    • Diabetes mellitus     take Metformin   • Heart disease    • Hypertension    • IFG (impaired fasting glucose) 2021   • Knee sprain    • Mini stroke    • EVE (obstructive sleep apnea)    • Rotator cuff syndrome        Past Surgical History:   Procedure Laterality Date   • CHOLECYSTECTOMY WITH INTRAOPERATIVE CHOLANGIOGRAM N/A 2021    Procedure: CHOLECYSTECTOMY LAPAROSCOPIC INTRAOPERATIVE CHOLANGIOGRAM;  Surgeon: Chikis Wing MD;  Location: [facility] OR;  Service: General;  Laterality: N/A;   • HERNIA REPAIR     • JOINT MANIPULATION Right 2019    Procedure: KNEE MANIPULATION;  Surgeon: Lopez Mejias MD;  Location: [facility] OR;  Service: Orthopedics    • KNEE SURGERY  1970   • TOTAL KNEE ARTHROPLASTY Right 9/4/2019    Procedure: TOTAL KNEE ARTHROPLASTY AND ALL ASSOCIATED PROCEDURES;  Surgeon: Lopez Mejias MD;  Location: [facility];  Service: Orthopedics       Medications Prior to Admission   Medication Sig Dispense Refill Last Dose   • allopurinol (ZYLOPRIM) 300 MG tablet 1 tablet Every Night.   4/18/2023   • aspirin 81 MG tablet Take 1 tablet by mouth Daily.   4/19/2023   • cetirizine (zyrTEC) 10 MG tablet Take 1 tablet by mouth Daily.   4/19/2023   • doxazosin (CARDURA) 4 MG tablet Take 1 tablet by mouth 2 (Two) Times a Day.   4/19/2023   • fenofibrate (TRICOR) 145 MG tablet Take  by mouth Daily.   4/19/2023   • ipratropium-albuterol (COMBIVENT RESPIMAT)  MCG/ACT inhaler Inhale 1 puff 4 (Four) Times a Day As Needed for Wheezing.   Past Month   • metFORMIN (GLUCOPHAGE) 500 MG tablet Take 1 tablet by mouth 2 (Two) Times a Day With Meals.   4/19/2023   • metoprolol succinate XL (TOPROL-XL) 50 MG 24 hr tablet Take 2 tablets by mouth Daily.   4/19/2023   • pantoprazole (PROTONIX) 40 MG EC tablet Take 1 tablet by mouth Daily.   4/19/2023   • valsartan-hydrochlorothiazide (DIOVAN-HCT) 320-25 MG per tablet Take 1 tablet by mouth Daily.   4/19/2023   • furosemide (LASIX) 20 MG tablet Take 1 tablet by mouth Daily As Needed.   More than a month       Current Meds  Scheduled Meds:allopurinol, 300 mg, Oral, Daily  apixaban, 5 mg, Oral, Q12H  cetirizine, 10 mg, Oral, Daily  fenofibrate, 145 mg, Oral, Daily  valsartan, 320 mg, Oral, Q24H   And  hydroCHLOROthiazide, 25 mg, Oral, Q24H  metFORMIN, 500 mg, Oral, BID With Meals  metoprolol succinate XL, 100 mg, Oral, Daily  pantoprazole, 40 mg, Oral, Daily  senna-docusate sodium, 2 tablet, Oral, BID  sodium chloride, 10 mL, Intravenous, Q12H      Continuous Infusions:   PRN Meds:.•  acetaminophen **OR** acetaminophen  •  senna-docusate sodium **AND** polyethylene glycol **AND** bisacodyl **AND** bisacodyl  •   "ondansetron **OR** ondansetron  •  sodium chloride  •  sodium chloride    Allergies as of 04/19/2023 - Reviewed 04/19/2023   Allergen Reaction Noted   • Penicillins Dermatitis 06/28/2016       Social History     Socioeconomic History   • Marital status:    Tobacco Use   • Smoking status: Never   • Smokeless tobacco: Never   • Tobacco comments:     caffeine use   Vaping Use   • Vaping Use: Never used   Substance and Sexual Activity   • Alcohol use: Never   • Drug use: No   • Sexual activity: Yes       Family History   Problem Relation Age of Onset   • Hypertension Father    • Heart disease Father    • Stroke Father        REVIEW OF SYSTEMS:   All systems reviewed and pertinent positives include in HPI otherwise negative review of systems.       Objective:   Temp:  [97.2 °F (36.2 °C)-97.8 °F (36.6 °C)] 97.8 °F (36.6 °C)  Heart Rate:  [] 93  Resp:  [16-18] 18  BP: (102-146)/(60-95) 146/90  Body mass index is 37.3 kg/m².  Flowsheet Rows    Flowsheet Row First Filed Value   Admission Height 72 cm (28.35\") Documented at 04/19/2023 1013   Admission Weight 125 kg (275 lb 9.6 oz) Documented at 04/19/2023 1040        Vitals:    04/20/23 0755   BP: 146/90   Pulse: 93   Resp: 18   Temp: 97.8 °F (36.6 °C)   SpO2: 95%       General Appearance:    Alert, cooperative, in no acute distress   Head:    Normocephalic, without obvious abnormality, atraumatic   Eyes:            Lids and lashes normal, conjunctivae and sclerae normal, no   icterus, no pallor, corneas clear, PERRLA   Ears:    Ears appear intact with no abnormalities noted   Throat:   No oral lesions, no thrush, oral mucosa moist   Neck:   No adenopathy, supple, trachea midline, no thyromegaly, no   carotid bruit, no JVD   Back:     No kyphosis present, no scoliosis present, no skin lesions, erythema or scars, no tenderness to percussion or palpation, range of motion normal   Lungs:     Clear to auscultation,respirations regular, even and unlabored    Heart:    " Regular rhythm and normal rate, normal S1 and S2, no murmur, no gallop, no rub, no click   Chest Wall:    No abnormalities observed   Abdomen:     Normal bowel sounds, no masses, no organomegaly, soft nontender, nondistended, no guarding, no rebound  tenderness   Extremities:   Moves all extremities well, no edema, no cyanosis, no redness   Pulses:   Pulses palpable and equal bilaterally. Normal radial, carotid, femoral, dorsalis pedis and posterior tibial pulses bilaterally. Normal abdominal aorta   Skin:  Neurology:   Psychiatric:   No bleeding, bruising or rash   Normal speech and cranial nerve exam, no focal deficit   Alert and oriented x 3, normal mood and affect             Review of Data:      Results from last 7 days   Lab Units 04/19/23  1033   SODIUM mmol/L 137   POTASSIUM mmol/L 4.2   CHLORIDE mmol/L 100   CO2 mmol/L 24.8   BUN mg/dL 20   CREATININE mg/dL 1.13   CALCIUM mg/dL 9.9   BILIRUBIN mg/dL 0.5   ALK PHOS U/L 72   ALT (SGPT) U/L 49*   AST (SGOT) U/L 40   GLUCOSE mg/dL 112*     Results from last 7 days   Lab Units 04/19/23  1033   HSTROP T ng/L 16*     @LABRCNTbnp@  Results from last 7 days   Lab Units 04/19/23  1033   WBC 10*3/mm3 5.58   HEMOGLOBIN g/dL 13.7   HEMATOCRIT % 41.6   PLATELETS 10*3/mm3 210         Results from last 7 days   Lab Units 04/19/23  1033   MAGNESIUM mg/dL 1.7     @LABRCNTIP(chol,trig,hdl,ldl)    I personally viewed and interpreted the patient's EKG/Telemetry data  )   Atrial flutter        Assessment and Plan:  Mr Sabillon is a 73 yo man with prior medical hx of pericarditis(2018), hypertension, diastolic function, hyperlipidemia, obesity/EVE on cpaap, degenerative joint disease and knee surgery admitted with new onset atrial flutter with rapid ventricular response.  Duration of atrial flutter unknown but he reports intermittent palpitations for 1-2 months now.  He also reports increased exertional fatigue and palpitations of similar duration.  No other heart failure symptoms.   Denies rest or exertional chest pain.    1. Newly diagnosed typical atrial flutter with rapid ventricular response-on metoprolol extended release 50 mg daily at home which was increased 2 days ago to 100mg.  He was given 2 dose of digoxin 250 mcg IV and metoprolol 5 mg IV x1 yesterday evening with good heart rate response.  Eliquis continued. Echocardiogram unremarkable.  He has a normal myocardial perfusion study in 2018 and currently does not have clinical evidence for CAD.   2. Essential hypertension on valsartan/hydrochlorothiazide  3. Hypertriglyceridemia on fenofibrate  4. Type 2 diabetes mellitus  5. Obesity with EVE on CPAP machine    Optimize heart rate control with short acting metoprolol and I have added diltiazem.  Hold valsartan/hydrochlorothiazide to give more room for AV birgit blockers.  Continue anticoagulation.  Patient not significantly symptomatic during exam today.  Can be discharged home with plan for outpatient cardioversion after adequate anticoagulation.  I have discussed patient with Dr Simpson and his nurse.    Thank you for consulting with cardiology and allowing me to participate in care of this patient.     Fernando Ruffin MD  04/20/23  08:01 EDT.      Time spent in reviewing chart, discussion and examination:

## 2023-04-20 NOTE — NURSING NOTE
Spoke with Dr. Ruffin and notified him that patients HR is down in the 40s and 50s after receiving new/changed meds this AM. Pt asymptomatic.  He stated that he will adjust doses.

## 2023-04-20 NOTE — DISCHARGE INSTR - APPOINTMENTS
You have a follow up appointment scheduled with Dr. Simpson in his office on Monday April 24, 2023 at 1:00 pm. [phone]    Please call and make a follow up cardiology appointment with Dr. Ruffin, for 4 weeks after you are discharged from the hospital for possible cardioversion and/or ablation. [phone]

## 2023-04-20 NOTE — PROGRESS NOTES
"Daily Progress Note:      Chief complaint: A flutter, hypertension, hyperlipidemia, impaired fasting glucose    Subjective: No overnight complaints.  Remains in atrial flutter but rate is controlled.     LOS: 0 days     Vital Signs  Temp:  [97.2 °F (36.2 °C)-97.8 °F (36.6 °C)] 97.2 °F (36.2 °C)  Heart Rate:  [] 85  Resp:  [16-18] 16  BP: (102-133)/(60-95) 102/76  Oxygen Therapy  SpO2: 92 %  Pulse Oximetry Type: Continuous  Device (Oxygen Therapy): room air}  Body mass index is 37.3 kg/m².  Flowsheet Rows    Flowsheet Row First Filed Value   Admission Height 72 cm (28.35\") Documented at 04/19/2023 1013   Admission Weight 125 kg (275 lb 9.6 oz) Documented at 04/19/2023 1040                   Documented weights    04/19/23 1040 04/19/23 1115   Weight: 125 kg (275 lb 9.6 oz) 125 kg (275 lb)           Patient Vitals for the past 24 hrs:   BP Temp Temp src Pulse Resp SpO2 Height Weight   04/20/23 0600 102/76 -- -- 85 -- 92 % -- --   04/20/23 0500 104/75 -- -- 82 -- 92 % -- --   04/20/23 0400 111/69 -- -- 66 -- 95 % -- --   04/20/23 0300 115/70 -- -- 65 -- 91 % -- --   04/20/23 0230 -- -- -- 70 -- 91 % -- --   04/20/23 0200 106/71 -- -- 65 -- 92 % -- --   04/20/23 0130 -- -- -- 62 -- 93 % -- --   04/20/23 0100 104/72 -- -- 63 -- 92 % -- --   04/20/23 0030 -- -- -- 85 -- 97 % -- --   04/20/23 0000 123/78 97.2 °F (36.2 °C) Oral 92 16 95 % -- --   04/19/23 2330 -- -- -- 80 -- 95 % -- --   04/19/23 2300 110/73 -- -- 68 -- 95 % -- --   04/19/23 2230 -- -- -- 80 -- 97 % -- --   04/19/23 2200 118/72 -- -- 78 -- 94 % -- --   04/19/23 2130 -- -- -- 70 -- 92 % -- --   04/19/23 2030 -- -- -- 65 -- 96 % -- --   04/19/23 2000 131/76 -- -- 87 -- 95 % -- --   04/19/23 1900 111/77 -- -- 89 -- 95 % -- --   04/19/23 1830 119/74 -- -- 80 -- 95 % -- --   04/19/23 1800 113/68 -- -- 86 -- 93 % -- --   04/19/23 1730 133/86 -- -- 87 -- 95 % -- --   04/19/23 1700 117/95 -- -- 108 -- 96 % -- --   04/19/23 1630 121/81 -- -- 75 -- 95 % -- -- " "  04/19/23 1600 109/77 97.8 °F (36.6 °C) Oral 95 18 91 % -- --   04/19/23 1530 113/70 -- -- 87 -- 93 % -- --   04/19/23 1500 107/60 -- -- 87 -- 96 % -- --   04/19/23 1445 -- -- -- 88 -- 93 % -- --   04/19/23 1430 103/69 -- -- 87 -- 94 % -- --   04/19/23 1400 119/76 -- -- 92 -- 92 % -- --   04/19/23 1345 -- -- -- 92 -- 96 % -- --   04/19/23 1341 -- -- -- 87 -- 96 % -- --   04/19/23 1340 -- -- -- 107 -- 93 % -- --   04/19/23 1339 -- -- -- 100 -- 93 % -- --   04/19/23 1330 126/75 -- -- 109 -- 93 % -- --   04/19/23 1315 -- -- -- 103 -- 94 % -- --   04/19/23 1304 124/87 -- -- 90 -- -- -- --   04/19/23 1300 124/87 -- -- 86 -- 94 % -- --   04/19/23 1245 -- -- -- 96 -- 91 % -- --   04/19/23 1230 116/85 -- -- 82 -- 92 % -- --   04/19/23 1220 -- -- -- 88 -- 95 % -- --   04/19/23 1215 -- -- -- 84 -- 94 % -- --   04/19/23 1210 -- -- -- 96 -- 92 % -- --   04/19/23 1205 -- -- -- 84 -- 95 % -- --   04/19/23 1200 121/88 -- -- 85 16 96 % -- --   04/19/23 1155 -- -- -- 85 -- 93 % -- --   04/19/23 1150 -- -- -- 85 -- 94 % -- --   04/19/23 1145 -- -- -- 85 -- 92 % -- --   04/19/23 1140 -- -- -- 95 -- 94 % -- --   04/19/23 1135 -- -- -- 74 -- 96 % -- --   04/19/23 1130 124/84 -- -- 84 -- 93 % -- --   04/19/23 1125 -- -- -- 84 -- 93 % -- --   04/19/23 1120 -- -- -- 80 -- 93 % -- --   04/19/23 1115 107/92 -- -- 74 -- 95 % 182.9 cm (72\") 125 kg (275 lb)   04/19/23 1110 -- -- -- 74 -- 94 % -- --   04/19/23 1105 -- -- -- 82 -- 93 % -- --   04/19/23 1100 117/83 -- -- 97 -- 94 % -- --   04/19/23 1059 -- -- -- 91 -- 96 % -- --   04/19/23 1057 -- -- -- 91 -- 94 % -- --   04/19/23 1055 -- -- -- 93 -- 95 % -- --   04/19/23 1052 -- -- -- 97 -- 95 % -- --   04/19/23 1051 107/92 -- -- 96 -- -- -- --   04/19/23 1050 -- -- -- 95 -- 93 % -- --   04/19/23 1045 -- -- -- 99 -- 95 % -- --   04/19/23 1044 -- -- -- 98 -- 94 % -- --   04/19/23 1040 -- -- -- 100 -- 96 % -- 125 kg (275 lb 9.6 oz)   04/19/23 1035 -- -- -- (!) 130 -- 95 % -- --   04/19/23 1030 " "107/92 -- -- (!) 125 -- 96 % -- --   04/19/23 1025 -- -- -- (!) 131 -- 95 % -- --   04/19/23 1020 -- -- -- (!) 130 -- 93 % -- --   04/19/23 1016 -- -- -- (!) 128 -- 93 % -- --   04/19/23 1015 -- -- -- (!) 130 -- 92 % -- --   04/19/23 1014 -- -- -- (!) 129 -- 95 % -- --   04/19/23 1013 115/92 97.7 °F (36.5 °C) Oral (!) 131 16 92 % 72 cm (28.35\") --   04/19/23 1010 -- -- -- (!) 130 -- 93 % -- --       125 kg (275 lb)    Intake/Output                 04/19/23 0701 - 04/20/23 0700     [id number] Total              Intake    P.O.  720  -- 720    Total Intake 720 -- 720       Output    Urine  1800  650 2450    Total Output [phone]           Intake/Output Summary (Last 24 hours) at 4/20/2023 0720  Last data filed at 4/20/2023 0030  Gross per 24 hour   Intake 720 ml   Output 2450 ml   Net -1730 ml        Intake/Output Summary (Last 24 hours) at 4/20/2023 0720  Last data filed at 4/20/2023 0030  Gross per 24 hour   Intake 720 ml   Output 2450 ml   Net -1730 ml        Review of Systems   Constitutional: Negative for activity change, appetite change and fatigue.   HENT: Negative for congestion.    Respiratory: Negative for cough, chest tightness, shortness of breath and wheezing.    Cardiovascular: Negative for chest pain.   Gastrointestinal: Negative for abdominal distention, abdominal pain, diarrhea, nausea and vomiting.   Endocrine: Negative for polyphagia and polyuria.   Genitourinary: Negative for frequency.   Skin: Negative for rash.   Neurological: Negative for light-headedness.   Hematological: Does not bruise/bleed easily.   Psychiatric/Behavioral: Negative for agitation and behavioral problems.       Physical Exam  Vitals and nursing note reviewed.   Constitutional:       Appearance: He is well-developed. He is morbidly obese.   HENT:      Head: Normocephalic.   Eyes:      Conjunctiva/sclera: Conjunctivae normal.   Neck:      Thyroid: No thyromegaly.      Vascular: No JVD.   Cardiovascular:      Rate " and Rhythm: Normal rate. Rhythm irregularly irregular.      Heart sounds: Normal heart sounds. No murmur heard.  Pulmonary:      Effort: Pulmonary effort is normal. No respiratory distress.      Breath sounds: Normal breath sounds. No wheezing or rales.   Abdominal:      General: Bowel sounds are normal. There is no distension.      Palpations: Abdomen is soft.      Tenderness: There is no abdominal tenderness. There is no guarding.   Skin:     General: Skin is warm and dry.      Findings: No rash.   Neurological:      General: No focal deficit present.      Mental Status: He is alert.         Medication Review:   I have reviewed the patient's current medication list  Scheduled Meds:allopurinol, 300 mg, Oral, Daily  apixaban, 5 mg, Oral, Q12H  cetirizine, 10 mg, Oral, Daily  fenofibrate, 145 mg, Oral, Daily  valsartan, 320 mg, Oral, Q24H   And  hydroCHLOROthiazide, 25 mg, Oral, Q24H  metFORMIN, 500 mg, Oral, BID With Meals  metoprolol succinate XL, 100 mg, Oral, Daily  pantoprazole, 40 mg, Oral, Daily  senna-docusate sodium, 2 tablet, Oral, BID  sodium chloride, 10 mL, Intravenous, Q12H      Continuous Infusions:   PRN Meds:.•  acetaminophen **OR** acetaminophen  •  senna-docusate sodium **AND** polyethylene glycol **AND** bisacodyl **AND** bisacodyl  •  ondansetron **OR** ondansetron  •  sodium chloride  •  sodium chloride      Result Review        I have personally reviewed the results from the time of this admission to 4/20/2023 07:20 EDT and agree with these findings:  [x]  Laboratory  []  Microbiology  []  Radiology  [x]  EKG/Telemetry   []  Cardiology/Vascular   []  Pathology  []  Old records  []  Other:          Labs:  Results from last 7 days   Lab Units 04/19/23  1033   WBC 10*3/mm3 5.58   RBC 10*6/mm3 4.69   HEMOGLOBIN g/dL 13.7   HEMATOCRIT % 41.6   RDW % 14.8   MCV fL 88.7   MCH pg 29.2   MCHC g/dL 32.9   MPV fL 9.6   PLATELETS 10*3/mm3 210   RDW-SD fl 49.3       Results from last 7 days   Lab Units  04/19/23  1033   SODIUM mmol/L 137   POTASSIUM mmol/L 4.2   CHLORIDE mmol/L 100   CO2 mmol/L 24.8   BUN mg/dL 20   CREATININE mg/dL 1.13   CALCIUM mg/dL 9.9   BILIRUBIN mg/dL 0.5   ALK PHOS U/L 72   ALT (SGPT) U/L 49*   AST (SGOT) U/L 40   GLUCOSE mg/dL 112*     Results from last 7 days   Lab Units 04/19/23  1033   MAGNESIUM mg/dL 1.7       Results from last 7 days   Lab Units 04/19/23  1033   AST (SGOT) U/L 40   ALT (SGPT) U/L 49*   PLATELETS 10*3/mm3 210         Lab Results (last 24 hours)     Procedure Component Value Units Date/Time    BNP [[id number]]  (Normal) Collected: 04/19/23 1033    Specimen: Blood Updated: 04/19/23 1143     proBNP 137.1 pg/mL     Narrative:      Among patients with dyspnea, NT-proBNP is highly sensitive for the detection of acute congestive heart failure. In addition NT-proBNP of <300 pg/ml effectively rules out acute congestive heart failure with 99% negative predictive value.      TSH [[id number]]  (Normal) Collected: 04/19/23 1033    Specimen: Blood Updated: 04/19/23 1110     TSH 2.640 uIU/mL     Comprehensive Metabolic Panel [[id number]]  (Abnormal) Collected: 04/19/23 1033    Specimen: Blood Updated: 04/19/23 1100     Glucose 112 mg/dL      BUN 20 mg/dL      Creatinine 1.13 mg/dL      Sodium 137 mmol/L      Potassium 4.2 mmol/L      Chloride 100 mmol/L      CO2 24.8 mmol/L      Calcium 9.9 mg/dL      Total Protein 6.9 g/dL      Albumin 4.5 g/dL      ALT (SGPT) 49 U/L      AST (SGOT) 40 U/L      Alkaline Phosphatase 72 U/L      Total Bilirubin 0.5 mg/dL      Globulin 2.4 gm/dL      A/G Ratio 1.9 g/dL      BUN/Creatinine Ratio 17.7     Anion Gap 12.2 mmol/L      eGFR 69.9 mL/min/1.73     Narrative:      GFR Normal >60  Chronic Kidney Disease <60  Kidney Failure <15      Magnesium [[id number]]  (Normal) Collected: 04/19/23 1033    Specimen: Blood Updated: 04/19/23 1100     Magnesium 1.7 mg/dL     CBC (No Diff) [[id number]]  (Normal) Collected: 04/19/23 1033    Specimen: Blood Updated:  04/19/23 1039     WBC 5.58 10*3/mm3      RBC 4.69 10*6/mm3      Hemoglobin 13.7 g/dL      Hematocrit 41.6 %      MCV 88.7 fL      MCH 29.2 pg      MCHC 32.9 g/dL      RDW 14.8 %      RDW-SD 49.3 fl      MPV 9.6 fL      Platelets 210 10*3/mm3             Results from last 7 days   Lab Units 04/19/23  1033   TSH uIU/mL 2.640     Results from last 7 days   Lab Units 04/19/23  1033   PROBNP pg/mL 137.1         Results from last 7 days   Lab Units 04/19/23  1033   MAGNESIUM mg/dL 1.7                 No results found for: POCGLU                      Radiology:  Imaging Results (Last 24 Hours)     ** No results found for the last 24 hours. **          Cardiology:  ECG/EMG Results (last 24 hours)     Procedure Component Value Units Date/Time    Adult Transthoracic Echo Complete w/ Color, Spectral and Contrast if Necessary Per Protocol [[id number]] Resulted: 04/19/23 1200     Updated: 04/19/23 1208     Target HR (85%) 128 bpm      Max. Pred. HR (100%) 150 bpm      EF(MOD-bp) 62.3 %      LVIDd 5.0 cm      LVIDs 3.7 cm      IVSd 1.20 cm      LVPWd 1.20 cm      FS 25.2 %      IVS/LVPW 1.00 cm      ESV(cubed) 52.3 ml      LV Sys Vol (BSA corrected) 21.0 cm2      EDV(cubed) 125.0 ml      LV [place] Vol (BSA corrected) 59.6 cm2      LVOT area 3.6 cm2      LV mass(C)d 233.7 grams      LVOT diam 2.15 cm      EDV(MOD-sp2) 122.0 ml      EDV(MOD-sp4) 145.0 ml      ESV(MOD-sp2) 48.0 ml      ESV(MOD-sp4) 51.0 ml      SV(MOD-sp2) 74.0 ml      SV(MOD-sp4) 94.0 ml      SI(MOD-sp2) 30.4 ml/m2      SI(MOD-sp4) 38.7 ml/m2      EF(MOD-sp2) 60.7 %      EF(MOD-sp4) 64.8 %      MV E max papa 112.1 cm/sec      MV dec time 0.18 msec      LA ESV Index (BP) 26.7 ml/m2      Med Peak E' Papa 8.9 cm/sec      Lat Peak E' Papa 9.9 cm/sec      Avg E/e' ratio 11.93     SV(LVOT) 78.8 ml      SV(RVOT) 57.5 ml      Qp/Qs 0.73     RV S' 17.3 cm/sec      LV V1 max 122.0 cm/sec      LV V1 max PG 6.0 mmHg      LV V1 mean PG 3.0 mmHg      LV V1 VTI 21.7 cm      Ao pk papa  141.0 cm/sec      Ao max PG 8.0 mmHg      Ao mean PG 4.0 mmHg      Ao V2 VTI 21.9 cm      PAMELA(I,D) 3.6 cm2      MV max PG 6.9 mmHg      MV mean PG 3.6 mmHg      MV V2 VTI 22.4 cm      MV P1/2t 57.4 msec      MVA(P1/2t) 3.8 cm2      MVA(VTI) 3.5 cm2      MV dec slope 695.8 cm/sec2      MR max don 319.2 cm/sec      MR max PG 40.8 mmHg      TR max don 247.0 cm/sec      TR max PG 24.4 mmHg      RVSP(TR) 27.4 mmHg      RAP systole 3.0 mmHg      RVOT diam 2.12 cm      RV V1 max PG 3.0 mmHg      RV V1 max 86.5 cm/sec      RV V1 VTI 16.3 cm      PA V2 max 99.5 cm/sec      Ao root diam 3.6 cm      ACS 1.73 cm      Sinus 3.1 cm      Dimensionless Index 1.00 (DI)     Narrative:      •  Left ventricular systolic function is normal. Calculated left   ventricular EF = 62.3%  •  Left ventricular diastolic function was normal.  •  Mild mitral valve regurgitation is present.      ECG 12 Lead Rhythm Change [[id number]] Collected: 04/19/23 1113     Updated: 04/19/23 1742     QT Interval 417 ms     Narrative:      HEART RATE= 84  bpm  RR Interval= 717  ms  ND Interval=   ms  P Horizontal Axis=   deg  P Front Axis=   deg  QRSD Interval= 146  ms  QT Interval= 417  ms  QRS Axis= -62  deg  T Wave Axis= 27  deg  - ABNORMAL ECG -  Atrial flutter with predominant 3:1 AV block - new  Right bundle branch block  Inferior infarct, old  Electronically Signed By: Anna Sterling ([facility]) 19-Apr-2023 17:42:37  Date and Time of Study: 2023-04-19 11:13:07          I have reviewed recent labs results and consult notes.    Please note portions of this assessment/plan may have been copied and pasted, but I have personally seen this patient and reviewed each line of this assessment and plan for accuracy and made updates to reflect my necessary changes    Assessment and Plan:    .  New onset atrial flutter 3 1 block with a right bundle branch rate is much better controlled we will continue present management get cardiology patient consultation stress test today.      .  Hypertension blood pressure is on the low side may need to decrease his ARB we will see how he does with ambulation    .  Hyperlipidemia the acute continue home statin    .  Impaired fasting glucose stable        Much of this encounter note is an electronic transcription/translation of spoken language to printed text. The electronic translation of spoken language may permit erroneous, or at times, nonsensical words or phrases to be inadvertently transcribed; Although I have reviewed the note for such errors, some may still exist.    Note Disclaimer: At [facility], we believe that sharing information builds trust and better relationships. You are receiving this note because you recently visited [facility]. It is possible you will see health information before a provider has talked with you about it. This kind of information can be easy to misunderstand. To help you fully understand what it means for your health, we urge you to discuss this note with your provider.

## 2023-04-20 NOTE — PLAN OF CARE
Goal Outcome Evaluation:           Progress: no change  Outcome Evaluation: Remains in Atrial flutter. Rate 60's to 80's. Wore home cpap.  UOP good. No new issues.

## 2023-04-21 ENCOUNTER — READMISSION MANAGEMENT (OUTPATIENT)
Dept: CALL CENTER | Facility: HOSPITAL | Age: 73
End: 2023-04-21
Payer: MEDICARE

## 2023-04-21 NOTE — CASE MANAGEMENT/SOCIAL WORK
Case Management Discharge Note      Final Note: dc home         Selected Continued Care - Discharged on 4/20/2023 Admission date: 4/19/2023 - Discharge disposition: Home or Self Care    Destination    No services have been selected for the patient.              Durable Medical Equipment    No services have been selected for the patient.              Dialysis/Infusion    No services have been selected for the patient.              Home Medical Care    No services have been selected for the patient.              Therapy    No services have been selected for the patient.              Community Resources    No services have been selected for the patient.              Community & DME    No services have been selected for the patient.                       Final Discharge Disposition Code: 01 - home or self-care

## 2023-04-21 NOTE — OUTREACH NOTE
Prep Survey    Flowsheet Row Responses   [facility] facility patient discharged from? [place]   Is LACE score < 7 ? Yes   Eligibility Readm Mgmt   Discharge diagnosis new onset atrial flutter   Does the patient have one of the following disease processes/diagnoses(primary or secondary)? Other   Does the patient have Home health ordered? No   Is there a DME ordered? No   Prep survey completed? Yes          Dali KWONG - Registered Nurse

## 2023-04-22 NOTE — DISCHARGE SUMMARY
Jefry Sabillon  1950  [id number]        Discharge Summary    Date of Admission: 4/19/2023  Date of Discharge:   Primary Discharge Diagnoses:     New onset atrial flutter with rapid ventricular response    Secondary Discharge Diagnoses:     Hypertension  Hyperlipidemia   Impaired fasting glucose  Perennial allergic rhinitis and mild airways disease  Morbid obesity  PCP  Patient Care Team:  Kash Simpson MD as PCP - General (Family Medicine)    Consults:   Consults     No orders found from 3/21/2023 to 4/20/2023.            History of Present Illness:    72-year-old morbidly obese white male history of hypertension hyperlipidemia BPH impaired fasting glucose who presented to my office on 4/18/2022 for routine office visit.  During that exam was found to be new onset atrial flutter with with mild tachycardia in the low 100s.  Patient was already on a beta-blocker and this was increased to 100 mg from 50 and was started on Eliquis.  He called me this morning said his heart rate was in the 130s and therefore is being admitted for further evaluation treatment.  Patient has had extreme fatigue for the last 4 to 6 weeks and some exertional dyspnea.  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  No chest pain no palpitations    Hospital Course    Patient admitted to the ICU given 2 doses of IV metoprolol and 2 doses of IV digoxin with better rate control.  However his blood pressure is borderline systolics in the 110s.  Patient remained in atrial flutter.  Cardiology outpatient consultation medication adjustments were made but that could be discharged home for possible cardioversion about 4 to 6 weeks anticoagulation is to be continued his ARB diuretic and alpha-blocker were discontinued he was started on short acting beta-blocker with Cardizem which she was tolerating at time of discharge, short-term follow-up in my  office      Operations and Procedures Performed:       Adult Transthoracic Echo Complete w/ Color, Spectral and Contrast if Necessary Per Protocol    Addendum Date: 4/20/2023 Addendum:   •  Left ventricular systolic function is normal. Calculated left ventricular EF = 62.3% •  Left ventricular diastolic function was normal. •  Mild mitral valve regurgitation is present.     Result Date: 4/20/2023  Narrative: •  Left ventricular systolic function is normal. Calculated left ventricular EF = 62.3% •  Left ventricular diastolic function was normal. •  Mild mitral valve regurgitation is present.       Labs:  Results from last 7 days   Lab Units 04/19/23  1033   WBC 10*3/mm3 5.58   HEMOGLOBIN g/dL 13.7   HEMATOCRIT % 41.6   PLATELETS 10*3/mm3 210     Results from last 7 days   Lab Units 04/19/23  1033   SODIUM mmol/L 137   POTASSIUM mmol/L 4.2   CHLORIDE mmol/L 100   CO2 mmol/L 24.8   BUN mg/dL 20   CREATININE mg/dL 1.13   CALCIUM mg/dL 9.9   BILIRUBIN mg/dL 0.5   ALK PHOS U/L 72   ALT (SGPT) U/L 49*   AST (SGOT) U/L 40   GLUCOSE mg/dL 112*     Results from last 7 days   Lab Units 04/19/23  1033   MAGNESIUM mg/dL 1.7       Lab Results (last 24 hours)     Procedure Component Value Units Date/Time    Lipid Panel [[id number]]  (Abnormal) Collected: 04/19/23 1033    Specimen: Blood Updated: 04/20/23 0955     Total Cholesterol 165 mg/dL      Triglycerides 197 mg/dL      HDL Cholesterol 40 mg/dL      LDL Cholesterol  91 mg/dL      VLDL Cholesterol 34 mg/dL      LDL/HDL Ratio 2.14    Narrative:      Cholesterol Reference Ranges  (U.S. Department of Health and Human Services ATP III Classifications)    Desirable          <200 mg/dL  Borderline High    200-239 mg/dL  High Risk          >240 mg/dL      Triglyceride Reference Ranges  (U.S. Department of Health and Human Services ATP III Classifications)    Normal           <150 mg/dL  Borderline High  150-199 mg/dL  High             200-499 mg/dL  Very High        >500 mg/dL    HDL  Reference Ranges  (U.S. Department of Health and Human Services ATP III Classifications)    Low     <40 mg/dl (major risk factor for CHD)  High    >60 mg/dl ('negative' risk factor for CHD)        LDL Reference Ranges  (U.S. Department of Health and Human Services ATP III Classifications)    Optimal          <100 mg/dL  Near Optimal     100-129 mg/dL  Borderline High  130-159 mg/dL  High             160-189 mg/dL  Very High        >189 mg/dL    High Sensitivity Troponin T [[id number]]  (Abnormal) Collected: 04/19/23 1033    Specimen: Blood Updated: 04/20/23 0746     HS Troponin T 16 ng/L     Narrative:      High Sensitive Troponin T Reference Range:  <10.0 ng/L- Negative Female for AMI  <15.0 ng/L- Negative Male for AMI  >=10 - Abnormal Female indicating possible myocardial injury.  >=15 - Abnormal Male indicating possible myocardial injury.   Clinicians would have to utilize clinical acumen, EKG, Troponin, and serial changes to determine if it is an Acute Myocardial Infarction or myocardial injury due to an underlying chronic condition.             Results from last 7 days   Lab Units 04/19/23  1033   HSTROP T ng/L 16*     Results from last 7 days   Lab Units 04/19/23  1033   TSH uIU/mL 2.640     Results from last 7 days   Lab Units 04/19/23  1033   PROBNP pg/mL 137.1         Results from last 7 days   Lab Units 04/19/23  1033   MAGNESIUM mg/dL 1.7     Results from last 7 days   Lab Units 04/19/23  1033   CHOLESTEROL mg/dL 165   TRIGLYCERIDES mg/dL 197*   HDL CHOL mg/dL 40             No results found for: POCGLU                      Radiology:  Imaging Results (Last 24 Hours)     ** No results found for the last 24 hours. **          PROCEDURES          Allergies:  is allergic to penicillins.      Discharge Medications:     Your medication list      START taking these medications      Instructions Last Dose Given Next Dose Due   apixaban 5 MG tablet tablet  Commonly known as: ELIQUIS      Take 1 tablet by mouth 2  (Two) Times a Day.       atorvastatin 20 MG tablet  Commonly known as: LIPITOR      Take 1 tablet by mouth Every Night.       dilTIAZem  MG 24 hr capsule  Commonly known as: CARDIZEM CD  Start taking on: April 21, 2023      Take 1 capsule by mouth Daily.       metoprolol tartrate 25 MG tablet  Commonly known as: LOPRESSOR      Take 1 tablet by mouth Every 12 (Twelve) Hours.          CONTINUE taking these medications      Instructions Last Dose Given Next Dose Due   allopurinol 300 MG tablet  Commonly known as: ZYLOPRIM      1 tablet Every Night.       aspirin 81 MG tablet      Take 1 tablet by mouth Daily.       cetirizine 10 MG tablet  Commonly known as: zyrTEC      Take 1 tablet by mouth Daily.       fenofibrate 145 MG tablet  Commonly known as: TRICOR      Take 1 tablet by mouth Daily.       furosemide 20 MG tablet  Commonly known as: LASIX      Take 1 tablet by mouth Daily As Needed.       ipratropium-albuterol  MCG/ACT inhaler  Commonly known as: COMBIVENT RESPIMAT      Inhale 1 puff 4 (Four) Times a Day As Needed for Wheezing.       metFORMIN 500 MG tablet  Commonly known as: GLUCOPHAGE      Take 1 tablet by mouth 2 (Two) Times a Day With Meals.       pantoprazole 40 MG EC tablet  Commonly known as: PROTONIX      Take 1 tablet by mouth Daily.          STOP taking these medications    doxazosin 4 MG tablet  Commonly known as: CARDURA        metoprolol succinate  MG 24 hr tablet  Commonly known as: TOPROL-XL        valsartan-hydrochlorothiazide 320-25 MG per tablet  Commonly known as: DIOVAN-HCT              Where to Get Your Medications      These medications were sent to [place] [id number]    Hours: 7:00AM TO 5:00PM MON TO FRI Phone: [phone]   · apixaban 5 MG tablet tablet  · atorvastatin 20 MG tablet  · dilTIAZem  MG 24 hr capsule  · metoprolol tartrate 25 MG tablet         Home medications and discharge medications  reconciled with patient      Condition on Discharge: Stable    Discharge Disposition  Home    Visiting Nurse:    No     Home PT/OT:  No     Home Safety Evaluation:  No     DME  None    Discharge Diet: Cardiac consistent carb       Activity at Discharge:  As tolerated      Follow-up Appointments:   Follow-up Information     Kash Simpson MD .    Specialty: Family Medicine  Contact information:  [address]  [address]  [place] [address]  [phone]                         Test Results Pending at Discharge       Kash Simpson MD  04/22/23  12:20 EDT          Much of this encounter note is an electronic transcription/translation of spoken language to printed text. The electronic translation of spoken language may permit erroneous, or at times, nonsensical words or phrases to be inadvertently transcribed; Although I have reviewed the note for such errors, some may still exist.    Note Disclaimer: At [facility], we believe that sharing information builds trust and better relationships. You are receiving this note because you recently visited [facility]. It is possible you will see health information before a provider has talked with you about it. This kind of information can be easy to misunderstand. To help you fully understand what it means for your health, we urge you to discuss this note with your provider.

## 2023-04-24 LAB
MAXIMAL PREDICTED HEART RATE: 150 BPM
STRESS TARGET HR: 128 BPM

## 2023-04-26 NOTE — PROGRESS NOTES
Please notify patient that Holter shows controlled heart rate.  I will discuss further treatment options during his next office appointment with me.  Let me know if he has any questions.  Otherwise continue current care.  Thank you

## 2023-04-27 ENCOUNTER — READMISSION MANAGEMENT (OUTPATIENT)
Dept: CALL CENTER | Facility: HOSPITAL | Age: 73
End: 2023-04-27
Payer: MEDICARE

## 2023-04-27 ENCOUNTER — TELEPHONE (OUTPATIENT)
Dept: CARDIOLOGY | Facility: CLINIC | Age: 73
End: 2023-04-27
Payer: MEDICARE

## 2023-04-27 NOTE — OUTREACH NOTE
LAG < 7 Survey    Flowsheet Row Responses   Confucianist facility patient discharged from? [place]   Does the patient have one of the following disease processes/diagnoses(primary or secondary)? Other   BHLAG <7 Attempt successful? No   Unsuccessful attempts Attempt 1          Nicole Hall Licensed Nurse

## 2023-04-27 NOTE — TELEPHONE ENCOUNTER
Called and left VM. Will continue to try to reach patient.     Ania Lanza RN  Triage [facility]

## 2023-04-27 NOTE — TELEPHONE ENCOUNTER
----- Message from Fernando Ruffin MD sent at 4/26/2023  4:49 PM EDT -----  Please notify patient that Holter shows controlled heart rate.  I will discuss further treatment options during his next office appointment with me.  Let me know if he has any questions.  Otherwise continue current care.  Thank you

## 2023-04-27 NOTE — TELEPHONE ENCOUNTER
Notified patient of results/recommendations. Patient verbalized understanding.    Ania Lanza RN  Triage [facility]

## 2023-04-28 ENCOUNTER — OFFICE VISIT (OUTPATIENT)
Dept: SLEEP MEDICINE | Facility: HOSPITAL | Age: 73
End: 2023-04-28
Payer: MEDICARE

## 2023-04-28 VITALS
DIASTOLIC BLOOD PRESSURE: 94 MMHG | WEIGHT: 286 LBS | BODY MASS INDEX: 38.74 KG/M2 | HEIGHT: 72 IN | OXYGEN SATURATION: 96 % | SYSTOLIC BLOOD PRESSURE: 150 MMHG | HEART RATE: 80 BPM

## 2023-04-28 DIAGNOSIS — G47.33 OBSTRUCTIVE SLEEP APNEA: Primary | ICD-10-CM

## 2023-04-28 DIAGNOSIS — E66.01 CLASS 2 SEVERE OBESITY DUE TO EXCESS CALORIES WITH SERIOUS COMORBIDITY AND BODY MASS INDEX (BMI) OF 38.0 TO 38.9 IN ADULT: ICD-10-CM

## 2023-04-28 PROBLEM — E66.812 CLASS 2 SEVERE OBESITY DUE TO EXCESS CALORIES WITH SERIOUS COMORBIDITY AND BODY MASS INDEX (BMI) OF 38.0 TO 38.9 IN ADULT: Status: ACTIVE | Noted: 2023-04-28

## 2023-04-28 PROCEDURE — G0463 HOSPITAL OUTPT CLINIC VISIT: HCPCS

## 2023-05-01 ENCOUNTER — READMISSION MANAGEMENT (OUTPATIENT)
Dept: CALL CENTER | Facility: HOSPITAL | Age: 73
End: 2023-05-01
Payer: MEDICARE

## 2023-05-01 NOTE — OUTREACH NOTE
LAG < 7 Survey    Flowsheet Row Responses   Hindu facility patient discharged from? [facility]   Does the patient have one of the following disease processes/diagnoses(primary or secondary)? Other   BHLAG <7 Attempt successful? No   Unsuccessful attempts Attempt 2          Stella MIJARES - Registered Nurse

## 2023-05-05 ENCOUNTER — READMISSION MANAGEMENT (OUTPATIENT)
Dept: CALL CENTER | Facility: HOSPITAL | Age: 73
End: 2023-05-05
Payer: MEDICARE

## 2023-05-05 NOTE — OUTREACH NOTE
LAG < 7 Survey    Flowsheet Row Responses   Gnosticism facility patient discharged from? [facility]   Does the patient have one of the following disease processes/diagnoses(primary or secondary)? Other   BHLAG <7 Attempt successful? No   Unsuccessful attempts Attempt 3          Stella MIJARES - Registered Nurse

## 2023-05-11 ENCOUNTER — OFFICE VISIT (OUTPATIENT)
Dept: CARDIOLOGY | Facility: CLINIC | Age: 73
End: 2023-05-11
Payer: MEDICARE

## 2023-05-11 VITALS
BODY MASS INDEX: 37.96 KG/M2 | OXYGEN SATURATION: 95 % | DIASTOLIC BLOOD PRESSURE: 100 MMHG | HEART RATE: 89 BPM | SYSTOLIC BLOOD PRESSURE: 140 MMHG | HEIGHT: 72 IN | WEIGHT: 280.3 LBS

## 2023-05-11 DIAGNOSIS — I10 ESSENTIAL HYPERTENSION: ICD-10-CM

## 2023-05-11 DIAGNOSIS — I48.3 TYPICAL ATRIAL FLUTTER: Primary | ICD-10-CM

## 2023-05-11 DIAGNOSIS — E78.2 MIXED HYPERLIPIDEMIA: ICD-10-CM

## 2023-05-11 NOTE — PROGRESS NOTES
PATIENTINFORMATION    Date of Office Visit: 2023  Encounter Provider: Fernando Ruffin MD  Place of Service: [facility] CARDIOLOGY  Patient Name: Jefry Sabillon  : 1950    Subjective:     Encounter Date:2023      Patient ID: Jefry Sabillon is a 72 y.o. male.    No chief complaint on file.    HPI  Mr. Sabillon is a 72 years old gentleman who came to cardiology clinic for follow-up visit.  He is accompanied by his wife.  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  He still reports exertional fatigue and shortness of breath but denies palpitations, presyncope or syncope or extremity swelling.  Blood pressure slightly on the higher side during home monitoring and metoprolol dose increased by his PCP few weeks ago.      ROS  All systems reviewed and negative except as noted in HPI.    Past Medical History:   Diagnosis Date   • Arthritis    • Asthma    • Atrial flutter 2023   • Cardiac disease    • Coronary artery disease    • Diabetes mellitus     take Metformin   • Heart disease    • Hypertension    • IFG (impaired fasting glucose) 2021   • Knee sprain    • Mini stroke    • EVE (obstructive sleep apnea)    • Rotator cuff syndrome        Past Surgical History:   Procedure Laterality Date   • CHOLECYSTECTOMY WITH INTRAOPERATIVE CHOLANGIOGRAM N/A 2021    Procedure: CHOLECYSTECTOMY LAPAROSCOPIC INTRAOPERATIVE CHOLANGIOGRAM;  Surgeon: Chikis Wing MD;  Location: [facility] OR;  Service: General;  Laterality: N/A;   • HERNIA REPAIR     • JOINT MANIPULATION Right 2019    Procedure: KNEE MANIPULATION;  Surgeon: Lopez Mejias MD;  Location: [facility] OR;  Service: Orthopedics   • KNEE SURGERY     • TOTAL KNEE ARTHROPLASTY Right 2019    Procedure: TOTAL KNEE ARTHROPLASTY AND ALL ASSOCIATED PROCEDURES;  Surgeon: Lopez Mejias MD;  Location: [facility] OR;  Service:  "Orthopedics       Social History     Socioeconomic History   • Marital status:    Tobacco Use   • Smoking status: Never   • Smokeless tobacco: Never   • Tobacco comments:     caffeine use   Vaping Use   • Vaping Use: Never used   Substance and Sexual Activity   • Alcohol use: Never   • Drug use: No   • Sexual activity: Yes       Family History   Problem Relation Age of Onset   • Hypertension Father    • Heart disease Father    • Stroke Father            ECG 12 Lead    Date/Time: 5/11/2023 12:45 PM  Performed by: Fernando Ruffin MD  Authorized by: Fernando Ruffin MD   Comparison: compared with previous ECG from 4/19/2023  Rhythm: atrial flutter  Rate: normal  Conduction: conduction normal  ST Segments: ST segments normal  T Waves: T waves normal  QRS axis: normal  Other: no other findings    Clinical impression: abnormal EKG               Objective:     /100   Pulse 89   Ht 182.9 cm (72\")   Wt 127 kg (280 lb 4.8 oz)   SpO2 95%   BMI 38.02 kg/m²  Body mass index is 38.02 kg/m².     Constitutional:       General: Not in acute distress.     Appearance: Well-developed. Not diaphoretic.   Eyes:      Pupils: Pupils are equal, round, and reactive to light.   HENT:      Head: Normocephalic and atraumatic.   Neck:      Thyroid: No thyromegaly.   Pulmonary:      Effort: Pulmonary effort is normal. No respiratory distress.      Breath sounds: Normal breath sounds. No wheezing. No rales.   Chest:      Chest wall: Not tender to palpatation.   Cardiovascular:      Normal rate. Regular rhythm.      No gallop.   Pulses:     Intact distal pulses.   Edema:     Peripheral edema absent.   Abdominal:      General: Bowel sounds are normal. There is no distension.      Palpations: Abdomen is soft.      Tenderness: There is no guarding.   Musculoskeletal: Normal range of motion.         General: No deformity.      Cervical back: Normal range of motion and neck supple. Skin:     General: Skin is warm and dry.    "   Findings: No rash.   Neurological:      Mental Status: Alert and oriented to person, place, and time.      Cranial Nerves: No cranial nerve deficit.      Deep Tendon Reflexes: Reflexes are normal and symmetric.   Psychiatric:         Judgment: Judgment normal.         Review Of Data: I have reviewed pertinent recent labs, images and documents and pertinent findings included in HPI or assessment below.    Lipid Panel        4/19/2023    10:33   Lipid Panel   Total Cholesterol 165     Triglycerides 197     HDL Cholesterol 40     VLDL Cholesterol 34     LDL Cholesterol  91     LDL/HDL Ratio 2.14              Assessment/Plan:         1. Persistent typical atrial flutter first noted during routine PCP office visit on 4/19/2023 that prompted ER referral and admission for rate control.  Unremarkable echocardiogram.  He had a normal myocardial perfusion study in 2018.  He was subsequently started on Eliquis and rate control medications with metoprolol and diltiazem.  2. Essential hypertension used to be on on valsartan/hydrochlorothiazide that was held to give more room for AV birgit blockers  3. Mixed hyperlipidemia: On atorvastatin and fenofibrate.  4. Type 2 diabetes mellitus   5. Obesity with EVE on CPAP machine-compliant with CPAP  6. History of pericarditis in 2018-no known recurrence  7. History of degenerative joint disease and knee surgery.  8. Coronary artery calcification involving proximal LAD-chest pain-free.  Normal myocardial perfusion study in 2018.  Continue statin    Mr. Araujo continues to have exertional fatigue and shortness of breath likely due to atrial flutter.  Heart rate in office today at rest was in the 80s but I suspect he may have more tachycardia during activities.  He is very compliant with current medications and has not missed Eliquis for more than 3 weeks now.  I will schedule him for cardioversion next week.  He will continue Eliquis  Diagnosis and plan of care discussed with patient and  verbalized understanding.            Your medication list          Accurate as of May 11, 2023  1:28 PM. If you have any questions, ask your nurse or doctor.            CONTINUE taking these medications      Instructions Last Dose Given Next Dose Due   allopurinol 300 MG tablet  Commonly known as: ZYLOPRIM      1 tablet Every Night.       aspirin 81 MG tablet      Take 1 tablet by mouth Daily.       atorvastatin 20 MG tablet  Commonly known as: LIPITOR      Take 1 tablet by mouth Every Night.       cetirizine 10 MG tablet  Commonly known as: zyrTEC      Take 1 tablet by mouth Daily.       dilTIAZem  MG 24 hr capsule  Commonly known as: CARDIZEM CD      Take 1 capsule by mouth Daily.       Eliquis 5 MG tablet tablet  Generic drug: apixaban      Take 1 tablet by mouth 2 (Two) Times a Day.       fenofibrate 145 MG tablet  Commonly known as: TRICOR      Take 1 tablet by mouth Daily.       furosemide 20 MG tablet  Commonly known as: LASIX      Take 1 tablet by mouth Daily As Needed.       ipratropium-albuterol  MCG/ACT inhaler  Commonly known as: COMBIVENT RESPIMAT      Inhale 1 puff 4 (Four) Times a Day As Needed for Wheezing.       metFORMIN 500 MG tablet  Commonly known as: GLUCOPHAGE      Take 1 tablet by mouth 2 (Two) Times a Day With Meals.       metoprolol tartrate 25 MG tablet  Commonly known as: LOPRESSOR      Take 1 tablet by mouth Every 12 (Twelve) Hours.       pantoprazole 40 MG EC tablet  Commonly known as: PROTONIX      Take 1 tablet by mouth Daily.                  Fernando Ruffin MD  05/11/23  13:28 EDT

## 2023-05-18 ENCOUNTER — ANESTHESIA EVENT (OUTPATIENT)
Dept: POSTOP/PACU | Facility: HOSPITAL | Age: 73
End: 2023-05-18
Payer: MEDICARE

## 2023-05-18 ENCOUNTER — ANESTHESIA (OUTPATIENT)
Dept: POSTOP/PACU | Facility: HOSPITAL | Age: 73
End: 2023-05-18
Payer: MEDICARE

## 2023-05-18 ENCOUNTER — HOSPITAL ENCOUNTER (OUTPATIENT)
Dept: POSTOP/PACU | Facility: HOSPITAL | Age: 73
Setting detail: HOSPITAL OUTPATIENT SURGERY
Discharge: HOME | End: 2023-05-18
Payer: MEDICARE

## 2023-05-18 VITALS
HEIGHT: 72 IN | BODY MASS INDEX: 38.25 KG/M2 | OXYGEN SATURATION: 98 % | WEIGHT: 282.4 LBS | TEMPERATURE: 97.8 F | RESPIRATION RATE: 20 BRPM | HEART RATE: 63 BPM | DIASTOLIC BLOOD PRESSURE: 93 MMHG | SYSTOLIC BLOOD PRESSURE: 141 MMHG

## 2023-05-18 DIAGNOSIS — I48.3 TYPICAL ATRIAL FLUTTER: ICD-10-CM

## 2023-05-18 DIAGNOSIS — I48.92 ATRIAL FLUTTER, UNSPECIFIED TYPE: Primary | ICD-10-CM

## 2023-05-18 LAB
ANION GAP SERPL CALCULATED.3IONS-SCNC: 10.3 MMOL/L (ref 5–15)
APTT PPP: 31.3 SECONDS (ref 24.3–38.1)
BASOPHILS # BLD AUTO: 0.05 10*3/MM3 (ref 0–0.2)
BASOPHILS NFR BLD AUTO: 0.6 % (ref 0–1.5)
BUN SERPL-MCNC: 16 MG/DL (ref 8–23)
BUN/CREAT SERPL: 14.3 (ref 7–25)
CALCIUM SPEC-SCNC: 9.9 MG/DL (ref 8.6–10.5)
CHLORIDE SERPL-SCNC: 105 MMOL/L (ref 98–107)
CO2 SERPL-SCNC: 25.7 MMOL/L (ref 22–29)
CREAT SERPL-MCNC: 1.12 MG/DL (ref 0.76–1.27)
DEPRECATED RDW RBC AUTO: 47.2 FL (ref 37–54)
EGFRCR SERPLBLD CKD-EPI 2021: 70.7 ML/MIN/1.73
EOSINOPHIL # BLD AUTO: 0.2 10*3/MM3 (ref 0–0.4)
EOSINOPHIL NFR BLD AUTO: 2.6 % (ref 0.3–6.2)
ERYTHROCYTE [DISTWIDTH] IN BLOOD BY AUTOMATED COUNT: 14.2 % (ref 12.3–15.4)
GLUCOSE SERPL-MCNC: 102 MG/DL (ref 65–99)
HCT VFR BLD AUTO: 41.7 % (ref 37.5–51)
HGB BLD-MCNC: 13.5 G/DL (ref 13–17.7)
IMM GRANULOCYTES # BLD AUTO: 0.03 10*3/MM3 (ref 0–0.05)
IMM GRANULOCYTES NFR BLD AUTO: 0.4 % (ref 0–0.5)
LYMPHOCYTES # BLD AUTO: 2.16 10*3/MM3 (ref 0.7–3.1)
LYMPHOCYTES NFR BLD AUTO: 28 % (ref 19.6–45.3)
MAGNESIUM SERPL-MCNC: 1.8 MG/DL (ref 1.6–2.4)
MCH RBC QN AUTO: 28.8 PG (ref 26.6–33)
MCHC RBC AUTO-ENTMCNC: 32.4 G/DL (ref 31.5–35.7)
MCV RBC AUTO: 88.9 FL (ref 79–97)
MONOCYTES # BLD AUTO: 0.72 10*3/MM3 (ref 0.1–0.9)
MONOCYTES NFR BLD AUTO: 9.3 % (ref 5–12)
NEUTROPHILS NFR BLD AUTO: 4.55 10*3/MM3 (ref 1.7–7)
NEUTROPHILS NFR BLD AUTO: 59.1 % (ref 42.7–76)
PLATELET # BLD AUTO: 254 10*3/MM3 (ref 140–450)
PMV BLD AUTO: 9.9 FL (ref 6–12)
POTASSIUM SERPL-SCNC: 4.3 MMOL/L (ref 3.5–5.2)
QT INTERVAL: 409 MS
QT INTERVAL: 447 MS
RBC # BLD AUTO: 4.69 10*6/MM3 (ref 4.14–5.8)
SODIUM SERPL-SCNC: 141 MMOL/L (ref 136–145)
WBC NRBC COR # BLD: 7.71 10*3/MM3 (ref 3.4–10.8)

## 2023-05-18 PROCEDURE — 80048 BASIC METABOLIC PNL TOTAL CA: CPT | Performed by: INTERNAL MEDICINE

## 2023-05-18 PROCEDURE — 85730 THROMBOPLASTIN TIME PARTIAL: CPT | Performed by: INTERNAL MEDICINE

## 2023-05-18 PROCEDURE — 85025 COMPLETE CBC W/AUTO DIFF WBC: CPT | Performed by: INTERNAL MEDICINE

## 2023-05-18 PROCEDURE — 92960 CARDIOVERSION ELECTRIC EXT: CPT

## 2023-05-18 PROCEDURE — 93005 ELECTROCARDIOGRAM TRACING: CPT | Performed by: EMERGENCY MEDICINE

## 2023-05-18 PROCEDURE — 25010000002 PROPOFOL 200 MG/20ML EMULSION: Performed by: ANESTHESIOLOGY

## 2023-05-18 PROCEDURE — 83735 ASSAY OF MAGNESIUM: CPT | Performed by: INTERNAL MEDICINE

## 2023-05-18 RX ORDER — LIDOCAINE HYDROCHLORIDE 20 MG/ML
INJECTION, SOLUTION INFILTRATION; PERINEURAL AS NEEDED
Status: DISCONTINUED | OUTPATIENT
Start: 2023-05-18 | End: 2023-05-18 | Stop reason: SURG

## 2023-05-18 RX ORDER — PROPOFOL 10 MG/ML
INJECTION, EMULSION INTRAVENOUS AS NEEDED
Status: DISCONTINUED | OUTPATIENT
Start: 2023-05-18 | End: 2023-05-18 | Stop reason: SURG

## 2023-05-18 RX ORDER — SODIUM CHLORIDE, SODIUM LACTATE, POTASSIUM CHLORIDE, CALCIUM CHLORIDE 600; 310; 30; 20 MG/100ML; MG/100ML; MG/100ML; MG/100ML
INJECTION, SOLUTION INTRAVENOUS CONTINUOUS PRN
Status: DISCONTINUED | OUTPATIENT
Start: 2023-05-18 | End: 2023-05-18 | Stop reason: SURG

## 2023-05-18 RX ADMIN — SODIUM CHLORIDE, POTASSIUM CHLORIDE, SODIUM LACTATE AND CALCIUM CHLORIDE: 600; 310; 30; 20 INJECTION, SOLUTION INTRAVENOUS at 07:35

## 2023-05-18 RX ADMIN — PROPOFOL INJECTABLE EMULSION 150 MG: 10 INJECTION, EMULSION INTRAVENOUS at 07:39

## 2023-05-18 RX ADMIN — LIDOCAINE HYDROCHLORIDE 100 MG: 20 INJECTION, SOLUTION INFILTRATION; PERINEURAL at 07:39

## 2023-05-18 NOTE — ANESTHESIA PREPROCEDURE EVALUATION
Anesthesia Evaluation     Patient summary reviewed and Nursing notes reviewed   no history of anesthetic complications:  NPO Solid Status: > 8 hours  NPO Liquid Status: < 2 hours           Airway   Mallampati: II  TM distance: >3 FB  Neck ROM: full  No difficulty expected and Large neck circumference  Dental    (+) upper dentures    Pulmonary     breath sounds clear to auscultation  (+) asthma (dulera BID, used last pm),sleep apnea on CPAP,   Cardiovascular   Exercise tolerance: good (4-7 METS)    PT is on anticoagulation therapy  Patient on routine beta blocker and Beta blocker given within 24 hours of surgery  Rate: normal    (+) hypertension well controlled 2 medications or greater, CAD, dysrhythmias Atrial Flutter, hyperlipidemia,       Neuro/Psych  TIA: denies. Headaches: ocular migraines. Dizziness: occ light headedness.  GI/Hepatic/Renal/Endo    (+) obesity,  GERD well controlled,  diabetes mellitus (pre-[id number] 5.2) type 2,     Musculoskeletal     Abdominal   (+) obese,    Substance History      OB/GYN negative ob/gyn ROS         Other   arthritis (right TKA, arthritis left knee),      ROS/Med Hx Other: Sip water with eliquis, 0600                Anesthesia Plan    ASA 3     MAC     intravenous induction     Anesthetic plan, risks, benefits, and alternatives have been provided, discussed and informed consent has been obtained with: patient and spouse/significant other.    Use of blood products discussed with patient and spouse/significant other  Consented to blood products.       CODE STATUS:

## 2023-05-18 NOTE — ANESTHESIA POSTPROCEDURE EVALUATION
Patient: Jefry Sabillon    Procedure Summary     Date: 05/18/23 Room / Location: [facility] POST ANESTHESIA CARE UNIT    Anesthesia Start: 0735 Anesthesia Stop: 0746    Procedure: CARDIOVERSION EXTERNAL IN CARDIOLOGY DEPARTMENT Diagnosis:       Typical atrial flutter      (Atrial flutter.)    Scheduled Providers:  Provider: Erika Mcadams MD    Anesthesia Type: MAC ASA Status: 3          Anesthesia Type: MAC    Vitals  Vitals Value Taken Time   /85 05/18/23 0805   Temp     Pulse 60 05/18/23 0805   Resp 20 05/18/23 0805   SpO2 98 % 05/18/23 0805           Post Anesthesia Care and Evaluation    Patient location during evaluation: PHASE II  Patient participation: complete - patient participated  Level of consciousness: awake and alert  Pain score: 0  Pain management: satisfactory to patient    Airway patency: patent  Anesthetic complications: No anesthetic complications  PONV Status: none  Cardiovascular status: acceptable  Respiratory status: acceptable  Hydration status: acceptable    Comments: Late entry, patient seen prior to discharge.

## 2023-05-18 NOTE — H&P
Date of Hospital Visit: 2023  Encounter Provider: Fernando Ruffin MD  Place of Service: [facility] CARDIOLOGY  Patient Name: Jefry Sabillon  :1950  Referral Provider: Fernando Ruffin MD    Chief complaint: Atrial fibrillation    History of Present Illness  Mr. Sabillon came in for elective cardioversion for atrial fibrillation.  He denied any interruption of anticoagulation in the last 1 months.  Compliant with medications.  He denies any significant new symptoms today.  He has been n.p.o. since after midnight.    Past Medical History:   Diagnosis Date   • Arthritis    • Asthma    • Atrial flutter 2023   • Cardiac disease    • Coronary artery disease    • Diabetes mellitus     take Metformin   • Heart disease    • Hypertension    • IFG (impaired fasting glucose) 2021   • Knee sprain    • Mini stroke    • EVE (obstructive sleep apnea)        Past Surgical History:   Procedure Laterality Date   • CHOLECYSTECTOMY WITH INTRAOPERATIVE CHOLANGIOGRAM N/A 2021    Procedure: CHOLECYSTECTOMY LAPAROSCOPIC INTRAOPERATIVE CHOLANGIOGRAM;  Surgeon: Chikis Wing MD;  Location: [facility];  Service: General;  Laterality: N/A;   • HERNIA REPAIR     • JOINT MANIPULATION Right 2019    Procedure: KNEE MANIPULATION;  Surgeon: Lopez Mejias MD;  Location: [facility];  Service: Orthopedics   • KNEE SURGERY  1970   • TOTAL KNEE ARTHROPLASTY Right 2019    Procedure: TOTAL KNEE ARTHROPLASTY AND ALL ASSOCIATED PROCEDURES;  Surgeon: Lopez Mejias MD;  Location: [facility] OR;  Service: Orthopedics       (Not in a hospital admission)      Current Meds  Scheduled Meds:  Continuous Infusions:No current facility-administered medications for this encounter.    PRN Meds:.    Allergies as of 2023 - Reviewed 2023   Allergen Reaction Noted   • Penicillins Dermatitis and Other (See Comments) 2016       Social History     Socioeconomic History   •  "Marital status:    Tobacco Use   • Smoking status: Never   • Smokeless tobacco: Never   • Tobacco comments:     caffeine use   Vaping Use   • Vaping Use: Never used   Substance and Sexual Activity   • Alcohol use: Yes     Comment: sometimes - not very often   • Drug use: No   • Sexual activity: Yes       Family History   Problem Relation Age of Onset   • Hypertension Father    • Heart disease Father    • Stroke Father        REVIEW OF SYSTEMS:   All systems reviewed and pertinent positives include in HPI otherwise negative review of systems.       Objective:   Temp:  [97.8 °F (36.6 °C)] 97.8 °F (36.6 °C)  Heart Rate:  [] 63  Resp:  [12-23] 20  BP: (124-176)/() 141/93  Body mass index is 38.3 kg/m².  Flowsheet Rows    Flowsheet Row First Filed Value   Admission Height 182.9 cm (72\") Documented at 05/18/2023 0650   Admission Weight 128 kg (282 lb 6.4 oz) Documented at 05/18/2023 0650        Vitals:    05/18/23 0830   BP: 141/93   Pulse: 63   Resp: 20   Temp:    SpO2: 98%       General Appearance:    Alert, cooperative, in no acute distress   Head:    Normocephalic, without obvious abnormality, atraumatic   Eyes:            Lids and lashes normal, conjunctivae and sclerae normal, no   icterus, no pallor, corneas clear, PERRLA   Ears:    Ears appear intact with no abnormalities noted   Throat:   No oral lesions, no thrush, oral mucosa moist   Neck:   No adenopathy, supple, trachea midline, no thyromegaly, no   carotid bruit, no JVD   Back:     No kyphosis present, no scoliosis present, no skin lesions, erythema or scars, no tenderness to percussion or palpation, range of motion normal   Lungs:     Clear to auscultation,respirations regular, even and unlabored    Heart:   Irregularly irregular and tachycardic, normal S1 and S2, no murmur, no gallop, no rub, no click   Chest Wall:    No abnormalities observed   Abdomen:     Normal bowel sounds, no masses, no organomegaly, soft nontender, nondistended, " no guarding, no rebound  tenderness   Extremities:   Moves all extremities well, no edema, no cyanosis, no redness   Pulses:   Pulses palpable and equal bilaterally. Normal radial, carotid, femoral, dorsalis pedis and posterior tibial pulses bilaterally. Normal abdominal aorta   Skin:  Neurology:   Psychiatric:   No bleeding, bruising or rash   Normal speech and cranial nerve exam, no focal deficit   Alert and oriented x 3, normal mood and affect             Review of Data:      Results from last 7 days   Lab Units 05/18/23  0725   SODIUM mmol/L 141   POTASSIUM mmol/L 4.3   CHLORIDE mmol/L 105   CO2 mmol/L 25.7   BUN mg/dL 16   CREATININE mg/dL 1.12   CALCIUM mg/dL 9.9   GLUCOSE mg/dL 102*         @LABRCNTbnp@  Results from last 7 days   Lab Units 05/18/23  0725   WBC 10*3/mm3 7.71   HEMOGLOBIN g/dL 13.5   HEMATOCRIT % 41.7   PLATELETS 10*3/mm3 254     Results from last 7 days   Lab Units 05/18/23  0725   APTT seconds 31.3     Results from last 7 days   Lab Units 05/18/23  0725   MAGNESIUM mg/dL 1.8     @LABRCNTIP(chol,trig,hdl,ldl)    I personally viewed and interpreted the patient's EKG/Telemetry data  )   * No active hospital problems. *        Assessment and Plan:    1. Symptomatic persistent atrial fibrillation that is adequately anticoagulated.    Informed consent obtained.  Proceed with DCCV    Fernando Ruffin MD  05/18/23  15:08 EDT.      Time spent in reviewing chart, discussion and examination:

## 2023-05-18 NOTE — PROCEDURES
Cardioversion done in PACU.  Anesthesia administered by anesthesiologist.  Informed consent obtained and patient name and date of birth verified before procedure  He was in atrial fibrillation at the beginning of procedure.  Deliveredsynchronized 200 J of energy and patient converted to sinus rhythm with 1st attempt.  No complications.

## 2023-05-31 ENCOUNTER — OFFICE VISIT (OUTPATIENT)
Dept: CARDIOLOGY | Facility: CLINIC | Age: 73
End: 2023-05-31

## 2023-05-31 VITALS
RESPIRATION RATE: 16 BRPM | BODY MASS INDEX: 37.11 KG/M2 | DIASTOLIC BLOOD PRESSURE: 90 MMHG | SYSTOLIC BLOOD PRESSURE: 138 MMHG | HEART RATE: 61 BPM | WEIGHT: 274 LBS | OXYGEN SATURATION: 98 % | HEIGHT: 72 IN

## 2023-05-31 DIAGNOSIS — I48.3 TYPICAL ATRIAL FLUTTER: Primary | ICD-10-CM

## 2023-05-31 DIAGNOSIS — I10 ESSENTIAL HYPERTENSION: ICD-10-CM

## 2023-05-31 DIAGNOSIS — G47.33 OBSTRUCTIVE SLEEP APNEA: ICD-10-CM

## 2023-05-31 DIAGNOSIS — E78.2 MIXED HYPERLIPIDEMIA: ICD-10-CM

## 2023-05-31 PROCEDURE — 1159F MED LIST DOCD IN RCRD: CPT | Performed by: NURSE PRACTITIONER

## 2023-05-31 PROCEDURE — 99214 OFFICE O/P EST MOD 30 MIN: CPT | Performed by: NURSE PRACTITIONER

## 2023-05-31 PROCEDURE — 3075F SYST BP GE 130 - 139MM HG: CPT | Performed by: NURSE PRACTITIONER

## 2023-05-31 PROCEDURE — 93000 ELECTROCARDIOGRAM COMPLETE: CPT | Performed by: NURSE PRACTITIONER

## 2023-05-31 PROCEDURE — 3080F DIAST BP >= 90 MM HG: CPT | Performed by: NURSE PRACTITIONER

## 2023-05-31 PROCEDURE — 1160F RVW MEDS BY RX/DR IN RCRD: CPT | Performed by: NURSE PRACTITIONER

## 2023-05-31 NOTE — LETTER
May 31, 2023       No Recipients    Patient: Jefry Sabillon   YOB: 1950   Date of Visit: 2023       Dear Dr. Lomeli Recipients:    Thank you for referring Jefry Sabillon to me for evaluation. Below are the relevant portions of my assessment and plan of care.    If you have questions, please do not hesitate to call me. I look forward to following Jefry along with you.         Sincerely,        RASHAD Membreno        CC:   No Recipients    Maria L Medrano APRN  23 1403  Signed    Date of Office Visit: 2023  Encounter Provider: RASHAD Alegria  Place of Service: [facility] CARDIOLOGY  Patient Name: Jefry Sabillon  :1950  Primary Cardiologist: Dr. Ruffin    CC:  2 week follow up from cardioversion    Dear Dr. Simpson    HPI: Jefry Sabillon is a pleasant 72 y.o. male who presents 2023 for cardiac follow up.      He saw Dr. Ruffin 2023.  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  He was having exertional fatigue and shortness of breath.  He denied any palpitations, syncope or presyncopal episodes.   Blood pressure slightly on the higher side during home monitoring and metoprolol dose increased by his PCP few weeks ago.with his continued symptoms, Dr. Ruffin performed a cardioversion for his atrial flutter.  He had a successful cardioversion performed 2023.    He returns today for 2-week follow-up.  Unfortunately he is back in atrial flutter.  He really cannot feel it.  He actually states he feels better, even though he has been back in atrial fibrillation for at least a week, then he did prior to the cardioversion.  He states he saw you last week and you performed an EKG and it showed sinus rhythm.  He has not missed any doses of his medication.  He denies any palpitations, shortness of breath, lower extremity edema.  He denies any dizziness,  lightheadedness, syncope or presyncopal episodes.  He has not had any chest pain, pressure or fatigue.  He and his wife have been working approximately 5 hours every day for the last week to 2 and they are gardens getting them fixed and he can do so with just a normal response to tiredness.  He states prior to the cardioversion he could not walk the 200 feet to his barn without having to sit down and rest and feeling very short of breath.  He does have as needed Lasix at home that he says he takes periodically for lower extremity edema.     Past Medical History:   Diagnosis Date   • Arthritis    • Asthma    • Atrial flutter 04/20/2023   • Cardiac disease    • Coronary artery disease    • Diabetes mellitus     take Metformin   • Heart disease    • Hypertension    • IFG (impaired fasting glucose) 06/28/2021   • Knee sprain    • Mini stroke    • EVE (obstructive sleep apnea)        Past Surgical History:   Procedure Laterality Date   • CHOLECYSTECTOMY WITH INTRAOPERATIVE CHOLANGIOGRAM N/A 6/25/2021    Procedure: CHOLECYSTECTOMY LAPAROSCOPIC INTRAOPERATIVE CHOLANGIOGRAM;  Surgeon: Chikis Wing MD;  Location: [facility] OR;  Service: General;  Laterality: N/A;   • HERNIA REPAIR  2007   • JOINT MANIPULATION Right 11/11/2019    Procedure: KNEE MANIPULATION;  Surgeon: Lopez Mejias MD;  Location: [facility] OR;  Service: Orthopedics   • KNEE SURGERY  1970   • TOTAL KNEE ARTHROPLASTY Right 9/4/2019    Procedure: TOTAL KNEE ARTHROPLASTY AND ALL ASSOCIATED PROCEDURES;  Surgeon: Lopez Mejias MD;  Location: [facility] OR;  Service: Orthopedics       Social History     Socioeconomic History   • Marital status:    Tobacco Use   • Smoking status: Never   • Smokeless tobacco: Never   • Tobacco comments:     caffeine use   Vaping Use   • Vaping Use: Never used   Substance and Sexual Activity   • Alcohol use: Yes     Comment: sometimes - not very often   • Drug use: No   • Sexual activity: Yes       Family History    Problem Relation Age of Onset   • Hypertension Father    • Heart disease Father    • Stroke Father        The following portion of the patient's history were reviewed and updated as appropriate: past medical history, past surgical history, past social history, past family history, allergies, current medications, and problem list.    Review of Systems   Constitutional: Negative for diaphoresis, fever and malaise/fatigue.   HENT: Negative for congestion, hearing loss, hoarse voice, nosebleeds and sore throat.    Eyes: Negative for photophobia, vision loss in left eye, vision loss in right eye and visual disturbance.   Cardiovascular: Positive for leg swelling (intermittent). Negative for chest pain, dyspnea on exertion, irregular heartbeat, near-syncope, orthopnea, palpitations, paroxysmal nocturnal dyspnea and syncope.   Respiratory: Negative for cough, hemoptysis, shortness of breath, sleep disturbances due to breathing, snoring, sputum production and wheezing.    Endocrine: Negative for cold intolerance, heat intolerance, polydipsia, polyphagia and polyuria.   Hematologic/Lymphatic: Negative for bleeding problem. Does not bruise/bleed easily.   Skin: Negative for color change, dry skin, poor wound healing, rash and suspicious lesions.   Musculoskeletal: Negative for arthritis, back pain, falls, gout, joint pain, joint swelling, muscle cramps, muscle weakness and myalgias.   Gastrointestinal: Negative for bloating, abdominal pain, constipation, diarrhea, dysphagia, melena, nausea and vomiting.   Neurological: Negative for excessive daytime sleepiness, dizziness, headaches, light-headedness, loss of balance, numbness, paresthesias, seizures, vertigo and weakness.   Psychiatric/Behavioral: Negative for depression, memory loss and substance abuse. The patient is not nervous/anxious.        Allergies   Allergen Reactions   • Penicillins Dermatitis and Other (See Comments)     skin sloughing off         Current  "Outpatient Medications:   •  allopurinol (ZYLOPRIM) 300 MG tablet, 1 tablet Every Night., Disp: , Rfl:   •  apixaban (ELIQUIS) 5 MG tablet tablet, Take 1 tablet by mouth 2 (Two) Times a Day., Disp: 60 tablet, Rfl: 0  •  atorvastatin (LIPITOR) 20 MG tablet, Take 1 tablet by mouth Every Night., Disp: 90 tablet, Rfl: 0  •  cetirizine (zyrTEC) 10 MG tablet, Take 1 tablet by mouth Daily., Disp: , Rfl:   •  dilTIAZem CD (CARDIZEM CD) 120 MG 24 hr capsule, Take 1 capsule by mouth Daily., Disp: 30 capsule, Rfl: 0  •  fenofibrate (TRICOR) 145 MG tablet, Take 1 tablet by mouth Daily., Disp: , Rfl:   •  ipratropium-albuterol (COMBIVENT RESPIMAT)  MCG/ACT inhaler, Inhale 1 puff 4 (Four) Times a Day As Needed for Wheezing., Disp: , Rfl:   •  metFORMIN (GLUCOPHAGE) 500 MG tablet, Take 1 tablet by mouth 2 (Two) Times a Day With Meals., Disp: , Rfl:   •  metoprolol tartrate (LOPRESSOR) 25 MG tablet, Take 1 tablet by mouth Every 12 (Twelve) Hours. (Patient taking differently: Take 2 tablets by mouth Every 12 (Twelve) Hours.), Disp: 60 tablet, Rfl: 0  •  pantoprazole (PROTONIX) 40 MG EC tablet, Take 1 tablet by mouth Daily., Disp: , Rfl:        Objective:     Vitals:    05/31/23 1035   BP: 138/90   Pulse: 61   Resp: 16   SpO2: 98%   Weight: 124 kg (274 lb)   Height: 182.9 cm (72\")     Body mass index is 37.16 kg/m².      Vitals reviewed.   Constitutional:       General: Not in acute distress.     Appearance: Well-developed and not in distress.   Eyes:      General:         Right eye: No discharge.         Left eye: No discharge.      Conjunctiva/sclera: Conjunctivae normal.   HENT:      Head: Normocephalic and atraumatic.      Right Ear: External ear normal.      Left Ear: External ear normal.      Nose: Nose normal.   Neck:      Thyroid: No thyromegaly.      Vascular: No JVD.      Trachea: No tracheal deviation.      Lymphadenopathy: No cervical adenopathy.   Pulmonary:      Effort: Pulmonary effort is normal. No respiratory " distress.      Breath sounds: Normal breath sounds. No wheezing. No rales.   Chest:      Chest wall: Not tender to palpatation.   Cardiovascular:      Normal rate. Regularly irregular rhythm.      No gallop.   Pulses:     Intact distal pulses.   Edema:     Peripheral edema absent.   Abdominal:      General: There is no distension.      Palpations: Abdomen is soft.      Tenderness: There is no abdominal tenderness.   Musculoskeletal: Normal range of motion.         General: No tenderness or deformity.      Cervical back: Normal range of motion and neck supple. Skin:     General: Skin is warm and dry.      Findings: No erythema or rash.   Neurological:      Mental Status: Alert and oriented to person, place, and time.      Coordination: Coordination normal.   Psychiatric:         Attention and Perception: Attention normal.         Behavior: Behavior normal.         Thought Content: Thought content normal.         Cognition and Memory: Cognition normal.         Judgment: Judgment normal.               ECG 12 Lead    Date/Time: 5/31/2023 10:46 AM  Performed by: Maria L Medrano APRN  Authorized by: Maria L Medrano APRN   Comparison: compared with previous ECG from 5/18/2023  Rhythm: atrial flutter  Rate: normal  Conduction: right bundle branch block  ST Segments: ST segments normal  T Waves: T waves normal  QRS axis: left  Other findings: non-specific ST-T wave changes    Clinical impression: abnormal EKG              Assessment:       Diagnosis Plan   1. Typical atrial flutter        2. Essential hypertension        3. Mixed hyperlipidemia        4. Obstructive sleep apnea               Plan:       1. Persistent typical atrial flutter first noted during routine PCP office visit on 4/19/2023 that prompted ER referral and admission for rate control.  Unremarkable echocardiogram.  He had a normal myocardial perfusion study in 2018.    He had successful cardioversion performed on May 18/2023.  When he saw PCP last week he was  in sinus rhythm.  Unfortunately within the last week he has reverted back to atrial flutter.  He would be interested in another cardioversion attempt.  2. Essential hypertension used to be on on valsartan/hydrochlorothiazide that was held to give more room for AV birgit blockers  3. Mixed hyperlipidemia: Continue lipid-lowering therapy.  He is currently on atorvastatin and fenofibrate.  4. Type 2 diabetes mellitus   5. Obesity with EVE on CPAP machine-states compliance with CPAP.  6. History of pericarditis in 2018-no known recurrence  7. History of degenerative joint disease and knee surgery.  8. Coronary artery calcification involving proximal LAD-chest pain-free.  Normal myocardial perfusion study in 2018.  Continue statin     He is back in rate controlled atrial flutter.  He would be interested in trying a cardioversion again.  We will send a note to .    As always, it has been a pleasure to participate in your patient's care. Thank you.       Sincerely,       RASHAD Alegria      Current Outpatient Medications:   •  allopurinol (ZYLOPRIM) 300 MG tablet, 1 tablet Every Night., Disp: , Rfl:   •  apixaban (ELIQUIS) 5 MG tablet tablet, Take 1 tablet by mouth 2 (Two) Times a Day., Disp: 60 tablet, Rfl: 0  •  atorvastatin (LIPITOR) 20 MG tablet, Take 1 tablet by mouth Every Night., Disp: 90 tablet, Rfl: 0  •  cetirizine (zyrTEC) 10 MG tablet, Take 1 tablet by mouth Daily., Disp: , Rfl:   •  dilTIAZem CD (CARDIZEM CD) 120 MG 24 hr capsule, Take 1 capsule by mouth Daily., Disp: 30 capsule, Rfl: 0  •  fenofibrate (TRICOR) 145 MG tablet, Take 1 tablet by mouth Daily., Disp: , Rfl:   •  ipratropium-albuterol (COMBIVENT RESPIMAT)  MCG/ACT inhaler, Inhale 1 puff 4 (Four) Times a Day As Needed for Wheezing., Disp: , Rfl:   •  metFORMIN (GLUCOPHAGE) 500 MG tablet, Take 1 tablet by mouth 2 (Two) Times a Day With Meals., Disp: , Rfl:   •  metoprolol tartrate (LOPRESSOR) 25 MG tablet, Take 1 tablet by mouth Every 12  (Twelve) Hours. (Patient taking differently: Take 2 tablets by mouth Every 12 (Twelve) Hours.), Disp: 60 tablet, Rfl: 0  •  pantoprazole (PROTONIX) 40 MG EC tablet, Take 1 tablet by mouth Daily., Disp: , Rfl:       Dictated utilizing Dragon dictation

## 2023-05-31 NOTE — PROGRESS NOTES
Date of Office Visit: 2023  Encounter Provider: RASHAD Alegria  Place of Service: [facility] CARDIOLOGY  Patient Name: Jefry Sabillon  :1950  Primary Cardiologist: Dr. Ruffin    CC:  2 week follow up from cardioversion    Dear Dr. Simpson    HPI: Jefry Sabillon is a pleasant 72 y.o. male who presents 2023 for cardiac follow up.      He saw Dr. Ruffin 2023.  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  He was having exertional fatigue and shortness of breath.  He denied any palpitations, syncope or presyncopal episodes.   Blood pressure slightly on the higher side during home monitoring and metoprolol dose increased by his PCP few weeks ago.with his continued symptoms, Dr. Ruffin performed a cardioversion for his atrial flutter.  He had a successful cardioversion performed 2023.    He returns today for 2-week follow-up.  Unfortunately he is back in atrial flutter.  He really cannot feel it.  He actually states he feels better, even though he has been back in atrial fibrillation for at least a week, then he did prior to the cardioversion.  He states he saw you last week and you performed an EKG and it showed sinus rhythm.  He has not missed any doses of his medication.  He denies any palpitations, shortness of breath, lower extremity edema.  He denies any dizziness, lightheadedness, syncope or presyncopal episodes.  He has not had any chest pain, pressure or fatigue.  He and his wife have been working approximately 5 hours every day for the last week to 2 and they are gardens getting them fixed and he can do so with just a normal response to tiredness.  He states prior to the cardioversion he could not walk the 200 feet to his barn without having to sit down and rest and feeling very short of breath.  He does have as needed Lasix at home that he says he takes  periodically for lower extremity edema.     Past Medical History:   Diagnosis Date   • Arthritis    • Asthma    • Atrial flutter 04/20/2023   • Cardiac disease    • Coronary artery disease    • Diabetes mellitus     take Metformin   • Heart disease    • Hypertension    • IFG (impaired fasting glucose) 06/28/2021   • Knee sprain    • Mini stroke    • EVE (obstructive sleep apnea)        Past Surgical History:   Procedure Laterality Date   • CHOLECYSTECTOMY WITH INTRAOPERATIVE CHOLANGIOGRAM N/A 6/25/2021    Procedure: CHOLECYSTECTOMY LAPAROSCOPIC INTRAOPERATIVE CHOLANGIOGRAM;  Surgeon: Chikis Wing MD;  Location: [facility];  Service: General;  Laterality: N/A;   • HERNIA REPAIR  2007   • JOINT MANIPULATION Right 11/11/2019    Procedure: KNEE MANIPULATION;  Surgeon: Lopez Mejias MD;  Location: [facility] OR;  Service: Orthopedics   • KNEE SURGERY  1970   • TOTAL KNEE ARTHROPLASTY Right 9/4/2019    Procedure: TOTAL KNEE ARTHROPLASTY AND ALL ASSOCIATED PROCEDURES;  Surgeon: Lopez Mejias MD;  Location: [facility] OR;  Service: Orthopedics       Social History     Socioeconomic History   • Marital status:    Tobacco Use   • Smoking status: Never   • Smokeless tobacco: Never   • Tobacco comments:     caffeine use   Vaping Use   • Vaping Use: Never used   Substance and Sexual Activity   • Alcohol use: Yes     Comment: sometimes - not very often   • Drug use: No   • Sexual activity: Yes       Family History   Problem Relation Age of Onset   • Hypertension Father    • Heart disease Father    • Stroke Father        The following portion of the patient's history were reviewed and updated as appropriate: past medical history, past surgical history, past social history, past family history, allergies, current medications, and problem list.    Review of Systems   Constitutional: Negative for diaphoresis, fever and malaise/fatigue.   HENT: Negative for congestion, hearing loss, hoarse voice, nosebleeds and sore  throat.    Eyes: Negative for photophobia, vision loss in left eye, vision loss in right eye and visual disturbance.   Cardiovascular: Positive for leg swelling (intermittent). Negative for chest pain, dyspnea on exertion, irregular heartbeat, near-syncope, orthopnea, palpitations, paroxysmal nocturnal dyspnea and syncope.   Respiratory: Negative for cough, hemoptysis, shortness of breath, sleep disturbances due to breathing, snoring, sputum production and wheezing.    Endocrine: Negative for cold intolerance, heat intolerance, polydipsia, polyphagia and polyuria.   Hematologic/Lymphatic: Negative for bleeding problem. Does not bruise/bleed easily.   Skin: Negative for color change, dry skin, poor wound healing, rash and suspicious lesions.   Musculoskeletal: Negative for arthritis, back pain, falls, gout, joint pain, joint swelling, muscle cramps, muscle weakness and myalgias.   Gastrointestinal: Negative for bloating, abdominal pain, constipation, diarrhea, dysphagia, melena, nausea and vomiting.   Neurological: Negative for excessive daytime sleepiness, dizziness, headaches, light-headedness, loss of balance, numbness, paresthesias, seizures, vertigo and weakness.   Psychiatric/Behavioral: Negative for depression, memory loss and substance abuse. The patient is not nervous/anxious.        Allergies   Allergen Reactions   • Penicillins Dermatitis and Other (See Comments)     skin sloughing off         Current Outpatient Medications:   •  allopurinol (ZYLOPRIM) 300 MG tablet, 1 tablet Every Night., Disp: , Rfl:   •  apixaban (ELIQUIS) 5 MG tablet tablet, Take 1 tablet by mouth 2 (Two) Times a Day., Disp: 60 tablet, Rfl: 0  •  atorvastatin (LIPITOR) 20 MG tablet, Take 1 tablet by mouth Every Night., Disp: 90 tablet, Rfl: 0  •  cetirizine (zyrTEC) 10 MG tablet, Take 1 tablet by mouth Daily., Disp: , Rfl:   •  dilTIAZem CD (CARDIZEM CD) 120 MG 24 hr capsule, Take 1 capsule by mouth Daily., Disp: 30 capsule, Rfl: 0  •   "fenofibrate (TRICOR) 145 MG tablet, Take 1 tablet by mouth Daily., Disp: , Rfl:   •  ipratropium-albuterol (COMBIVENT RESPIMAT)  MCG/ACT inhaler, Inhale 1 puff 4 (Four) Times a Day As Needed for Wheezing., Disp: , Rfl:   •  metFORMIN (GLUCOPHAGE) 500 MG tablet, Take 1 tablet by mouth 2 (Two) Times a Day With Meals., Disp: , Rfl:   •  metoprolol tartrate (LOPRESSOR) 25 MG tablet, Take 1 tablet by mouth Every 12 (Twelve) Hours. (Patient taking differently: Take 2 tablets by mouth Every 12 (Twelve) Hours.), Disp: 60 tablet, Rfl: 0  •  pantoprazole (PROTONIX) 40 MG EC tablet, Take 1 tablet by mouth Daily., Disp: , Rfl:         Objective:     Vitals:    05/31/23 1035   BP: 138/90   Pulse: 61   Resp: 16   SpO2: 98%   Weight: 124 kg (274 lb)   Height: 182.9 cm (72\")     Body mass index is 37.16 kg/m².      Vitals reviewed.   Constitutional:       General: Not in acute distress.     Appearance: Well-developed and not in distress.   Eyes:      General:         Right eye: No discharge.         Left eye: No discharge.      Conjunctiva/sclera: Conjunctivae normal.   HENT:      Head: Normocephalic and atraumatic.      Right Ear: External ear normal.      Left Ear: External ear normal.      Nose: Nose normal.   Neck:      Thyroid: No thyromegaly.      Vascular: No JVD.      Trachea: No tracheal deviation.      Lymphadenopathy: No cervical adenopathy.   Pulmonary:      Effort: Pulmonary effort is normal. No respiratory distress.      Breath sounds: Normal breath sounds. No wheezing. No rales.   Chest:      Chest wall: Not tender to palpatation.   Cardiovascular:      Normal rate. Regularly irregular rhythm.      No gallop.   Pulses:     Intact distal pulses.   Edema:     Peripheral edema absent.   Abdominal:      General: There is no distension.      Palpations: Abdomen is soft.      Tenderness: There is no abdominal tenderness.   Musculoskeletal: Normal range of motion.         General: No tenderness or deformity.      " Cervical back: Normal range of motion and neck supple. Skin:     General: Skin is warm and dry.      Findings: No erythema or rash.   Neurological:      Mental Status: Alert and oriented to person, place, and time.      Coordination: Coordination normal.   Psychiatric:         Attention and Perception: Attention normal.         Behavior: Behavior normal.         Thought Content: Thought content normal.         Cognition and Memory: Cognition normal.         Judgment: Judgment normal.               ECG 12 Lead    Date/Time: 5/31/2023 10:46 AM  Performed by: Maria L Medrano APRN  Authorized by: Maria L Medrano APRN   Comparison: compared with previous ECG from 5/18/2023  Rhythm: atrial flutter  Rate: normal  Conduction: right bundle branch block  ST Segments: ST segments normal  T Waves: T waves normal  QRS axis: left  Other findings: non-specific ST-T wave changes    Clinical impression: abnormal EKG              Assessment:       Diagnosis Plan   1. Typical atrial flutter        2. Essential hypertension        3. Mixed hyperlipidemia        4. Obstructive sleep apnea               Plan:       1. Persistent typical atrial flutter first noted during routine PCP office visit on 4/19/2023 that prompted ER referral and admission for rate control.  Unremarkable echocardiogram.  He had a normal myocardial perfusion study in 2018.    He had successful cardioversion performed on May 18/2023.  When he saw PCP last week he was in sinus rhythm.  Unfortunately within the last week he has reverted back to atrial flutter.  He would be interested in another cardioversion attempt.  2. Essential hypertension used to be on on valsartan/hydrochlorothiazide that was held to give more room for AV birgit blockers  3. Mixed hyperlipidemia: Continue lipid-lowering therapy.  He is currently on atorvastatin and fenofibrate.  4. Type 2 diabetes mellitus   5. Obesity with EVE on CPAP machine-states compliance with CPAP.  6. History of  pericarditis in 2018-no known recurrence  7. History of degenerative joint disease and knee surgery.  8. Coronary artery calcification involving proximal LAD-chest pain-free.  Normal myocardial perfusion study in 2018.  Continue statin     He is back in rate controlled atrial flutter.  He would be interested in trying a cardioversion again.  We will send a note to .    Addendum-6/1/2023-just spoke to Dr. Simpson.  Patient was seen by him today and is having symptoms with his atrial flutter.  He increased his Cardizem up to 240.  We will still wait for Dr. Ruffin about possible second cardioversion and if not successful send to EP.    As always, it has been a pleasure to participate in your patient's care. Thank you.       Sincerely,       RASHAD Alegria      Current Outpatient Medications:   •  allopurinol (ZYLOPRIM) 300 MG tablet, 1 tablet Every Night., Disp: , Rfl:   •  apixaban (ELIQUIS) 5 MG tablet tablet, Take 1 tablet by mouth 2 (Two) Times a Day., Disp: 60 tablet, Rfl: 0  •  atorvastatin (LIPITOR) 20 MG tablet, Take 1 tablet by mouth Every Night., Disp: 90 tablet, Rfl: 0  •  cetirizine (zyrTEC) 10 MG tablet, Take 1 tablet by mouth Daily., Disp: , Rfl:   •  dilTIAZem CD (CARDIZEM CD) 120 MG 24 hr capsule, Take 1 capsule by mouth Daily., Disp: 30 capsule, Rfl: 0  •  fenofibrate (TRICOR) 145 MG tablet, Take 1 tablet by mouth Daily., Disp: , Rfl:   •  ipratropium-albuterol (COMBIVENT RESPIMAT)  MCG/ACT inhaler, Inhale 1 puff 4 (Four) Times a Day As Needed for Wheezing., Disp: , Rfl:   •  metFORMIN (GLUCOPHAGE) 500 MG tablet, Take 1 tablet by mouth 2 (Two) Times a Day With Meals., Disp: , Rfl:   •  metoprolol tartrate (LOPRESSOR) 25 MG tablet, Take 1 tablet by mouth Every 12 (Twelve) Hours. (Patient taking differently: Take 2 tablets by mouth Every 12 (Twelve) Hours.), Disp: 60 tablet, Rfl: 0  •  pantoprazole (PROTONIX) 40 MG EC tablet, Take 1 tablet by mouth Daily., Disp: , Rfl:       Dictated  utilizing Dragon dictation

## 2023-06-15 ENCOUNTER — OFFICE VISIT (OUTPATIENT)
Dept: CARDIOLOGY | Facility: CLINIC | Age: 73
End: 2023-06-15
Payer: MEDICARE

## 2023-06-15 ENCOUNTER — TELEPHONE (OUTPATIENT)
Dept: SLEEP MEDICINE | Facility: HOSPITAL | Age: 73
End: 2023-06-15
Payer: MEDICARE

## 2023-06-15 VITALS
BODY MASS INDEX: 37.65 KG/M2 | RESPIRATION RATE: 16 BRPM | HEIGHT: 72 IN | WEIGHT: 278 LBS | HEART RATE: 57 BPM | OXYGEN SATURATION: 94 % | SYSTOLIC BLOOD PRESSURE: 150 MMHG | DIASTOLIC BLOOD PRESSURE: 90 MMHG

## 2023-06-15 DIAGNOSIS — R06.02 EXERTIONAL SHORTNESS OF BREATH: Primary | ICD-10-CM

## 2023-06-15 DIAGNOSIS — E78.2 MIXED HYPERLIPIDEMIA: ICD-10-CM

## 2023-06-15 DIAGNOSIS — I10 ESSENTIAL HYPERTENSION: ICD-10-CM

## 2023-06-15 DIAGNOSIS — I48.3 TYPICAL ATRIAL FLUTTER: ICD-10-CM

## 2023-06-15 PROCEDURE — 3080F DIAST BP >= 90 MM HG: CPT | Performed by: INTERNAL MEDICINE

## 2023-06-15 PROCEDURE — 3077F SYST BP >= 140 MM HG: CPT | Performed by: INTERNAL MEDICINE

## 2023-06-15 PROCEDURE — 99214 OFFICE O/P EST MOD 30 MIN: CPT | Performed by: INTERNAL MEDICINE

## 2023-06-15 RX ORDER — VALSARTAN AND HYDROCHLOROTHIAZIDE 80; 12.5 MG/1; MG/1
1 TABLET, FILM COATED ORAL DAILY
Qty: 90 TABLET | Refills: 3 | Status: SHIPPED | OUTPATIENT
Start: 2023-06-15

## 2023-06-15 RX ORDER — DILTIAZEM HYDROCHLORIDE 240 MG/1
CAPSULE, COATED, EXTENDED RELEASE ORAL
COMMUNITY
Start: 2023-06-01

## 2023-06-15 RX ORDER — METOPROLOL SUCCINATE 25 MG/1
25 TABLET, EXTENDED RELEASE ORAL 2 TIMES DAILY
Qty: 180 TABLET | Refills: 3 | Status: SHIPPED | OUTPATIENT
Start: 2023-06-15

## 2023-06-15 RX ORDER — METOPROLOL TARTRATE 50 MG/1
50 TABLET, FILM COATED ORAL 2 TIMES DAILY
COMMUNITY
Start: 2023-05-25 | End: 2023-06-15

## 2023-06-15 RX ORDER — METOPROLOL SUCCINATE 50 MG/1
50 TABLET, EXTENDED RELEASE ORAL 2 TIMES DAILY
Qty: 180 TABLET | Refills: 3 | Status: SHIPPED | OUTPATIENT
Start: 2023-06-15 | End: 2023-06-15

## 2023-06-15 NOTE — PROGRESS NOTES
PATIENTINFORMATION    Date of Office Visit: 06/15/2023  Encounter Provider: Fernando Ruffin MD  Place of Service: [facility] CARDIOLOGY  Patient Name: Jefry Sabillon  : 1950    Subjective:     Encounter Date:06/15/2023      Patient ID: Jefry Sabillon is a 72 y.o. male.    No chief complaint on file.    HPI  Mr. Sabillon is here for follow-up visit.  He was accompanied by his wife.  He was noted to be in A-fib during recent clinic visit and medications adjusted.  Today he is back in sinus rhythm.  Looks like he converted back to sinus 4-5 days ago per his home BP machine logs.  Compliant with current medication without any significant side effects.  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  Home blood pressure logs show elevated systolic blood pressure which is comparable to office reading.      ROS  All systems reviewed and negative except as noted in HPI.    Past Medical History:   Diagnosis Date   • Arthritis    • Asthma    • Atrial flutter 2023   • Cardiac disease    • Coronary artery disease    • Diabetes mellitus     take Metformin   • Heart disease    • Hypertension    • IFG (impaired fasting glucose) 2021   • Knee sprain    • Mini stroke    • EVE (obstructive sleep apnea)        Past Surgical History:   Procedure Laterality Date   • CHOLECYSTECTOMY WITH INTRAOPERATIVE CHOLANGIOGRAM N/A 2021    Procedure: CHOLECYSTECTOMY LAPAROSCOPIC INTRAOPERATIVE CHOLANGIOGRAM;  Surgeon: Chikis Wing MD;  Location: [facility] OR;  Service: General;  Laterality: N/A;   • HERNIA REPAIR     • JOINT MANIPULATION Right 2019    Procedure: KNEE MANIPULATION;  Surgeon: Lopez Mejias MD;  Location: [facility] OR;  Service: Orthopedics   • KNEE SURGERY     • TOTAL KNEE ARTHROPLASTY Right 2019    Procedure: TOTAL KNEE ARTHROPLASTY AND ALL ASSOCIATED PROCEDURES;  Surgeon: Lopez Mejias MD;   "Location: [facility] OR;  Service: Orthopedics       Social History     Socioeconomic History   • Marital status:    Tobacco Use   • Smoking status: Never   • Smokeless tobacco: Never   • Tobacco comments:     caffeine use   Vaping Use   • Vaping Use: Never used   Substance and Sexual Activity   • Alcohol use: Yes     Comment: sometimes - not very often   • Drug use: No   • Sexual activity: Yes       Family History   Problem Relation Age of Onset   • Hypertension Father    • Heart disease Father    • Stroke Father          Procedures       Objective:     /90   Pulse 57   Resp 16   Ht 182.9 cm (72\")   Wt 126 kg (278 lb)   SpO2 94%   BMI 37.70 kg/m²  Body mass index is 37.7 kg/m².     Constitutional:       General: Not in acute distress.     Appearance: Well-developed. Not diaphoretic.   Eyes:      Pupils: Pupils are equal, round, and reactive to light.   HENT:      Head: Normocephalic and atraumatic.   Neck:      Thyroid: No thyromegaly.   Pulmonary:      Effort: Pulmonary effort is normal. No respiratory distress.      Breath sounds: Normal breath sounds. No wheezing. No rales.   Chest:      Chest wall: Not tender to palpatation.   Cardiovascular:      Normal rate. Regular rhythm.      No gallop.   Pulses:     Intact distal pulses.   Abdominal:      General: Bowel sounds are normal. There is no distension.      Palpations: Abdomen is soft.      Tenderness: There is no guarding.   Musculoskeletal: Normal range of motion.         General: No deformity.      Cervical back: Normal range of motion and neck supple. Skin:     General: Skin is warm and dry.      Findings: No rash.   Neurological:      Mental Status: Alert and oriented to person, place, and time.      Cranial Nerves: No cranial nerve deficit.      Deep Tendon Reflexes: Reflexes are normal and symmetric.   Psychiatric:         Judgment: Judgment normal.         Review Of Data: I have reviewed pertinent recent labs, images and documents and " pertinent findings included in HPI or assessment below.    Lipid Panel        4/19/2023    10:33   Lipid Panel   Total Cholesterol 165    Triglycerides 197    HDL Cholesterol 40    VLDL Cholesterol 34    LDL Cholesterol  91    LDL/HDL Ratio 2.14          Assessment/Plan:         1. Persistent typical atrial flutter first noted during routine PCP office visit on 4/19/2023 that prompted ER referral and admission for rate control.  Unremarkable echocardiogram.  He had a normal myocardial perfusion study in 2018.    He had successful cardioversion performed on May 18/2023.  He reverted back to a atrial flutter about a week later for which AV birgit blocker dose was adjusted.  Back in sinus rhythm now.  2. Essential hypertension-  3. Mixed hyperlipidemia  on atorvastatin and fenofibrate.  4. Type 2 diabetes mellitus   5. Obesity with EVE on CPAP machine-states compliance with CPAP.  6. History of pericarditis in 2018-no known recurrence  7. History of degenerative joint disease and knee surgery.  8. Coronary artery calcification involving proximal LAD-Normal myocardial perfusion study in 2018.     Recurrent atrial flutter but in sinus rhythm today.  Home sleep study  Myocardial perfusion study  BP on higher side- restart valsartan/hydrochlorothiazide.    Return to clinic in 6 months or sooner with any concerning symptoms.  Diagnosis and plan of care discussed with patient and verbalized understanding.            Your medication list          Accurate as of Ignacia 15, 2023  4:50 PM. If you have any questions, ask your nurse or doctor.            START taking these medications      Instructions Last Dose Given Next Dose Due   metoprolol succinate XL 25 MG 24 hr tablet  Commonly known as: TOPROL-XL  Started by: Fernando Ruffin MD      Take 1 tablet by mouth 2 (Two) Times a Day.       valsartan-hydrochlorothiazide 80-12.5 MG per tablet  Commonly known as: Diovan HCT  Started by: Fernando Ruffin MD      Take 1 tablet  by mouth Daily.          CONTINUE taking these medications      Instructions Last Dose Given Next Dose Due   allopurinol 300 MG tablet  Commonly known as: ZYLOPRIM      1 tablet Every Night.       atorvastatin 20 MG tablet  Commonly known as: LIPITOR      Take 1 tablet by mouth Every Night.       cetirizine 10 MG tablet  Commonly known as: zyrTEC      Take 1 tablet by mouth Daily.       dilTIAZem  MG 24 hr capsule  Commonly known as: CARDIZEM CD           Eliquis 5 MG tablet tablet  Generic drug: apixaban      Take 1 tablet by mouth 2 (Two) Times a Day.       fenofibrate 145 MG tablet  Commonly known as: TRICOR      Take 1 tablet by mouth Daily.       ipratropium-albuterol  MCG/ACT inhaler  Commonly known as: COMBIVENT RESPIMAT      Inhale 1 puff 4 (Four) Times a Day As Needed for Wheezing.       metFORMIN 500 MG tablet  Commonly known as: GLUCOPHAGE      Take 1 tablet by mouth Daily With Breakfast.       pantoprazole 40 MG EC tablet  Commonly known as: PROTONIX      Take 1 tablet by mouth Daily.          STOP taking these medications    metoprolol tartrate 50 MG tablet  Commonly known as: LOPRESSOR  Stopped by: Fernando Ruffin MD              Where to Get Your Medications      These medications were sent to [id number] - [place] [facility] & [address] - [phone]  - [phone] [facility] [address] [facility] [place] [address]    Phone: [phone]   · metoprolol succinate XL 25 MG 24 hr tablet  · valsartan-hydrochlorothiazide 80-12.5 MG per tablet             Fernando Ruffin MD  06/15/23  16:50 EDT

## 2023-10-02 ENCOUNTER — OFFICE VISIT (OUTPATIENT)
Dept: CARDIOLOGY | Facility: CLINIC | Age: 73
End: 2023-10-02
Payer: MEDICARE

## 2023-10-02 VITALS
DIASTOLIC BLOOD PRESSURE: 68 MMHG | BODY MASS INDEX: 38.22 KG/M2 | WEIGHT: 282.2 LBS | SYSTOLIC BLOOD PRESSURE: 130 MMHG | HEIGHT: 72 IN | OXYGEN SATURATION: 92 % | HEART RATE: 74 BPM

## 2023-10-02 DIAGNOSIS — I48.3 TYPICAL ATRIAL FLUTTER: Primary | ICD-10-CM

## 2023-10-02 DIAGNOSIS — E78.2 MIXED HYPERLIPIDEMIA: ICD-10-CM

## 2023-10-02 DIAGNOSIS — I10 ESSENTIAL HYPERTENSION: ICD-10-CM

## 2023-10-02 PROCEDURE — 99214 OFFICE O/P EST MOD 30 MIN: CPT | Performed by: INTERNAL MEDICINE

## 2023-10-02 PROCEDURE — 3078F DIAST BP <80 MM HG: CPT | Performed by: INTERNAL MEDICINE

## 2023-10-02 PROCEDURE — 3075F SYST BP GE 130 - 139MM HG: CPT | Performed by: INTERNAL MEDICINE

## 2023-10-02 RX ORDER — DOXAZOSIN 8 MG/1
8 TABLET ORAL 2 TIMES DAILY
COMMUNITY
Start: 2023-08-15

## 2023-10-02 NOTE — PROGRESS NOTES
PATIENTINFORMATION    Date of Office Visit: 10/02/2023  Encounter Provider: Fernando Ruffin MD  Place of Service: [facility]  Patient Name: Jefry Sabillon  : 1950    Subjective:     Encounter Date:10/02/2023      Patient ID: Jefry Sabillon is a 73 y.o. male.    No chief complaint on file.    HPI  Mr. Sabillon is a pleasant 73 years old gentleman who came to cardiology clinic for follow-up visit.  He recovered from COVID in 2023 and has been feeling tired since then but overall getting better.  He is still active doing different tasks at home. Otherwise he denies any significant shortness of breath, orthopnea, PND, extremity swelling, palpitations, presyncope or syncope. He is compliant with current medical regimen and denies any significant side effects from medications. No ER visit or hospitalization since last clinic visit.      Lately noted fluctuating blood pressure reading since after recovering from COVID.  There were intermittent high blood pressure readings but overall acceptable  ROS  All systems reviewed and negative except as noted in HPI.    Past Medical History:   Diagnosis Date    Arthritis     Asthma     Atrial flutter 2023    Cardiac disease     Coronary artery disease     Diabetes mellitus     take Metformin    Heart disease     Hypertension     IFG (impaired fasting glucose) 2021    Knee sprain     Mini stroke     EVE (obstructive sleep apnea)        Past Surgical History:   Procedure Laterality Date    CHOLECYSTECTOMY WITH INTRAOPERATIVE CHOLANGIOGRAM N/A 2021    Procedure: CHOLECYSTECTOMY LAPAROSCOPIC INTRAOPERATIVE CHOLANGIOGRAM;  Surgeon: Chikis Wing MD;  Location: [facility] OR;  Service: General;  Laterality: N/A;    HERNIA REPAIR  2007    JOINT MANIPULATION Right 2019    Procedure: KNEE MANIPULATION;  Surgeon: Lopez Mejias MD;  Location: [facility] OR;  Service: Orthopedics    KNEE SURGERY  1970    TOTAL KNEE  "ARTHROPLASTY Right 9/4/2019    Procedure: TOTAL KNEE ARTHROPLASTY AND ALL ASSOCIATED PROCEDURES;  Surgeon: Lopez Mejias MD;  Location: [facility];  Service: Orthopedics       Social History     Socioeconomic History    Marital status:    Tobacco Use    Smoking status: Never    Smokeless tobacco: Never    Tobacco comments:     caffeine use   Vaping Use    Vaping Use: Never used   Substance and Sexual Activity    Alcohol use: Yes     Comment: sometimes - not very often    Drug use: No    Sexual activity: Yes       Family History   Problem Relation Age of Onset    Hypertension Father     Heart disease Father     Stroke Father          Procedures       Objective:     /68 (BP Location: Left arm)   Pulse 74   Ht 182.9 cm (72\")   Wt 128 kg (282 lb 3.2 oz)   SpO2 92%   BMI 38.27 kg/m²  Body mass index is 38.27 kg/m².     Constitutional:       General: Not in acute distress.     Appearance: Well-developed. Not diaphoretic.   Eyes:      Pupils: Pupils are equal, round, and reactive to light.   HENT:      Head: Normocephalic and atraumatic.   Neck:      Thyroid: No thyromegaly.   Pulmonary:      Effort: Pulmonary effort is normal. No respiratory distress.      Breath sounds: Normal breath sounds. No wheezing. No rales.   Chest:      Chest wall: Not tender to palpatation.   Cardiovascular:      Normal rate. Regular rhythm.      No gallop.    Pulses:     Intact distal pulses.   Edema:     Peripheral edema absent.   Abdominal:      General: Bowel sounds are normal. There is no distension.      Palpations: Abdomen is soft.      Tenderness: There is no guarding.   Musculoskeletal: Normal range of motion.         General: No deformity.      Cervical back: Normal range of motion and neck supple. Skin:     General: Skin is warm and dry.      Findings: No rash.   Neurological:      Mental Status: Alert and oriented to person, place, and time.      Cranial Nerves: No cranial nerve deficit.      Deep Tendon " Reflexes: Reflexes are normal and symmetric.   Psychiatric:         Judgment: Judgment normal.       Review Of Data: I have reviewed pertinent recent labs, images and documents and pertinent findings included in HPI or assessment below.    Lipid Panel          4/19/2023    10:33   Lipid Panel   Total Cholesterol 165    Triglycerides 197    HDL Cholesterol 40    VLDL Cholesterol 34    LDL Cholesterol  91    LDL/HDL Ratio 2.14          Assessment/Plan:         Persistent typical atrial flutter first noted during routine PCP office visit on 4/19/2023 that prompted ER referral and admission for rate control.  Unremarkable echocardiogram.  He had a normal myocardial perfusion study in 2018 and in 06/2023.  He had successful cardioversion performed on May 18/2023.  He reverted back to a atrial flutter about a week later for which AV birgit blocker dose was adjusted.  Noted to be sinus rhythm since office visit on 6/26/23  Essential hypertension-that is fairly controlled  Mixed hyperlipidemia  on atorvastatin and fenofibrate.  Type 2 diabetes mellitus on metformin-A1c at goal  Obesity with EVE on CPAP machine-states compliance with CPAP.  History of pericarditis in 2018-no known recurrence  History of degenerative joint disease and knee surgery.  Coronary artery calcification involving proximal LAD-Normal myocardial perfusion study in June 2023.       Remains in sinus rhythm  No significant complaints today other than fatigue that is improving since after recovering from COVID.    Return to cardiology clinic in 6 months or sooner with any concerning symptoms.    Diagnosis and plan of care discussed with patient and verbalized understanding.            Your medication list            Accurate as of October 2, 2023 11:48 AM. If you have any questions, ask your nurse or doctor.                CONTINUE taking these medications        Instructions Last Dose Given Next Dose Due   allopurinol 300 MG tablet  Commonly known as:  ZYLOPRIM      1 tablet Every Night.       atorvastatin 20 MG tablet  Commonly known as: LIPITOR      Take 1 tablet by mouth Every Night.       cetirizine 10 MG tablet  Commonly known as: zyrTEC      Take 1 tablet by mouth Daily.       dilTIAZem  MG 24 hr capsule  Commonly known as: CARDIZEM CD           doxazosin 8 MG tablet  Commonly known as: CARDURA      Take 1 tablet by mouth 2 (Two) Times a Day.       Eliquis 5 MG tablet tablet  Generic drug: apixaban      Take 1 tablet by mouth 2 (Two) Times a Day.       fenofibrate 145 MG tablet  Commonly known as: TRICOR      Take 1 tablet by mouth Daily.       ipratropium-albuterol  MCG/ACT inhaler  Commonly known as: COMBIVENT RESPIMAT      Inhale 1 puff 4 (Four) Times a Day As Needed for Wheezing.       metFORMIN 500 MG tablet  Commonly known as: GLUCOPHAGE      Take 1 tablet by mouth Daily With Breakfast.       metoprolol succinate XL 25 MG 24 hr tablet  Commonly known as: TOPROL-XL      Take 1 tablet by mouth 2 (Two) Times a Day.       pantoprazole 40 MG EC tablet  Commonly known as: PROTONIX      Take 1 tablet by mouth Daily.       valsartan-hydrochlorothiazide 80-12.5 MG per tablet  Commonly known as: Diovan HCT      Take 1 tablet by mouth Daily.                    Fernando Ruffin MD  10/02/23  11:48 EDT

## 2023-11-09 ENCOUNTER — OFFICE VISIT (OUTPATIENT)
Dept: SLEEP MEDICINE | Facility: HOSPITAL | Age: 73
End: 2023-11-09
Payer: MEDICARE

## 2023-11-09 VITALS
HEART RATE: 62 BPM | OXYGEN SATURATION: 95 % | BODY MASS INDEX: 38.47 KG/M2 | HEIGHT: 72 IN | WEIGHT: 284 LBS | SYSTOLIC BLOOD PRESSURE: 148 MMHG | DIASTOLIC BLOOD PRESSURE: 74 MMHG

## 2023-11-09 DIAGNOSIS — G47.33 OBSTRUCTIVE SLEEP APNEA, ADULT: Primary | ICD-10-CM

## 2023-11-09 DIAGNOSIS — E66.9 CLASS 2 OBESITY WITH BODY MASS INDEX (BMI) OF 38.0 TO 38.9 IN ADULT, UNSPECIFIED OBESITY TYPE, UNSPECIFIED WHETHER SERIOUS COMORBIDITY PRESENT: ICD-10-CM

## 2023-11-09 DIAGNOSIS — Z86.79 HISTORY OF ATRIAL FLUTTER: ICD-10-CM

## 2023-11-09 PROCEDURE — G0463 HOSPITAL OUTPT CLINIC VISIT: HCPCS

## 2023-11-09 NOTE — PROGRESS NOTES
"  [facility]  [address]  [address] [place] [phone]  Phone   Fax     SLEEP CLINIC FOLLOW UP PROGRESS NOTE.    Jefry Sabillon  [id number]   1950  73 y.o.  male      PCP: Kash Simpson MD      Date of visit: 11/9/2023    Chief Complaint   Patient presents with    Sleep Apnea       Medications and allergies are reviewed by me and documented in the encounter.     SOCIAL (habits pertaining to sleep medicine)  History tobacco use:No   History of alcohol use: 1-2 per week  Caffeine use: 2 beverages per day    HPI:  This is a 73 y.o. PMHx of HTN, Atrial Flutter. Per Prior sleep physician Dr. Murphy encounter on 7/23/2020\"sleep study copy on file from June 2013 showed overall AHI 12 events per hour. This was a SPLIT night study\" (There is no sleep study in the EMR or care-everywhere for my review).  Patient is using positive airway pressure therapy and the symptoms of sleep apnea have improved significantly on the therapy. Normally patient goes to bed at 11 PM and wakes up at 730 AM .  The patient wakes up 1 time(s) during the night and has no problem going back to sleep.  Feels refreshed after waking up.     Overall patient's Impression of their PAP therapy is: states I like it   Feels good about air pressures  No leak symptoms  States he was instructed close interval follow up last visit due to recent A. Flutter diagnosis   Denies hospitalizations/ED Visits since last visit   Denies remarkable health concerns since last visit     Compliance data as reviewed by me with patient in room today    date range 8/10/2023 to 11/7/2023  Overall use 100%  4-hour sonido 86%  Average days used is 7 hours 46 minutes  Device AirSense 10 AutoSet  CPAP mode fixed 11 cm H2O EPR 3  95th percentile leak is 13.9 LPM  Residual AHI is 1.3  DME: [employer]  Mask: Nasal mask      -Last seen in sleep clinic by  Sleep NP Bre Maurer ~7 months ago on 4/28/23 at that time residual Ahi " "stated to be at goal epworth was 4/24 plan was 6 month follow up DME was [facility]. Compliance was good. No reason specified for why 6 month follow up instead of annual.       REVIEW OF SYSTEMS:   Is negative unless otherwise noted in HPI  [place] Sleepiness Scale :Total score: 3     Disclaimer History: The above history is based on this sleep physician's in room encounter with the patient. Pre encounter self administered questionnaires are taken into consideration and discussed with patient for any discordance. The above documentation by this sleep physician is the most accurate clinical information determined by in room sleep physician encounter with patient.     PHYSICAL EXAMINATION:  Vitals:    11/09/23 0900   BP: 148/74   Pulse: 62   SpO2: 95%   Weight: 129 kg (284 lb)   Height: 182.9 cm (72\")    Body mass index is 38.52 kg/m².   CONSTITUTIONAL: Well appearing, in no overt pain or respiratory distress   NOSE: No septal defect  RESP SYSTEM:  No overt respiratory distress, speaks in clear sentences without dyspnea, no accessory muscle use, no dyspnea  CARDIOVASULAR: No edema noted  NEURO: Oriented x 3, no new or worsening gross focal deficits       Compliance data as reviewed by me with patient in room today    date range 8/10/2023 to 11/7/2023  Overall use 100%  4-hour sonido 86%  Average days used is 7 hours 46 minutes  Device AirSense 10 AutoSet  CPAP mode fixed 11 cm H2O EPR 3  95th percentile leak is 13.9 LPM  Residual AHI is 1.3  DME: [facility]  Mask: Nasal mask    ASSESSMENT AND PLAN:  Obstructive sleep apnea ( G 47.33).    -Specific Changes made by me today:  I.  Patient's therapy is at goal, through shared decision making annual follow-up.  He may follow-up with me sooner at any time if he desires.  The symptoms of sleep apnea have improved with the device and the treatment.  Patient's compliance with the device is excellent for treatment of sleep apnea.  I have independently reviewed the smart card down load and " discussed with the patient the download data and encouarged the patient to continue to use the device.The residual AHI is acceptable. The device is benefiting the patient and the device is medically necessary.  Without proper control of sleep apnea and good compliance there is a increased risk for hypertension, diabetes mellitus and nonrestorative sleep with hypersomnia which can increase risk for motor vehicle accidents.  Untreated sleep apnea is also a risk factor for development of atrial fibrillation, pulmonary hypertension, insulin resistance and stroke. The patient is also instructed to get the supplies from the [employer] and and change them on a regular basis.  A prescription for supplies has been sent to the [employer].  I have also discussed the good sleep hygiene habits and adequate amount of sleep needed for good health.  Obesity  with BMI is Body mass index is 38.52 kg/m².. Counseled weight loss will be beneficial for reduction in severity of sleep apnea, healthy diet/exercise to achieve same, follow up with primary care physician for serial monitoring and to further guide management.  History of atrial flutter [Z86.79], Sleep apnea well controlled PAP therapy to benefit same. Follow up with PCP as previous.      Follow up in 1 year . Patient's questions were answered.        EMR Dragon/Transcription disclaimer:   Much of this encounter note is an electronic transcription/translation of spoken language to printed text. The electronic translation of spoken language may permit erroneous, or at times, nonsensical words or phrases to be inadvertently transcribed; Although I have reviewed the note for such errors, some may still exist.       NPI #: [id number]    Zoey Ingram, DO  Sleep Medicine  [facility]  11/09/23

## 2024-01-30 ENCOUNTER — OFFICE VISIT (OUTPATIENT)
Dept: SURGERY | Facility: CLINIC | Age: 74
End: 2024-01-30
Payer: MEDICARE

## 2024-01-30 VITALS
SYSTOLIC BLOOD PRESSURE: 150 MMHG | BODY MASS INDEX: 38.46 KG/M2 | WEIGHT: 283.95 LBS | HEIGHT: 72 IN | DIASTOLIC BLOOD PRESSURE: 75 MMHG

## 2024-01-30 DIAGNOSIS — L02.11 CUTANEOUS ABSCESS OF NECK: Primary | ICD-10-CM

## 2024-01-30 PROCEDURE — 87070 CULTURE OTHR SPECIMN AEROBIC: CPT | Performed by: STUDENT IN AN ORGANIZED HEALTH CARE EDUCATION/TRAINING PROGRAM

## 2024-01-30 PROCEDURE — 87205 SMEAR GRAM STAIN: CPT | Performed by: STUDENT IN AN ORGANIZED HEALTH CARE EDUCATION/TRAINING PROGRAM

## 2024-01-30 RX ORDER — SULFAMETHOXAZOLE AND TRIMETHOPRIM 800; 160 MG/1; MG/1
TABLET ORAL
COMMUNITY
Start: 2024-01-29

## 2024-01-30 NOTE — PROGRESS NOTES
General Surgery History and Physical      Summary:    Jefry Sabillon is a 73 y.o. gentleman with a large right posterior neck abscess status post I&D in the office today.  Culture sent.  Wound care instructions given.  Follow-up as needed.    Referring Provider: No ref. provider found    Chief Complaint:    Sebaceous cyst     History of Present Illness:    Jefry Sabillon is a 73 y.o. gentleman who presented to the office with a few day history of a red and inflamed sebaceous cyst on his posterior right neck. He had one previously in the same location several years ago. He was started on antibiotics by his PCP yesterday.     Past Medical History:   Past Medical History:   Diagnosis Date    Abnormal ECG     Arthritis     Asthma     Atrial flutter 04/20/2023    Cardiac disease     Coronary artery disease     Diabetes mellitus     take Metformin    Heart disease     Hypertension     IFG (impaired fasting glucose) 06/28/2021    Knee sprain     Mini stroke     EVE (obstructive sleep apnea)       Past Surgical History:    Past Surgical History:   Procedure Laterality Date    CHOLECYSTECTOMY WITH INTRAOPERATIVE CHOLANGIOGRAM N/A 6/25/2021    Procedure: CHOLECYSTECTOMY LAPAROSCOPIC INTRAOPERATIVE CHOLANGIOGRAM;  Surgeon: Chikis Wing MD;  Location: [facility] OR;  Service: General;  Laterality: N/A;    HERNIA REPAIR  2007    JOINT MANIPULATION Right 11/11/2019    Procedure: KNEE MANIPULATION;  Surgeon: Lopez Mejias MD;  Location: [facility] OR;  Service: Orthopedics    KNEE SURGERY  1970    TOTAL KNEE ARTHROPLASTY Right 9/4/2019    Procedure: TOTAL KNEE ARTHROPLASTY AND ALL ASSOCIATED PROCEDURES;  Surgeon: Lopez Mejias MD;  Location: [facility] OR;  Service: Orthopedics     Family History:    Family History   Problem Relation Age of Onset    Hypertension Father     Heart disease Father     Stroke Father      Social History:    Social History     Socioeconomic History    Marital status:    Tobacco Use    Smoking  status: Never    Smokeless tobacco: Never    Tobacco comments:     caffeine use   Vaping Use    Vaping Use: Never used   Substance and Sexual Activity    Alcohol use: Yes     Alcohol/week: 2.0 standard drinks of alcohol     Types: 2 Drinks containing 0.5 oz of alcohol per week     Comment: sometimes - not very often    Drug use: Never    Sexual activity: Not Currently     Partners: Female     Allergies:   Allergies   Allergen Reactions    Penicillins Dermatitis and Other (See Comments)     skin sloughing off       Medications:     Current Outpatient Medications:     allopurinol (ZYLOPRIM) 300 MG tablet, 1 tablet Every Night., Disp: , Rfl:     apixaban (ELIQUIS) 5 MG tablet tablet, Take 1 tablet by mouth 2 (Two) Times a Day., Disp: 60 tablet, Rfl: 0    atorvastatin (LIPITOR) 20 MG tablet, Take 1 tablet by mouth Every Night., Disp: 90 tablet, Rfl: 0    cetirizine (zyrTEC) 10 MG tablet, Take 1 tablet by mouth Daily., Disp: , Rfl:     dilTIAZem CD (CARDIZEM CD) 240 MG 24 hr capsule, , Disp: , Rfl:     doxazosin (CARDURA) 8 MG tablet, Take 1 tablet by mouth 2 (Two) Times a Day., Disp: , Rfl:     fenofibrate (TRICOR) 145 MG tablet, Take 1 tablet by mouth Daily., Disp: , Rfl:     ipratropium-albuterol (COMBIVENT RESPIMAT)  MCG/ACT inhaler, Inhale 1 puff 4 (Four) Times a Day As Needed for Wheezing., Disp: , Rfl:     metFORMIN (GLUCOPHAGE) 500 MG tablet, Take 1 tablet by mouth Daily With Breakfast., Disp: , Rfl:     metoprolol succinate XL (TOPROL-XL) 25 MG 24 hr tablet, Take 1 tablet by mouth 2 (Two) Times a Day., Disp: 180 tablet, Rfl: 3    pantoprazole (PROTONIX) 40 MG EC tablet, Take 1 tablet by mouth Daily., Disp: , Rfl:     sulfamethoxazole-trimethoprim (BACTRIM DS,SEPTRA DS) 800-160 MG per tablet, , Disp: , Rfl:     valsartan-hydrochlorothiazide (Diovan HCT) 80-12.5 MG per tablet, Take 1 tablet by mouth Daily., Disp: 90 tablet, Rfl: 3    Radiology/Endoscopy:    No recent pertinent imaging     Labs:    Labs from  5/18/2023 reviewed    Review of Systems:   Influenza-like illness: no fever, no  cough, no  sore throat, no  body aches, no loss of sense of taste or smell, no known exposure to person with Covid-19.  Constitutional: Negative for fevers or chills  HENT: Negative for hearing loss or runny nose  Eyes: Negative for vision changes or scleral icterus  Respiratory: Negative for cough or shortness of breath  Cardiovascular: Negative for chest pain or heart palpitations  Gastrointestinal: Negative for abdominal pain, nausea, vomiting, constipation, melena, or hematochezia  Genitourinary: Negative for hematuria or dysuria  Musculoskeletal: Negative for joint swelling or gait instability  Neurologic: Negative for tremors or seizures  Psychiatric: Negative for suicidal ideations or depression  All other systems reviewed and negative    Physical Exam:   Constitutional: Well-developed well-nourished, no acute distress  Eyes: Conjunctiva normal, sclera nonicteric  ENMT: Hearing grossly normal, oral mucosa moist  Neck: Supple, trachea midline  Respiratory: Clear to auscultation, normal inspiratory effort  Cardiovascular: Regular rate, no peripheral edema, no jugular venous distention  Skin:  Warm, dry, no rash on visualized skin surfaces; large 4cm infected sebaceous cyst of the posterior right neck, inflamed, indurated  Musculoskeletal: Symmetric strength, normal gait  Psychiatric: Alert and oriented ×3, normal affect     Class 2 Severe Obesity (BMI >=35 and <=39.9). Obesity-related health conditions include the following: hypertension. Obesity is unchanged. BMI is is above average; BMI management plan is completed. We discussed portion control and increasing exercise.     Procedure Note:   Area prepped and draped in sterile fashion. 1% lidocaine injected into the skin and subcutaneous tissue. An incision was made over the area of maximal fluctuance. A large amount of purulent material was evacuated. A wound culture was taken.  The area was irrigated. It was packed with quarter inch iodoform gauze. A dry dressing was placed over the wound.    Alondra Starr M.D.  General and Endoscopic Surgery  [facility]    [address], [address]  [place], [place], [address]  P: [phone]  F: [phone]

## 2024-02-02 ENCOUNTER — PATIENT ROUNDING (BHMG ONLY) (OUTPATIENT)
Dept: SURGERY | Facility: CLINIC | Age: 74
End: 2024-02-02
Payer: MEDICARE

## 2024-02-02 LAB
BACTERIA SPEC AEROBE CULT: NORMAL
GRAM STN SPEC: NORMAL
GRAM STN SPEC: NORMAL

## 2024-02-02 NOTE — PROGRESS NOTES
February 2, 2024            I am calling to officially welcome you to our practice and ask about your recent visit. Is this a good time to talk? No. Left voicemail to call back

## 2024-04-11 ENCOUNTER — OFFICE VISIT (OUTPATIENT)
Dept: CARDIOLOGY | Facility: CLINIC | Age: 74
End: 2024-04-11
Payer: MEDICARE

## 2024-04-11 VITALS
WEIGHT: 289 LBS | OXYGEN SATURATION: 97 % | DIASTOLIC BLOOD PRESSURE: 80 MMHG | BODY MASS INDEX: 39.14 KG/M2 | HEART RATE: 67 BPM | HEIGHT: 72 IN | SYSTOLIC BLOOD PRESSURE: 160 MMHG

## 2024-04-11 DIAGNOSIS — I48.3 TYPICAL ATRIAL FLUTTER: Primary | ICD-10-CM

## 2024-04-11 DIAGNOSIS — I10 ESSENTIAL HYPERTENSION: ICD-10-CM

## 2024-04-11 DIAGNOSIS — E78.2 MIXED HYPERLIPIDEMIA: ICD-10-CM

## 2024-04-11 PROCEDURE — 3079F DIAST BP 80-89 MM HG: CPT | Performed by: INTERNAL MEDICINE

## 2024-04-11 PROCEDURE — 3077F SYST BP >= 140 MM HG: CPT | Performed by: INTERNAL MEDICINE

## 2024-04-11 PROCEDURE — 99214 OFFICE O/P EST MOD 30 MIN: CPT | Performed by: INTERNAL MEDICINE

## 2024-04-11 RX ORDER — HYDROCHLOROTHIAZIDE 12.5 MG/1
12.5 CAPSULE, GELATIN COATED ORAL DAILY
Qty: 90 CAPSULE | Refills: 3 | Status: SHIPPED | OUTPATIENT
Start: 2024-04-11

## 2024-04-11 RX ORDER — DILTIAZEM HYDROCHLORIDE EXTENDED-RELEASE TABLETS 240 MG/1
TABLET, EXTENDED RELEASE ORAL
COMMUNITY
Start: 2024-03-22

## 2024-04-11 NOTE — PROGRESS NOTES
PATIENTINFORMATION    Date of Office Visit: 2024  Encounter Provider: Fernando Ruffin MD  Place of Service: [facility] CARDIOLOGY  Patient Name: Jefry Sabillon  : 1950    Subjective:     Encounter Date:2024      Patient ID: Jefry Sabillon is a 73 y.o. male.    No chief complaint on file.    HPI  Mr. Sabillon is a pleasant 74 yo gentleman who came to cardiology clinic for follow-up visit.  He denies any recent exertional chest pain and any new shortness of breath.  He has chronic exertional shortness of breath.  He admits he does not walk or exercise regularly but plans to start very soon.  He is compliant with current medications and denies any recent ER visit or hospitalization.  He has not recently checked his blood pressure at home.    ROS  All systems reviewed and negative except as noted in HPI.    Past Medical History:   Diagnosis Date    Abnormal ECG     Arthritis     Asthma     Atrial flutter 2023    Cardiac disease     Coronary artery disease     Diabetes mellitus     take Metformin    Heart disease     Hypertension     IFG (impaired fasting glucose) 2021    Knee sprain     Mini stroke     EVE (obstructive sleep apnea)        Past Surgical History:   Procedure Laterality Date    CHOLECYSTECTOMY WITH INTRAOPERATIVE CHOLANGIOGRAM N/A 2021    Procedure: CHOLECYSTECTOMY LAPAROSCOPIC INTRAOPERATIVE CHOLANGIOGRAM;  Surgeon: Chikis Wing MD;  Location: [facility] OR;  Service: General;  Laterality: N/A;    HERNIA REPAIR  2007    JOINT MANIPULATION Right 2019    Procedure: KNEE MANIPULATION;  Surgeon: Lopez Mejias MD;  Location: [facility] OR;  Service: Orthopedics    KNEE SURGERY  1970    TOTAL KNEE ARTHROPLASTY Right 2019    Procedure: TOTAL KNEE ARTHROPLASTY AND ALL ASSOCIATED PROCEDURES;  Surgeon: Lopez Mejias MD;  Location: [facility] OR;  Service: Orthopedics       Social History     Socioeconomic History    Marital status:   "  Tobacco Use    Smoking status: Never    Smokeless tobacco: Never    Tobacco comments:     caffeine use   Vaping Use    Vaping status: Never Used   Substance and Sexual Activity    Alcohol use: Yes     Alcohol/week: 2.0 standard drinks of alcohol     Types: 2 Drinks containing 0.5 oz of alcohol per week     Comment: sometimes - not very often    Drug use: Never    Sexual activity: Not Currently     Partners: Female       Family History   Problem Relation Age of Onset    Hypertension Father     Heart disease Father     Stroke Father          Procedures       Objective:     /80 (BP Location: Left arm)   Pulse 67   Ht 182.9 cm (72\")   Wt 131 kg (289 lb)   SpO2 97%   BMI 39.20 kg/m²  Body mass index is 39.2 kg/m².     Constitutional:       General: Not in acute distress.     Appearance: Well-developed. Morbidly obese. Not diaphoretic.   Eyes:      Pupils: Pupils are equal, round, and reactive to light.   HENT:      Head: Normocephalic and atraumatic.   Neck:      Thyroid: No thyromegaly.   Pulmonary:      Effort: Pulmonary effort is normal. No respiratory distress.      Breath sounds: Normal breath sounds. No wheezing. No rales.   Chest:      Chest wall: Not tender to palpatation.   Cardiovascular:      Normal rate.      No gallop.    Pulses:     Intact distal pulses.   Edema:     Peripheral edema absent.   Abdominal:      General: Bowel sounds are normal. There is no distension.      Palpations: Abdomen is soft.      Tenderness: There is no guarding.   Musculoskeletal: Normal range of motion.         General: No deformity.      Cervical back: Normal range of motion and neck supple. Skin:     General: Skin is warm and dry.      Findings: No rash.   Neurological:      Mental Status: Alert and oriented to person, place, and time.      Cranial Nerves: No cranial nerve deficit.      Deep Tendon Reflexes: Reflexes are normal and symmetric.   Psychiatric:         Judgment: Judgment normal.         Review Of Data: " I have reviewed pertinent recent labs, images and documents and pertinent findings included in HPI or assessment below.    Lipid Panel          4/19/2023    10:33   Lipid Panel   Total Cholesterol 165    Triglycerides 197    HDL Cholesterol 40    VLDL Cholesterol 34    LDL Cholesterol  91    LDL/HDL Ratio 2.14          Assessment/Plan:       Persistent typical atrial flutter first noted during routine PCP office visit on 4/19/2023 that prompted ER referral and admission for rate control.  Unremarkable echocardiogram.  He had a normal myocardial perfusion study in 2018 and in 06/2023.  He had successful cardioversion performed on May 18/2023.  He converted  back to a atrial flutter about a week later for which AV birgit blocker dose was adjusted.  Noted to be sinus rhythm since office visit on 6/26/23  Essential hypertension  Mixed hyperlipidemia  on atorvastatin and fenofibrate.  Significantly improved lipid panel.  Type 2 diabetes mellitus on metformin-A1c at goal  Obesity with EVE on CPAP that he uses faithfully.  History of pericarditis in 2018-no known recurrence  History of degenerative joint disease and knee surgery.  Coronary artery calcification involving proximal LAD-Normal myocardial perfusion study in June 2023.     He has gained some weight from lack of activity.  Blood pressure on the higher side.  I have increased hydrochlorothiazide from 12.5 to 25 mg p.o. daily.  He will start checking blood pressure regularly at home and call office with logs in 2 weeks.  Otherwise chest pain-free.  He will start to walk regularly.  Follow-up in cardiology clinic in 1 year or sooner with any concerning symptoms.    Diagnosis and plan of care discussed with patient and verbalized understanding.            Your medication list            Accurate as of April 11, 2024  1:37 PM. If you have any questions, ask your nurse or doctor.                START taking these medications        Instructions Last Dose Given Next Dose  Due   hydroCHLOROthiazide 12.5 MG capsule  Commonly known as: MICROZIDE  Started by: Fernando Ruffin MD      Take 1 capsule by mouth Daily.              CONTINUE taking these medications        Instructions Last Dose Given Next Dose Due   allopurinol 300 MG tablet  Commonly known as: ZYLOPRIM      1 tablet Every Night.       atorvastatin 20 MG tablet  Commonly known as: LIPITOR      Take 1 tablet by mouth Every Night.       cetirizine 10 MG tablet  Commonly known as: zyrTEC      Take 1 tablet by mouth Daily.       dilTIAZem HCl  MG 24 hr tablet  Commonly known as: CARDIZEM LA           doxazosin 8 MG tablet  Commonly known as: CARDURA      Take 1 tablet by mouth 2 (Two) Times a Day.       Eliquis 5 MG tablet tablet  Generic drug: apixaban      Take 1 tablet by mouth 2 (Two) Times a Day.       fenofibrate 145 MG tablet  Commonly known as: TRICOR      Take 1 tablet by mouth Daily.       ipratropium-albuterol  MCG/ACT inhaler  Commonly known as: COMBIVENT RESPIMAT      Inhale 1 puff 4 (Four) Times a Day As Needed for Wheezing.       metFORMIN 500 MG tablet  Commonly known as: GLUCOPHAGE      Take 1 tablet by mouth Daily With Breakfast.       metoprolol succinate XL 25 MG 24 hr tablet  Commonly known as: TOPROL-XL      Take 1 tablet by mouth 2 (Two) Times a Day.       pantoprazole 40 MG EC tablet  Commonly known as: PROTONIX      Take 1 tablet by mouth Daily.       valsartan-hydrochlorothiazide 80-12.5 MG per tablet  Commonly known as: Diovan HCT      Take 1 tablet by mouth Daily.              STOP taking these medications      dilTIAZem  MG 24 hr capsule  Commonly known as: CARDIZEM CD  Stopped by: Fernando Ruffin MD        sulfamethoxazole-trimethoprim 800-160 MG per tablet  Commonly known as: BACTRIM DS,SEPTRA DS  Stopped by: Fernando Ruffin MD                  Where to Get Your Medications        These medications were sent to [facility] - [place], [place] - [address] - [phone]  - [phone] FX  [address] [address]      Phone: [phone]   hydroCHLOROthiazide 12.5 MG capsule             Fernando Ruffin MD  04/11/24  13:37 EDT

## 2024-11-07 ENCOUNTER — OFFICE VISIT (OUTPATIENT)
Dept: CARDIOLOGY | Facility: CLINIC | Age: 74
End: 2024-11-07
Payer: MEDICARE

## 2024-11-07 VITALS
SYSTOLIC BLOOD PRESSURE: 176 MMHG | DIASTOLIC BLOOD PRESSURE: 98 MMHG | HEART RATE: 82 BPM | HEIGHT: 72 IN | WEIGHT: 279.5 LBS | OXYGEN SATURATION: 93 % | BODY MASS INDEX: 37.86 KG/M2

## 2024-11-07 DIAGNOSIS — G47.33 OBSTRUCTIVE SLEEP APNEA: ICD-10-CM

## 2024-11-07 DIAGNOSIS — E78.2 MIXED HYPERLIPIDEMIA: ICD-10-CM

## 2024-11-07 DIAGNOSIS — I10 ESSENTIAL HYPERTENSION: ICD-10-CM

## 2024-11-07 DIAGNOSIS — I48.3 TYPICAL ATRIAL FLUTTER: Primary | ICD-10-CM

## 2024-11-07 DIAGNOSIS — I25.10 CORONARY ARTERY CALCIFICATION SEEN ON CAT SCAN: ICD-10-CM

## 2024-11-07 PROCEDURE — 3077F SYST BP >= 140 MM HG: CPT | Performed by: INTERNAL MEDICINE

## 2024-11-07 PROCEDURE — 99214 OFFICE O/P EST MOD 30 MIN: CPT | Performed by: INTERNAL MEDICINE

## 2024-11-07 PROCEDURE — 3080F DIAST BP >= 90 MM HG: CPT | Performed by: INTERNAL MEDICINE

## 2024-11-07 RX ORDER — VALSARTAN AND HYDROCHLOROTHIAZIDE 160; 25 MG/1; MG/1
1 TABLET ORAL DAILY
Qty: 90 TABLET | Refills: 3 | Status: SHIPPED | OUTPATIENT
Start: 2024-11-07

## 2024-11-07 RX ORDER — PYRIDOXINE HCL (VITAMIN B6) 100 MG
TABLET ORAL
COMMUNITY

## 2024-11-07 RX ORDER — FUROSEMIDE 20 MG/1
20 TABLET ORAL DAILY PRN
COMMUNITY
Start: 2024-10-24

## 2024-11-07 NOTE — PROGRESS NOTES
PATIENTINFORMATION    Date of Office Visit: 2024  Encounter Provider: Fernando Ruffin MD  Place of Service: [facility] CARDIOLOGY  Patient Name: Jefry Sabillon  : 1950    Subjective:     Encounter Date:2024      Patient ID: Jefry Sabillon is a 74 y.o. male.    Chief Complaint   Patient presents with    Follow-up       HPI  Mr. Sabillon is a pleasant 74 years old gentleman who came to cardiac clinic for follow-up visit.  Compliant with all current medications.  Blood pressure runs near normal during home monitoring.  Fairly active without exertional symptoms.  No recent ER visit hospitalization      ROS  All systems reviewed and negative except as noted in HPI.    Past Medical History:   Diagnosis Date    Abnormal ECG     Arthritis     Asthma     Atrial flutter 2023    Cardiac disease     Coronary artery disease     Diabetes mellitus     take Metformin    Heart disease     Hyperlipidemia     Hypertension     IFG (impaired fasting glucose) 2021    Knee sprain     Mini stroke     EVE (obstructive sleep apnea)        Past Surgical History:   Procedure Laterality Date    CHOLECYSTECTOMY WITH INTRAOPERATIVE CHOLANGIOGRAM N/A 2021    Procedure: CHOLECYSTECTOMY LAPAROSCOPIC INTRAOPERATIVE CHOLANGIOGRAM;  Surgeon: Chikis Wing MD;  Location: [facility] OR;  Service: General;  Laterality: N/A;    HERNIA REPAIR  2007    JOINT MANIPULATION Right 2019    Procedure: KNEE MANIPULATION;  Surgeon: Lopez Mejias MD;  Location: [facility] OR;  Service: Orthopedics    KNEE SURGERY  1970    TOTAL KNEE ARTHROPLASTY Right 2019    Procedure: TOTAL KNEE ARTHROPLASTY AND ALL ASSOCIATED PROCEDURES;  Surgeon: Lopez Mejias MD;  Location: [facility] OR;  Service: Orthopedics       Social History     Socioeconomic History    Marital status:    Tobacco Use    Smoking status: Never    Smokeless tobacco: Never    Tobacco comments:     caffeine use   Vaping Use    Vaping  "status: Never Used   Substance and Sexual Activity    Alcohol use: Yes     Alcohol/week: 2.0 standard drinks of alcohol     Types: 2 Drinks containing 0.5 oz of alcohol per week     Comment: sometimes - not very often    Drug use: Never    Sexual activity: Not Currently     Partners: Female       Family History   Problem Relation Age of Onset    Hypertension Father     Heart disease Father     Stroke Father     Heart attack Father          Procedures       Objective:     /98   Pulse 82   Ht 182.9 cm (72\")   Wt 127 kg (279 lb 8 oz)   SpO2 93%   BMI 37.91 kg/m²  Body mass index is 37.91 kg/m².     Constitutional:       General: Not in acute distress.     Appearance: Well-developed. Not diaphoretic.   Eyes:      Pupils: Pupils are equal, round, and reactive to light.   HENT:      Head: Normocephalic and atraumatic.   Neck:      Thyroid: No thyromegaly.   Pulmonary:      Effort: Pulmonary effort is normal. No respiratory distress.      Breath sounds: Normal breath sounds. No wheezing. No rales.   Chest:      Chest wall: Not tender to palpatation.   Cardiovascular:      Normal rate. Regular rhythm.      No gallop.    Pulses:     Intact distal pulses.   Edema:     Peripheral edema absent.   Abdominal:      General: Bowel sounds are normal. There is no distension.      Palpations: Abdomen is soft.      Tenderness: There is no guarding.   Musculoskeletal: Normal range of motion.         General: No deformity.      Cervical back: Normal range of motion and neck supple. Skin:     General: Skin is warm and dry.      Findings: No rash.   Neurological:      Mental Status: Alert and oriented to person, place, and time.      Cranial Nerves: No cranial nerve deficit.      Deep Tendon Reflexes: Reflexes are normal and symmetric.   Psychiatric:         Judgment: Judgment normal.         Review Of Data: I have reviewed pertinent recent labs, images and documents and pertinent findings included in HPI or assessment " below.          Assessment/Plan:     Persistent typical atrial flutter first noted during routine PCP office visit on 4/19/2023 that prompted ER referral and admission for rate control.  Unremarkable echocardiogram.  He had a normal myocardial perfusion study in 2018 and in 06/2023.  He had successful cardioversion performed on May 18/2023.  He converted  back to a atrial flutter about a week later for which AV birgit blocker dose was adjusted.  Noted to be sinus rhythm since office visit on 6/26/23  Essential hypertension  Mixed hyperlipidemia  on atorvastatin and fenofibrate.  Significantly improved lipid panel.  Type 2 diabetes mellitus on metformin-A1c at goal  Obesity with EVE on CPAP that he uses faithfully.  History of pericarditis in 2018-no known recurrence  History of degenerative joint disease and knee surgery.  Coronary artery calcification involving proximal LAD-Normal myocardial perfusion study in June 2023.    Checked BP on both arms-slightly elevated  Remains in sinus rhythm  No significant complaints today.  He is on doxazosin-I will cut back and increase valsartan dose.  I will communicate with his PCP.  Follow-up in cardiac clinic in 1 year or sooner with any concerning symptoms.    Diagnosis and plan of care discussed with patient and verbalized understanding.            Your medication list            Accurate as of November 7, 2024  2:25 PM. If you have any questions, ask your nurse or doctor.                CONTINUE taking these medications        Instructions Last Dose Given Next Dose Due   allopurinol 300 MG tablet  Commonly known as: ZYLOPRIM      1 tablet Every Night.       atorvastatin 20 MG tablet  Commonly known as: LIPITOR      Take 1 tablet by mouth Every Night.       cetirizine 10 MG tablet  Commonly known as: zyrTEC      Take 1 tablet by mouth Daily.       dilTIAZem HCl  MG 24 hr tablet  Commonly known as: CARDIZEM LA           doxazosin 8 MG tablet  Commonly known as:  CARDURA      Take 1 tablet by mouth 2 (Two) Times a Day.       Eliquis 5 MG tablet tablet  Generic drug: apixaban      Take 1 tablet by mouth 2 (Two) Times a Day.       fenofibrate 145 MG tablet  Commonly known as: TRICOR      Take 1 tablet by mouth Daily.       furosemide 20 MG tablet  Commonly known as: LASIX      1 tablet.       hydroCHLOROthiazide 12.5 MG capsule  Commonly known as: MICROZIDE      Take 1 capsule by mouth Daily.       ipratropium-albuterol  MCG/ACT inhaler  Commonly known as: COMBIVENT RESPIMAT      Inhale 1 puff 4 (Four) Times a Day As Needed for Wheezing.       Magnesium 100 MG tablet      Take  by mouth.       metFORMIN 500 MG tablet  Commonly known as: GLUCOPHAGE      Take 1 tablet by mouth Daily With Breakfast.       metoprolol succinate XL 25 MG 24 hr tablet  Commonly known as: TOPROL-XL      Take 1 tablet by mouth 2 (Two) Times a Day.       multivitamin with minerals tablet tablet      Take 1 tablet by mouth Daily.       pantoprazole 40 MG EC tablet  Commonly known as: PROTONIX      Take 1 tablet by mouth Daily.       valsartan-hydrochlorothiazide 80-12.5 MG per tablet  Commonly known as: Diovan HCT      Take 1 tablet by mouth Daily.       Vitamin C ER 1000-100 MG tablet controlled-release      Take  by mouth.       VITAMIN D3 PO      Take  by mouth.                    Fernando Ruffin MD  11/07/24  14:25 EST

## 2024-11-14 ENCOUNTER — PRIOR AUTHORIZATION (OUTPATIENT)
Dept: CARDIOLOGY | Facility: CLINIC | Age: 74
End: 2024-11-14
Payer: MEDICARE

## 2024-11-14 ENCOUNTER — OFFICE VISIT (OUTPATIENT)
Dept: SLEEP MEDICINE | Facility: HOSPITAL | Age: 74
End: 2024-11-14
Payer: MEDICARE

## 2024-11-14 VITALS
WEIGHT: 284 LBS | HEART RATE: 71 BPM | OXYGEN SATURATION: 96 % | DIASTOLIC BLOOD PRESSURE: 78 MMHG | HEIGHT: 72 IN | BODY MASS INDEX: 38.47 KG/M2 | SYSTOLIC BLOOD PRESSURE: 136 MMHG

## 2024-11-14 DIAGNOSIS — E66.812 CLASS 2 SEVERE OBESITY WITH SERIOUS COMORBIDITY AND BODY MASS INDEX (BMI) OF 37.0 TO 37.9 IN ADULT, UNSPECIFIED OBESITY TYPE: ICD-10-CM

## 2024-11-14 DIAGNOSIS — E66.01 CLASS 2 SEVERE OBESITY WITH SERIOUS COMORBIDITY AND BODY MASS INDEX (BMI) OF 37.0 TO 37.9 IN ADULT, UNSPECIFIED OBESITY TYPE: ICD-10-CM

## 2024-11-14 DIAGNOSIS — G47.33 OBSTRUCTIVE SLEEP APNEA, ADULT: Primary | ICD-10-CM

## 2024-11-14 DIAGNOSIS — I10 ESSENTIAL HYPERTENSION: ICD-10-CM

## 2024-11-14 PROCEDURE — G0463 HOSPITAL OUTPT CLINIC VISIT: HCPCS

## 2024-11-14 NOTE — TELEPHONE ENCOUNTER
11/14/24      Patient Prior Authorization has been approved on Covermymeds as of today on behalf of the patient for the Cardura XL 4 mg ER tablets all of the clinical questions has been answered and submitted for the patient insurance company to further review and they sent me an instant approval so the pa is good from 10-15-24 thru 11-14-25 a copy of the approval letter will be sent out to the patient home address on file as well as the provider office no further action required at the present time.        Key: [id number]  PA Case ID #: [id number]        Approved today by [employer] 2017  CaseId:[id number];Status:Approved;Review Type:Prior Auth;Coverage Start Date:10/15/2024;Coverage End Date:11/14/2025;

## 2024-11-14 NOTE — PROGRESS NOTES
[facility]  [address]  [address]  [place] [address]  Phone   Fax     SLEEP CLINIC FOLLOW UP PROGRESS NOTE.    Jefry Sabillon  [id number]   1950  74 y.o.  male      PCP: Kash Simpson MD      Date of visit: 11/14/2024    Chief Complaint   Patient presents with    Sleep Apnea       Medications and allergies are reviewed by me and documented in the encounter.     SOCIAL (habits pertaining to sleep medicine)  History tobacco use:No   History of alcohol use: 1 per week  Caffeine use: 2 beverages/d    HPI:  This is a 74 y.o. PMHx PMHx of HTN. Here for management of obstructive sleep apnea (AHI 12/hr on 6/23/2015 split PSG-as documented by her prior sleep physician clinic note on 7/23/2020 patient was already made aware baseline sleep study not available for my review may need repeat sleep study if DME/insurance.  places barriers to his PAP therapy). Patient is using positive airway pressure therapy and the symptoms of sleep apnea have improved significantly on the therapy. Normally patient goes to bed at 1130 PM and wakes up at 730 AM .  The patient wakes up 1 time(s) during the night and has no problem going back to sleep.  Feels refreshed after waking up.     Overall patient's Impression of their PAP therapy is: Air pressures comfortable   Sleep restorative  [place] normal 3/24  Without PAP apneic events/loud-snoring - elimated with PAP  His current mask sounds like a nasal wisp he really likes this mask wants to continue with this mask     Compliance data as reviewed by me with patient room today:  Date range 8/13/2024 - 11/10/2024  Overall use 100%  4 hour sonido 87%  Average days used 7 hours 13 minutes  Device AirSense 10 AutoSet  Mode CPAP 11 cm H2O with EPR 3 full-time  95th percentile leak 5.2 LPM  AHI 1.8  DME: [employer]  Mask used: Nasal wisp    -BP in sleep clinic 136/78  States he feels well today  Compliant with home therapies  "reviewed    -Obesity class III  Wt Readings from Last 3 Encounters:   11/14/24 129 kg (284 lb)   11/07/24 127 kg (279 lb 8 oz)   04/11/24 131 kg (289 lb)           -Last seen in sleep clinic ~1 year ago by me on 11/9/2023 AHI 1.3, nasal mask, DME [employer]    REVIEW OF SYSTEMS:   Is negative unless otherwise noted in HPI  [place] Sleepiness Scale :Total score: 3     Disclaimer History: The above history is based on this sleep physician's in room encounter with the patient. Pre encounter self administered questionnaires are taken into consideration and discussed with patient for any discordance. The above documentation by this sleep physician is the most accurate clinical information determined by in room sleep physician encounter with patient.     PHYSICAL EXAMINATION:  Vitals:    11/14/24 0900   BP: 136/78   Pulse: 71   SpO2: 96%   Weight: 129 kg (284 lb)   Height: 182.9 cm (72.01\")    Body mass index is 38.51 kg/m².   CONSTITUTIONAL: Well appearing, in no overt pain or respiratory distress   NOSE: No septal defect  RESP SYSTEM:  No overt respiratory distress, speaks in clear sentences without dyspnea, no accessory muscle use  CARDIOVASULAR: No edema noted  NEURO: Oriented x 3, no gross focal deficits     Compliance data as reviewed by me with patient room today:  Date range 8/13/2024 - 11/10/2024  Overall use 100%  4 hour sonido 87%  Average days used 7 hours 13 minutes  Device AirSense 10 AutoSet  Mode CPAP 11 cm H2O with EPR 3 full-time  95th percentile leak 5.2 LPM  AHI 1.8  DME: [employer]  Mask used: Nasal wisp    ASSESSMENT AND PLAN:  Obstructive sleep apnea ( G 47.33).    -Specific Changes made by me today:  I. After review of compliance data, in visit clinical correlation, and through shared decision making: will not make any changes to PAP therapy settings.  II.  Counseled baseline sleep study not available for my review may need repeat sleep study if DME/insurance.  places barriers to his PAP therapy. Counseled he " may need RTO visit to sleep clinic prior to any future sleep study order for appropriate evaluation to determine testing. I see no reason to repeat sleep testing at this time. Patient verbalized understanding and appreciation of this plan.  III. Counseled patient to follow-up with me in 1 year for therapy review.  Counseled may request sooner follow-up to sleep clinic anytime the patient feels necessary.  The symptoms of sleep apnea have improved with the device and the treatment.  Patient's compliance with the device is excellent for treatment of sleep apnea.  I have independently reviewed the smart card down load and discussed with the patient the download data and encouarged the patient to continue to use the device.The residual AHI is acceptable. The device is benefiting the patient and the device is medically necessary.  Without proper control of sleep apnea and good compliance there is a increased risk for hypertension, diabetes mellitus and nonrestorative sleep with hypersomnia which can increase risk for motor vehicle accidents.  Untreated sleep apnea is also a risk factor for development of atrial fibrillation, pulmonary hypertension, insulin resistance and stroke. The patient is also instructed to get the supplies from the [employer] and and change them on a regular basis.  A prescription for supplies has been sent to the [employer].  I have also discussed the good sleep hygiene habits and adequate amount of sleep needed for good health.  Obesity, with BMI is Body mass index is 38.51 kg/m².. Counseled weight loss will be beneficial for reduction in severity of sleep apnea, healthy diet/exercise to achieve same, follow up with primary care physician for serial monitoring and to further guide management.  Essential hypertension [I10], Compliant wit home therapies reviewed.  Counseled patient PAP therapy compliance for treatment of obstructive sleep apnea may be beneficial for this comorbid condition.   Follow-up with PCP as previous for hypertension       Follow up in 1 year . Patient's questions were answered.        EMR Dragon/Transcription disclaimer:   Much of this encounter note is an electronic transcription/translation of spoken language to printed text. The electronic translation of spoken language may permit erroneous, or at times, nonsensical words or phrases to be inadvertently transcribed; Although I have reviewed the note for such errors, some may still exist.       NPI #: [id number]    Zoey Ingram, DO  Sleep Medicine  [facility]  11/14/24

## 2024-12-04 ENCOUNTER — TELEPHONE (OUTPATIENT)
Dept: CARDIOLOGY | Facility: CLINIC | Age: 74
End: 2024-12-04
Payer: MEDICARE

## 2024-12-04 RX ORDER — VALSARTAN AND HYDROCHLOROTHIAZIDE 160; 25 MG/1; MG/1
1 TABLET ORAL DAILY
Qty: 90 TABLET | Refills: 3 | Status: SHIPPED | OUTPATIENT
Start: 2024-12-04

## 2024-12-04 NOTE — TELEPHONE ENCOUNTER
Patient called and stated that the prescriptions Cardura XL and Valsartan were supposed to go to [facility] and they have not received the prescription for these two. I researched this and they were sent to the incorrect pharmacy. I attempted to call the patient and no answer and voicemail was full. I will try to call patient again later.

## 2025-01-21 ENCOUNTER — OFFICE VISIT (OUTPATIENT)
Dept: SURGERY | Facility: CLINIC | Age: 75
End: 2025-01-21
Payer: MEDICARE

## 2025-01-21 VITALS
BODY MASS INDEX: 38.6 KG/M2 | WEIGHT: 285 LBS | OXYGEN SATURATION: 98 % | HEIGHT: 72 IN | HEART RATE: 71 BPM | DIASTOLIC BLOOD PRESSURE: 78 MMHG | SYSTOLIC BLOOD PRESSURE: 140 MMHG

## 2025-01-21 DIAGNOSIS — L72.3 SEBACEOUS CYST: Primary | ICD-10-CM

## 2025-01-21 PROCEDURE — 88304 TISSUE EXAM BY PATHOLOGIST: CPT | Performed by: STUDENT IN AN ORGANIZED HEALTH CARE EDUCATION/TRAINING PROGRAM

## 2025-01-21 NOTE — PROGRESS NOTES
General Surgery History and Physical      Summary:    Jefry Sabillon is a 74 y.o. gentleman with a large right posterior neck sebaceous cyst, s/p repeat excision. Wound care instructions given. Follow up as needed.     Referring Provider: No ref. provider found    Chief Complaint:    Sebaceous cyst     History of Present Illness:    Jefry Sabillon is a 74 y.o. gentleman who presented to the office with a few day history of a red and inflamed sebaceous cyst on his posterior right neck. He had one previously in the same location several years ago. He was started on antibiotics by his PCP yesterday.     1/22/2024 He presents today for follow up. His cyst has recurred and is draining.     Past Medical History:   Past Medical History:   Diagnosis Date    Abnormal ECG     Arthritis     Asthma     Atrial flutter 04/20/2023    Cardiac disease     Coronary artery disease     Diabetes mellitus     take Metformin    Heart disease     Hyperlipidemia     Hypertension     IFG (impaired fasting glucose) 06/28/2021    Knee sprain     Mini stroke     EVE (obstructive sleep apnea)       Past Surgical History:    Past Surgical History:   Procedure Laterality Date    CHOLECYSTECTOMY WITH INTRAOPERATIVE CHOLANGIOGRAM N/A 6/25/2021    Procedure: CHOLECYSTECTOMY LAPAROSCOPIC INTRAOPERATIVE CHOLANGIOGRAM;  Surgeon: Chikis Wing MD;  Location: [facility] OR;  Service: General;  Laterality: N/A;    HERNIA REPAIR  2007    JOINT MANIPULATION Right 11/11/2019    Procedure: KNEE MANIPULATION;  Surgeon: Lopez Mejias MD;  Location: [facility] OR;  Service: Orthopedics    KNEE SURGERY  1970    TOTAL KNEE ARTHROPLASTY Right 9/4/2019    Procedure: TOTAL KNEE ARTHROPLASTY AND ALL ASSOCIATED PROCEDURES;  Surgeon: Lopez Mejias MD;  Location:  [facility] OR;  Service: Orthopedics     Family History:    Family History   Problem Relation Age of Onset    Hypertension Father     Heart disease Father     Stroke Father     Heart attack Father      Social  History:    Social History     Socioeconomic History    Marital status:    Tobacco Use    Smoking status: Never    Smokeless tobacco: Never    Tobacco comments:     caffeine use   Vaping Use    Vaping status: Never Used   Substance and Sexual Activity    Alcohol use: Yes     Alcohol/week: 2.0 standard drinks of alcohol     Types: 2 Drinks containing 0.5 oz of alcohol per week     Comment: sometimes - not very often    Drug use: Never    Sexual activity: Not Currently     Partners: Female     Allergies:   Allergies   Allergen Reactions    Penicillins Dermatitis and Other (See Comments)     skin sloughing off       Medications:     Current Outpatient Medications:     allopurinol (ZYLOPRIM) 300 MG tablet, 1 tablet Every Night., Disp: , Rfl:     apixaban (ELIQUIS) 5 MG tablet tablet, Take 1 tablet by mouth 2 (Two) Times a Day., Disp: 60 tablet, Rfl: 0    atorvastatin (LIPITOR) 20 MG tablet, Take 1 tablet by mouth Every Night., Disp: 90 tablet, Rfl: 0    Bioflavonoid Products (Vitamin C ER) 1000-100 MG tablet controlled-release, Take  by mouth., Disp: , Rfl:     cetirizine (zyrTEC) 10 MG tablet, Take 1 tablet by mouth Daily., Disp: , Rfl:     Cholecalciferol (VITAMIN D3 PO), Take  by mouth., Disp: , Rfl:     dilTIAZem HCl ER (CARDIZEM LA) 240 MG 24 hr tablet, , Disp: , Rfl:     doxazosin XL (CARDURA XL) 4 MG 24 hr tablet, Take 1 tablet by mouth Daily With Breakfast., Disp: 30 tablet, Rfl: 11    fenofibrate (TRICOR) 145 MG tablet, Take 1 tablet by mouth Daily., Disp: , Rfl:     furosemide (LASIX) 20 MG tablet, 1 tablet Daily As Needed (FOR LEG SWELLING)., Disp: , Rfl:     ipratropium-albuterol (COMBIVENT RESPIMAT)  MCG/ACT inhaler, Inhale 1 puff 4 (Four) Times a Day As Needed for Wheezing., Disp: , Rfl:     Magnesium 100 MG tablet, Take  by mouth., Disp: , Rfl:     metFORMIN (GLUCOPHAGE) 500 MG tablet, Take 1 tablet by mouth Daily With Breakfast., Disp: , Rfl:     metoprolol succinate XL (TOPROL-XL) 25 MG 24  hr tablet, Take 1 tablet by mouth 2 (Two) Times a Day., Disp: 180 tablet, Rfl: 3    multivitamin with minerals (MULTIVITAMIN ADULT PO), Take 1 tablet by mouth Daily., Disp: , Rfl:     pantoprazole (PROTONIX) 40 MG EC tablet, Take 1 tablet by mouth Daily., Disp: , Rfl:     valsartan-hydrochlorothiazide (Diovan HCT) 160-25 MG per tablet, Take 1 tablet by mouth Daily., Disp: 90 tablet, Rfl: 3    Radiology/Endoscopy:    No recent pertinent imaging     Labs:    Labs from 5/18/2023 reviewed    Review of Systems:   Influenza-like illness: no fever, no  cough, no  sore throat, no  body aches, no loss of sense of taste or smell, no known exposure to person with Covid-19.  Constitutional: Negative for fevers or chills  HENT: Negative for hearing loss or runny nose  Eyes: Negative for vision changes or scleral icterus  Respiratory: Negative for cough or shortness of breath  Cardiovascular: Negative for chest pain or heart palpitations  Gastrointestinal: Negative for abdominal pain, nausea, vomiting, constipation, melena, or hematochezia  Genitourinary: Negative for hematuria or dysuria  Musculoskeletal: Negative for joint swelling or gait instability  Neurologic: Negative for tremors or seizures  Psychiatric: Negative for suicidal ideations or depression  All other systems reviewed and negative    Physical Exam:   Constitutional: Well-developed well-nourished, no acute distress  Eyes: Conjunctiva normal, sclera nonicteric  ENMT: Hearing grossly normal, oral mucosa moist  Neck: Supple, trachea midline  Respiratory: Clear to auscultation, normal inspiratory effort  Cardiovascular: Regular rate, no peripheral edema, no jugular venous distention  Skin:  Warm, dry, no rash on visualized skin surfaces; soft tissue mass in prior incision   Musculoskeletal: Symmetric strength, normal gait  Psychiatric: Alert and oriented ×3, normal affect     Procedure Note:   Area prepped and draped in sterile fashion. 1% lidocaine was injected into  the skin and subcutaneous tissue. An incision was made around the prior incision with an open draining area. The tissue was transected deep to the soft tissue and amputated at the base. It was passed off for pathology. It was closed with interrupted 3-0 Vicryl suture and skin glue. The patient tolerated the procedure well.     Alondra Starr M.D.  General and Endoscopic Surgery  [facility]    [address], [address]  [place], [place], [address]  P: [phone]  F: [phone]

## 2025-01-24 LAB
CYTO UR: NORMAL
LAB AP CASE REPORT: NORMAL
PATH REPORT.FINAL DX SPEC: NORMAL
PATH REPORT.GROSS SPEC: NORMAL

## 2025-04-10 NOTE — PROGRESS NOTES
"  [facility]  [address]  [address] [place] [address]  Phone   Fax         SLEEP CLINIC FOLLOW-UP PROGRESS NOTE    Jefry Sabillon  [id number]   1950  72 y.o.  male      PCP: Kash Simpson MD    DATE OF VISIT: 4/28/2023        CHIEF COMPLAINT: Obstructive sleep apnea on CPAP    HPI:  This is a 72 y.o. year old patient of Dr. Murphy who presents to the clinic today for the management of obstructive sleep apnea.  Patient had a late night sleep study 6/2013 showing obstructive sleep apnea with AHI of 12/hr.  This patient is using positive airway pressure therapy with CPAP at 11 cm H2O, and the symptoms of sleep apnea have improved with this therapy.  Patient usually goes to bed around 12 AM and wakes up around 1030 AM .  This patient wakes up 1 time(s) during the night to use the restroom and does not have issues going back to sleep.  Patient feels refreshed upon awakening for the day.  He works 2 nights a week and uses CPAP less these days.  We discussed trying to use at least 4 to 6 hours per 24 hours if possible, all night every night preferred.  He denies any morning headaches or other issues.  Sleep apnea symptoms well treated.  Residual AHI at goal.      MEDICATIONS: reviewed     ALLERGIES:  Penicillins    SOCIAL HISTORY (habits pertaining to sleep medicine):  • History tobacco use: No   • History of alcohol use: 1 per week  • Caffeine use: 2 cups coffee    REVIEW OF SYSTEMS:   Pertinent positive symptoms are:  • [place] Sleepiness Scale :Total score: 4   • Dyspnea--following with PCP and cardiology      PHYSICAL EXAMINATION:  CONSTITUTIONAL:  Vitals:    04/28/23 0900   BP: 150/94   Pulse: 80   SpO2: 96%   Weight: 130 kg (286 lb)   Height: 182.9 cm (72\")    Body mass index is 38.79 kg/m².   HEAD: atraumatic, normocephalic  RESP SYSTEM: not in respiratory distress, breathing unlabored  CARDIOVASULAR: normal rate, no edema noted   NEURO: Alert and " stated oriented x 3, mood and affect appeared appropriate      DATA REVIEWED:  The Smart card downloaded on 4/28/2023 has been reviewed independently by me for compliance and discussed the data with the patient.   Compliance: 100%  More than 4 hr use: 81%  Average use of the device: 7 hours per night  Residual AHI: 1.8 /hr (goal < 5.0 /hr)  Mask type: Nasal  Device: ResMed AirSense 10 auto CPAP  DME: [facility]      ASSESSMENT AND PLAN:  · Obstructive Sleep Apnea (G 47.33): sleep apnea symptoms have improved with the device and treatment.  Patient has excellent compliance with the device for treatment of sleep apnea.  I have personally reviewed the smart card download and discussed the download data with the patient and encouarged continued use of the device.  The residual AHI is acceptable. The device is benefiting the patient and the device is medically necessary.  Without adequate treatment of sleep apnea and without good compliance, patient would be at increased risk of hypertension, diabetes mellitus, pulmonary hypertension, atrial fibrillation, stroke, and nonrestorative sleep with hypersomnia which could increase risk of motor vehicle accidents. The patient is also instructed to get the supplies from the [employer] [facility] and and change them on a regular basis.  A prescription for supplies has been sent to the [facility]  I have also discussed with this patient the importance of good sleep hygiene and getting an adequate amount of sleep to aid in overall good health.   · Obesity: Body mass index is 38.79 kg/m².. Patients who are overweight or obese are at increased risk of sleep apnea/ sleep disordered breathing. Weight reduction and healthy lifestyle are encouraged in overweight/ obese patients as part of a comprehensive approach to sleep apnea treatment.    · Atrial flutter: Following with cardiology.  His sleep apnea is well treated.  Recommended continuing to use CPAP all night every night.  Keep cardiology follow-up as  scheduled.      Patient will follow-up in 6 months with Dr. Murphy or follow-up sooner for any issues or concerns.  Patient's questions were answered.          Thank you for allowing me to participate in the care of this patient.     Bre Maurer DNP, APRN  [facility]

## 2025-06-09 ENCOUNTER — TELEPHONE (OUTPATIENT)
Dept: SURGERY | Facility: CLINIC | Age: 75
End: 2025-06-09
Payer: MEDICARE

## 2025-06-09 ENCOUNTER — TRANSCRIBE ORDERS (OUTPATIENT)
Dept: NUCLEAR MEDICINE | Facility: HOSPITAL | Age: 75
End: 2025-06-09
Payer: MEDICARE

## 2025-06-09 DIAGNOSIS — I48.92 PAROXYSMAL ATRIAL FLUTTER: Primary | ICD-10-CM

## 2025-06-09 NOTE — TELEPHONE ENCOUNTER
Joy'd cscope referral from Dr. Simpson to Dr. Mcwilliams. Mailed packet to patient. Printed previous colonoscopy report from 2015.

## 2025-06-16 ENCOUNTER — OFFICE VISIT (OUTPATIENT)
Dept: ORTHOPEDIC SURGERY | Facility: CLINIC | Age: 75
End: 2025-06-16
Payer: MEDICARE

## 2025-06-16 VITALS
WEIGHT: 286 LBS | SYSTOLIC BLOOD PRESSURE: 142 MMHG | DIASTOLIC BLOOD PRESSURE: 82 MMHG | HEIGHT: 72 IN | BODY MASS INDEX: 38.74 KG/M2 | HEART RATE: 65 BPM

## 2025-06-16 DIAGNOSIS — M17.12 PRIMARY OSTEOARTHRITIS OF LEFT KNEE: Primary | ICD-10-CM

## 2025-06-16 DIAGNOSIS — G89.29 CHRONIC PAIN OF LEFT KNEE: ICD-10-CM

## 2025-06-16 DIAGNOSIS — M25.562 CHRONIC PAIN OF LEFT KNEE: ICD-10-CM

## 2025-06-16 RX ADMIN — TRIAMCINOLONE ACETONIDE 80 MG: 40 INJECTION, SUSPENSION INTRA-ARTICULAR; INTRAMUSCULAR at 09:38

## 2025-06-16 RX ADMIN — LIDOCAINE HYDROCHLORIDE 8 ML: 10 INJECTION, SOLUTION EPIDURAL; INFILTRATION; INTRACAUDAL; PERINEURAL at 09:38

## 2025-06-16 NOTE — PROGRESS NOTES
Subjective:     Patient ID: Jefry Sabillon is a 74 y.o. male.    Chief Complaint:  Left knee pain, new patient to examiner, seen greater than 3 years  History of Present Illness  History of Present Illness  The patient is a 74-year-old male who presents to the clinic today for evaluation of his left knee. He is accompanied by his spouse.    He reports experiencing pain in the anterior aspect of his left knee, which he rates as 4 to 5 out of 10 in severity. The pain is described as stabbing in nature and is accompanied by stiffness and swelling. He began noticing compensation in his left knee, particularly when using stairs. He tries to alternate using the handrail when available.    He has tried steroid injections in 2018 for his knee. He reports no recent injuries to the knee or symptoms such as locking, catching, or instability.    SOCIAL HISTORY  Marital Status: Present with spouse         Social History     Occupational History    Not on file   Tobacco Use    Smoking status: Never     Passive exposure: Never    Smokeless tobacco: Never    Tobacco comments:     caffeine use   Vaping Use    Vaping status: Never Used   Substance and Sexual Activity    Alcohol use: Yes     Alcohol/week: 2.0 standard drinks of alcohol     Types: 2 Drinks containing 0.5 oz of alcohol per week     Comment: sometimes - not very often    Drug use: Never    Sexual activity: Not Currently     Partners: Female      Past Medical History:   Diagnosis Date    Abnormal ECG     Arthritis     Asthma     Atrial flutter 04/20/2023    Cardiac disease     Coronary artery disease     Diabetes mellitus     take Metformin    Heart disease     Hyperlipidemia     Hypertension     IFG (impaired fasting glucose) 06/28/2021    Knee sprain     Mini stroke     EVE (obstructive sleep apnea)      Past Surgical History:   Procedure Laterality Date    CHOLECYSTECTOMY WITH INTRAOPERATIVE CHOLANGIOGRAM N/A 6/25/2021    Procedure: CHOLECYSTECTOMY LAPAROSCOPIC  "INTRAOPERATIVE CHOLANGIOGRAM;  Surgeon: Chikis Wing MD;  Location: [facility] OR;  Service: General;  Laterality: N/A;    HERNIA REPAIR  2007    JOINT MANIPULATION Right 11/11/2019    Procedure: KNEE MANIPULATION;  Surgeon: Lopez Mejias MD;  Location: [facility] OR;  Service: Orthopedics    KNEE SURGERY  1970    TOTAL KNEE ARTHROPLASTY Right 9/4/2019    Procedure: TOTAL KNEE ARTHROPLASTY AND ALL ASSOCIATED PROCEDURES;  Surgeon: Lopez Mejias MD;  Location: [facility] OR;  Service: Orthopedics       Family History   Problem Relation Age of Onset    Hypertension Father     Heart disease Father     Stroke Father     Heart attack Father                Objective:  Physical Exam    Vital signs reviewed.   General: No acute distress.  Eyes: conjunctiva clear; pupils equally round and reactive  ENT: external ears and nose atraumatic; oropharynx clear  CV: no peripheral edema  Resp: normal respiratory effort  Skin: no rashes or wounds; normal turgor  Psych: mood and affect appropriate; recent and remote memory intact    Vitals:    06/16/25 0854   BP: 142/82   Pulse: 65   Weight: 130 kg (286 lb)   Height: 182.9 cm (72\")         06/16/25  0854   Weight: 130 kg (286 lb)     Body mass index is 38.79 kg/m².      Ortho Exam     Physical Exam  Musculoskeletal:  Left knee: Range of motion from 0 degrees extension to 120 degrees flexion, stable to varus and valgus stress at 0 degrees and 30 degrees, 1+ anterior Lachman exam, palpable tenderness along the medial compartment and patellofemoral, positive active patellar compression test, positive crepitus throughout, severe swelling, negative effusion, quad strength 5 out of 5    Imaging:  Left Knee X-Ray  Indication: Pain    AP, Lateral, and [place] views    Findings:  No fracture  No bony lesion  Normal soft tissues  Advanced tricompartmental degeneration with bone-on-bone articulation along the medial compartment reactive osteophytes in all 3 compartments overall varus " alignment    prior studies were available for comparison.    Assessment:        1. Chronic pain of left knee    2. Primary osteoarthritis of left knee         Assessment & Plan  1. Left knee pain:    Discussed the plan of care with Jefry and his spouse. He does not wish to proceed with total knee arthroplasty at this time. Options including corticosteroid injection were discussed to see if this offers significant symptom improvement. The potential for increased glucose levels for approximately 3 to 4 days post-injection was discussed, with the expectation that glucose levels should normalize thereafter. It was also explained that this is a long-acting steroid, which should prevent a spike in glucose levels. He expressed understanding and agreement with the plan of care. If significant improvement is noted, viscosupplementation injections will be considered, pending insurance coverage. He will follow up to inform us of his progress and his decision regarding the visco injections. He is encouraged to contact us with any questions or concerns.    Follow-up: Jefry will follow up to inform us of his progress and his decision regarding the visco injections.    Plan:  - Large Joint Arthrocentesis: L knee on 6/16/2025 9:38 AM  Indications: pain  Details: 22 G needle, anterolateral approach  Medications: 8 mL lidocaine PF 1% 1 %; 80 mg triamcinolone acetonide 40 MG/ML  Outcome: tolerated well, no immediate complications  Procedure, treatment alternatives, risks and benefits explained, specific risks discussed. Consent was given by the patient. Immediately prior to procedure a time out was called to verify the correct patient, procedure, equipment, support staff and site/side marked as required. Patient was prepped and draped in the usual sterile fashion.           Orders:  Orders Placed This Encounter   Procedures    - Large Joint Arthrocentesis: L knee    XR Knee 3+ View With Quinn Left     No orders of the defined  types were placed in this encounter.          Dragon dictation utilized  Class 2 Severe Obesity (BMI >=35 and <=39.9). Obesity-related health conditions include the following: osteoarthritis. Obesity is newly identified. BMI is is above average; BMI management plan is completed. We discussed portion control, increasing exercise, and Information on healthy weight added to patient's after visit summary.       Patient or patient representative verbalized consent for the use of Ambient Listening during the visit with  RASHAD Campo for chart documentation. 6/17/2025  07:59 EDT

## 2025-06-17 PROBLEM — M17.12 PRIMARY OSTEOARTHRITIS OF LEFT KNEE: Status: ACTIVE | Noted: 2025-06-17

## 2025-06-17 RX ORDER — LIDOCAINE HYDROCHLORIDE 10 MG/ML
8 INJECTION, SOLUTION EPIDURAL; INFILTRATION; INTRACAUDAL; PERINEURAL
Status: COMPLETED | OUTPATIENT
Start: 2025-06-16 | End: 2025-06-16

## 2025-06-17 RX ORDER — TRIAMCINOLONE ACETONIDE 40 MG/ML
80 INJECTION, SUSPENSION INTRA-ARTICULAR; INTRAMUSCULAR
Status: COMPLETED | OUTPATIENT
Start: 2025-06-16 | End: 2025-06-16

## 2025-06-18 ENCOUNTER — HOSPITAL ENCOUNTER (OUTPATIENT)
Dept: NUCLEAR MEDICINE | Facility: HOSPITAL | Age: 75
Discharge: HOME | End: 2025-06-18
Payer: MEDICARE

## 2025-06-18 ENCOUNTER — HOSPITAL ENCOUNTER (OUTPATIENT)
Dept: CARDIOLOGY | Facility: HOSPITAL | Age: 75
Discharge: HOME | End: 2025-06-18
Payer: MEDICARE

## 2025-06-18 DIAGNOSIS — I48.92 PAROXYSMAL ATRIAL FLUTTER: ICD-10-CM

## 2025-06-18 LAB
BH CV REST NUCLEAR ISOTOPE DOSE: 11.4 MCI
BH CV STRESS BP STAGE 1: NORMAL
BH CV STRESS COMMENTS STAGE 1: NORMAL
BH CV STRESS DOSE REGADENOSON STAGE 1: 0.4
BH CV STRESS DURATION MIN STAGE 1: 0
BH CV STRESS DURATION SEC STAGE 1: 30
BH CV STRESS HR STAGE 1: 66
BH CV STRESS NUCLEAR ISOTOPE DOSE: 35.1 MCI
BH CV STRESS O2 STAGE 1: 95
BH CV STRESS PROTOCOL 1: NORMAL
BH CV STRESS RECOVERY BP: NORMAL MMHG
BH CV STRESS RECOVERY HR: 75 BPM
BH CV STRESS RECOVERY O2: 97 %
BH CV STRESS STAGE 1: 1
MAXIMAL PREDICTED HEART RATE: 148 BPM
PERCENT MAX PREDICTED HR: 62.16 %
SPECT HRT GATED+EF W RNC IV: 65 %
STRESS BASELINE BP: NORMAL MMHG
STRESS BASELINE HR: 63 BPM
STRESS O2 SAT REST: 95 %
STRESS PERCENT HR: 73 %
STRESS POST ESTIMATED WORKLOAD: 1 METS
STRESS POST EXERCISE DUR SEC: 30 SEC
STRESS POST O2 SAT PEAK: 98 %
STRESS POST PEAK BP: NORMAL MMHG
STRESS POST PEAK HR: 92 BPM
STRESS TARGET HR: 126 BPM

## 2025-06-18 PROCEDURE — A9500 TC99M SESTAMIBI: HCPCS | Performed by: FAMILY MEDICINE

## 2025-06-18 PROCEDURE — 78452 HT MUSCLE IMAGE SPECT MULT: CPT

## 2025-06-18 PROCEDURE — 34310000005 TECHNETIUM SESTAMIBI: Performed by: FAMILY MEDICINE

## 2025-06-18 PROCEDURE — 93017 CV STRESS TEST TRACING ONLY: CPT

## 2025-06-18 PROCEDURE — 25010000002 REGADENOSON 0.4 MG/5ML SOLUTION: Performed by: FAMILY MEDICINE

## 2025-06-18 RX ORDER — REGADENOSON 0.08 MG/ML
0.4 INJECTION, SOLUTION INTRAVENOUS
Status: COMPLETED | OUTPATIENT
Start: 2025-06-18 | End: 2025-06-18

## 2025-06-18 RX ADMIN — TECHNETIUM TC 99M SESTAMIBI 1 DOSE: 1 INJECTION INTRAVENOUS at 07:40

## 2025-06-18 RX ADMIN — TECHNETIUM TC 99M SESTAMIBI 1 DOSE: 1 INJECTION INTRAVENOUS at 09:21

## 2025-06-18 RX ADMIN — REGADENOSON 0.4 MG: 0.08 INJECTION, SOLUTION INTRAVENOUS at 09:21

## 2025-06-19 ENCOUNTER — PATIENT ROUNDING (BHMG ONLY) (OUTPATIENT)
Dept: ORTHOPEDIC SURGERY | Facility: CLINIC | Age: 75
End: 2025-06-19
Payer: MEDICARE

## 2025-06-19 NOTE — PROGRESS NOTES
A My-Chart message has been sent to the patient for PATIENT ROUNDING with [facility].

## 2025-06-23 ENCOUNTER — TRANSCRIBE ORDERS (OUTPATIENT)
Dept: ADMINISTRATIVE | Facility: HOSPITAL | Age: 75
End: 2025-06-23
Payer: MEDICARE

## 2025-06-23 DIAGNOSIS — R06.09 DYSPNEA ON EFFORT: Primary | ICD-10-CM

## 2025-07-07 ENCOUNTER — HOSPITAL ENCOUNTER (OUTPATIENT)
Dept: PULMONOLOGY | Facility: HOSPITAL | Age: 75
Discharge: HOME | End: 2025-07-07
Admitting: FAMILY MEDICINE
Payer: MEDICARE

## 2025-07-07 DIAGNOSIS — R06.09 DYSPNEA ON EFFORT: ICD-10-CM

## 2025-07-07 LAB
BDY SITE: NORMAL
HGB BLDA-MCNC: 14.1 G/DL (ref 14–18)
Lab: NORMAL

## 2025-07-07 PROCEDURE — 94726 PLETHYSMOGRAPHY LUNG VOLUMES: CPT

## 2025-07-07 PROCEDURE — 82820 HEMOGLOBIN-OXYGEN AFFINITY: CPT

## 2025-07-07 PROCEDURE — 94060 EVALUATION OF WHEEZING: CPT

## 2025-07-07 PROCEDURE — 94729 DIFFUSING CAPACITY: CPT

## 2025-07-07 RX ORDER — ALBUTEROL SULFATE 0.83 MG/ML
2.5 SOLUTION RESPIRATORY (INHALATION) ONCE
Status: COMPLETED | OUTPATIENT
Start: 2025-07-07 | End: 2025-07-07

## 2025-07-07 RX ADMIN — ALBUTEROL SULFATE 2.5 MG: 2.5 SOLUTION RESPIRATORY (INHALATION) at 14:00

## (undated) DEVICE — ENDOPATH XCEL UNIVERSAL TROCAR STABLILITY SLEEVES: Brand: ENDOPATH XCEL

## (undated) DEVICE — PREP SOL POVIDONE/IODINE BT 4OZ

## (undated) DEVICE — ENDOPATH XCEL BLADELESS TROCARS WITH STABILITY SLEEVES: Brand: ENDOPATH XCEL

## (undated) DEVICE — HOOD: Brand: FLYTE

## (undated) DEVICE — SYS SKIN CLS DERMABOND PRINEO W/22CM MESH TP

## (undated) DEVICE — DRP C/ARM 41X74IN

## (undated) DEVICE — TUBING, SUCTION, 1/4" X 12', STRAIGHT: Brand: MEDLINE

## (undated) DEVICE — BNDG ELAS ELITE V/CLOSE 6IN 5YD LF STRL

## (undated) DEVICE — TOWEL,OR,DSP,ST,BLUE,STD,4/PK,20PK/CS: Brand: MEDLINE

## (undated) DEVICE — SUT VIC 2/0 CT1 CR8 18IN J839D

## (undated) DEVICE — MEDI-VAC YANK SUCT HNDL W/TPRD BULBOUS TIP: Brand: CARDINAL HEALTH

## (undated) DEVICE — PK KN TOTL 90

## (undated) DEVICE — CEMENT MIXING SYSTEM WITH FEMORAL BREAKWAY NOZZLE: Brand: REVOLUTION

## (undated) DEVICE — NDL HYPO SFTY GLD 22G 1 1/2IN

## (undated) DEVICE — SPNG GZ WOVN 4X4IN 12PLY 10/BX STRL

## (undated) DEVICE — FRAZIER SURGICAL SUCTION INSTRUMENT: Brand: ARGYLE

## (undated) DEVICE — APPL CHLORAPREP HI/LITE 26ML ORNG

## (undated) DEVICE — KT CATH NERV BLCK ONQ QUIKBLCK 20GA 100MM

## (undated) DEVICE — BOWL PLSTC LG 32OZ BLU STRL

## (undated) DEVICE — PK LAP GEN 90

## (undated) DEVICE — SCRW HEX PERSONA FML 2.5X25MM PK/2
Type: IMPLANTABLE DEVICE | Site: KNEE | Status: NON-FUNCTIONAL
Removed: 2019-09-04

## (undated) DEVICE — ADHS SKIN PREMIERPRO EXOFIN TOPICAL HI/VISC .5ML

## (undated) DEVICE — SOL ISO/ALC RUB 70PCT 4OZ

## (undated) DEVICE — DUAL CUT SAGITTAL BLADE

## (undated) DEVICE — SPNG GZ 2S 2X2 8PLY STRL PK/2

## (undated) DEVICE — ENDOCUT SCISSOR TIP, DISPOSABLE: Brand: RENEW

## (undated) DEVICE — Device

## (undated) DEVICE — GLV SURG SENSICARE ORTHO PF LF 7 STRL

## (undated) DEVICE — APPL CHLORAPREP W/TINT 26ML ORNG

## (undated) DEVICE — UNDYED BRAIDED (POLYGLACTIN 910), SYNTHETIC ABSORBABLE SUTURE: Brand: COATED VICRYL

## (undated) DEVICE — DECANT BG O JET

## (undated) DEVICE — GLV SURG SENSICARE W/ALOE PF LF 7.5 STRL

## (undated) DEVICE — TBG INSUFL W FLTR STRL

## (undated) DEVICE — GLV SURG NEOLON 2G PF LF 7.5 STRL

## (undated) DEVICE — SUT VIC 0 CT1 36IN J946H

## (undated) DEVICE — APPL HEMOS FOR DELIVERY FLOSEAL

## (undated) DEVICE — SCRW HEX CORT HD 3.5X38MM
Type: IMPLANTABLE DEVICE | Site: KNEE | Status: NON-FUNCTIONAL
Removed: 2019-09-04

## (undated) DEVICE — COLD THERAPY BLANKET: Brand: DEROYAL

## (undated) DEVICE — CONTAINER,SPECIMEN,OR STERILE,4OZ: Brand: MEDLINE

## (undated) DEVICE — SYR LUERLOK 20CC BX/50

## (undated) DEVICE — CATH IV INSYTE AUTOGARD 14G 1 1/2IN ORNG

## (undated) DEVICE — BNDG ADHS PLSTC 1X3IN LF

## (undated) DEVICE — ST EXT IV TBG W SECUR LK 20IN

## (undated) DEVICE — 2, DISPOSABLE SUCTION/IRRIGATOR WITH DISPOSABLE TIP: Brand: STRYKEFLOW

## (undated) DEVICE — SYR LL TP 10ML STRL

## (undated) DEVICE — PIN DRL NOHEAD TROC 3.2X75MM BX/4

## (undated) DEVICE — 3M™ STERI-DRAPE™ U-DRAPE 1015: Brand: STERI-DRAPE™

## (undated) DEVICE — MAT FLR ABSORBENT LG 4FT 10 2.5FT

## (undated) DEVICE — DRSNG TELFA PAD NONADH STR 1S 3X8IN

## (undated) DEVICE — ENDOPATH PNEUMONEEDLE INSUFFLATION NEEDLES WITH LUER LOCK CONNECTORS 120MM: Brand: ENDOPATH

## (undated) DEVICE — BNDG ELAS ELITE V/CLOSE 4IN 5YD LF STRL

## (undated) DEVICE — DRAPE,REIN 53X77,STERILE: Brand: MEDLINE

## (undated) DEVICE — SYR LUERLOK 30CC

## (undated) DEVICE — PAD,ABDOMINAL,5"X9",STERILE,LF,1/PK: Brand: MEDLINE INDUSTRIES, INC.

## (undated) DEVICE — ENDOPOUCH RETRIEVER SPECIMEN RETRIEVAL BAGS: Brand: ENDOPOUCH RETRIEVER

## (undated) DEVICE — IRRIGATOR BULB 60CC

## (undated) DEVICE — GLV SURG SENSICARE POLYISPRN W/ALOE PF LF 6.5 GRN STRL

## (undated) DEVICE — 10511 ROLL STOP SKIN MARKER; RULED CAP FINE TIP; W/ RULER: Brand: 10511 ROLL STOP SKIN MARKER; RULED CAP FINE TIP; W/ RULER

## (undated) DEVICE — LAPAROSCOPIC SMOKE FILTRATION SYSTEM: Brand: PALL LAPAROSHIELD® PLUS LAPAROSCOPIC SMOKE FILTRATION SYSTEM

## (undated) DEVICE — CATH CHOLANG 4.5F18IN BRGNDY

## (undated) DEVICE — DECANTER: Brand: UNBRANDED

## (undated) DEVICE — GOWN ,SIRUS,NONREINFORCED SMALL: Brand: MEDLINE

## (undated) DEVICE — WEBRIL* CAST PADDING: Brand: DEROYAL

## (undated) DEVICE — NDL HYPO PRECISIONGLIDE/REG 18G 1IN PNK

## (undated) DEVICE — DISPOSABLE TOURNIQUET CUFF SINGLE BLADDER, SINGLE PORT AND LUER LOCK CONNECTOR: Brand: COLOR CUFF

## (undated) DEVICE — IMMOB KN 3PNL DLX CANVS 22IN BLU

## (undated) DEVICE — SUT VIC 0 TN 27IN DYED JTN0G

## (undated) DEVICE — PATIENT RETURN ELECTRODE, SINGLE-USE, CONTACT QUALITY MONITORING, ADULT, WITH 9FT CORD, FOR PATIENTS WEIGING OVER 33LBS. (15KG): Brand: MEGADYNE

## (undated) DEVICE — INTENDED FOR TISSUE SEPARATION, AND OTHER PROCEDURES THAT REQUIRE A SHARP SURGICAL BLADE TO PUNCTURE OR CUT.: Brand: BARD-PARKER ® STAINLESS STEEL BLADES

## (undated) DEVICE — Device: Brand: MEDEX